# Patient Record
Sex: MALE | Race: BLACK OR AFRICAN AMERICAN | Employment: OTHER | URBAN - METROPOLITAN AREA
[De-identification: names, ages, dates, MRNs, and addresses within clinical notes are randomized per-mention and may not be internally consistent; named-entity substitution may affect disease eponyms.]

---

## 2021-01-01 ENCOUNTER — APPOINTMENT (OUTPATIENT)
Dept: NON INVASIVE DIAGNOSTICS | Age: 66
DRG: 194 | End: 2021-01-01
Attending: SPECIALIST
Payer: MEDICAID

## 2021-01-01 ENCOUNTER — APPOINTMENT (OUTPATIENT)
Dept: NUCLEAR MEDICINE | Age: 66
DRG: 194 | End: 2021-01-01
Attending: SPECIALIST
Payer: MEDICAID

## 2021-01-01 ENCOUNTER — HOSPITAL ENCOUNTER (OUTPATIENT)
Age: 66
Setting detail: OUTPATIENT SURGERY
Discharge: HOME | End: 2021-11-29
Attending: INTERNAL MEDICINE | Admitting: INTERNAL MEDICINE
Payer: MEDICAID

## 2021-01-01 ENCOUNTER — HOSPITAL ENCOUNTER (OUTPATIENT)
Dept: NUCLEAR MEDICINE | Age: 66
Discharge: HOME | End: 2021-10-13
Attending: NURSE PRACTITIONER
Payer: COMMERCIAL

## 2021-01-01 ENCOUNTER — APPOINTMENT (OUTPATIENT)
Dept: GENERAL RADIOLOGY | Age: 66
DRG: 194 | End: 2021-01-01
Attending: EMERGENCY MEDICINE
Payer: MEDICAID

## 2021-01-01 ENCOUNTER — ANESTHESIA EVENT (OUTPATIENT)
Dept: ENDOSCOPY | Age: 66
End: 2021-01-01
Payer: MEDICAID

## 2021-01-01 ENCOUNTER — APPOINTMENT (OUTPATIENT)
Dept: NON INVASIVE DIAGNOSTICS | Age: 66
DRG: 194 | End: 2021-01-01
Attending: NURSE PRACTITIONER
Payer: MEDICAID

## 2021-01-01 ENCOUNTER — TRANSCRIBE ORDER (OUTPATIENT)
Dept: SCHEDULING | Age: 66
End: 2021-01-01

## 2021-01-01 ENCOUNTER — ANESTHESIA (OUTPATIENT)
Dept: ENDOSCOPY | Age: 66
End: 2021-01-01
Payer: MEDICAID

## 2021-01-01 ENCOUNTER — HOSPITAL ENCOUNTER (INPATIENT)
Age: 66
LOS: 5 days | Discharge: NURSING, REHAB OR LONG-TERM CARE | DRG: 194 | End: 2021-11-21
Attending: EMERGENCY MEDICINE | Admitting: FAMILY MEDICINE
Payer: MEDICAID

## 2021-01-01 ENCOUNTER — HOSPITAL ENCOUNTER (EMERGENCY)
Age: 66
Discharge: HOME | End: 2021-12-02
Attending: EMERGENCY MEDICINE
Payer: MEDICAID

## 2021-01-01 VITALS
OXYGEN SATURATION: 98 % | HEIGHT: 72 IN | DIASTOLIC BLOOD PRESSURE: 64 MMHG | SYSTOLIC BLOOD PRESSURE: 111 MMHG | BODY MASS INDEX: 21.77 KG/M2 | RESPIRATION RATE: 16 BRPM | TEMPERATURE: 97.6 F | HEART RATE: 85 BPM

## 2021-01-01 VITALS
SYSTOLIC BLOOD PRESSURE: 105 MMHG | HEIGHT: 75 IN | WEIGHT: 160.5 LBS | HEART RATE: 101 BPM | DIASTOLIC BLOOD PRESSURE: 67 MMHG | BODY MASS INDEX: 19.96 KG/M2 | TEMPERATURE: 98.5 F | RESPIRATION RATE: 18 BRPM | OXYGEN SATURATION: 98 %

## 2021-01-01 VITALS
OXYGEN SATURATION: 99 % | HEIGHT: 75 IN | WEIGHT: 176.37 LBS | BODY MASS INDEX: 21.93 KG/M2 | HEART RATE: 91 BPM | DIASTOLIC BLOOD PRESSURE: 79 MMHG | TEMPERATURE: 98.4 F | RESPIRATION RATE: 25 BRPM | SYSTOLIC BLOOD PRESSURE: 111 MMHG

## 2021-01-01 DIAGNOSIS — J81.0 ACUTE PULMONARY EDEMA (HCC): ICD-10-CM

## 2021-01-01 DIAGNOSIS — E87.6 HYPOKALEMIA: ICD-10-CM

## 2021-01-01 DIAGNOSIS — Z99.2 ANEMIA DUE TO CHRONIC KIDNEY DISEASE, ON CHRONIC DIALYSIS (HCC): ICD-10-CM

## 2021-01-01 DIAGNOSIS — I25.10 CORONARY ARTERY DISEASE INVOLVING NATIVE CORONARY ARTERY OF NATIVE HEART WITHOUT ANGINA PECTORIS: Chronic | ICD-10-CM

## 2021-01-01 DIAGNOSIS — R06.6 INTRACTABLE HICCUPS: Primary | ICD-10-CM

## 2021-01-01 DIAGNOSIS — D63.1 ANEMIA DUE TO CHRONIC KIDNEY DISEASE, ON CHRONIC DIALYSIS (HCC): ICD-10-CM

## 2021-01-01 DIAGNOSIS — N18.6 ESRD (END STAGE RENAL DISEASE) (HCC): ICD-10-CM

## 2021-01-01 DIAGNOSIS — N18.6 ANEMIA DUE TO CHRONIC KIDNEY DISEASE, ON CHRONIC DIALYSIS (HCC): ICD-10-CM

## 2021-01-01 DIAGNOSIS — I49.9 IRREGULAR HEART RATE: Primary | ICD-10-CM

## 2021-01-01 DIAGNOSIS — R06.6 INTRACTABLE HICCUPS: ICD-10-CM

## 2021-01-01 DIAGNOSIS — I50.23 SYSTOLIC CHF, ACUTE ON CHRONIC (HCC): ICD-10-CM

## 2021-01-01 DIAGNOSIS — J96.01 ACUTE HYPOXEMIC RESPIRATORY FAILURE (HCC): Primary | ICD-10-CM

## 2021-01-01 DIAGNOSIS — R73.9 HYPERGLYCEMIA: ICD-10-CM

## 2021-01-01 DIAGNOSIS — R11.14: ICD-10-CM

## 2021-01-01 DIAGNOSIS — I21.4 NSTEMI (NON-ST ELEVATED MYOCARDIAL INFARCTION) (HCC): ICD-10-CM

## 2021-01-01 LAB
ALBUMIN SERPL-MCNC: 2 G/DL (ref 3.5–5)
ALBUMIN SERPL-MCNC: 2.4 G/DL (ref 3.5–5)
ALBUMIN SERPL-MCNC: 2.5 G/DL (ref 3.5–5)
ALBUMIN SERPL-MCNC: 2.7 G/DL (ref 3.5–5)
ALBUMIN/GLOB SERPL: 0.5 {RATIO} (ref 1.1–2.2)
ALBUMIN/GLOB SERPL: 0.5 {RATIO} (ref 1.1–2.2)
ALBUMIN/GLOB SERPL: 0.6 {RATIO} (ref 1.1–2.2)
ALP SERPL-CCNC: 53 U/L (ref 45–117)
ALP SERPL-CCNC: 64 U/L (ref 45–117)
ALP SERPL-CCNC: 66 U/L (ref 45–117)
ALP SERPL-CCNC: 68 U/L (ref 45–117)
ALP SERPL-CCNC: 68 U/L (ref 45–117)
ALP SERPL-CCNC: 69 U/L (ref 45–117)
ALT SERPL-CCNC: 14 U/L (ref 12–78)
ALT SERPL-CCNC: 15 U/L (ref 12–78)
ALT SERPL-CCNC: 16 U/L (ref 12–78)
ALT SERPL-CCNC: 18 U/L (ref 12–78)
ALT SERPL-CCNC: 19 U/L (ref 12–78)
ALT SERPL-CCNC: 20 U/L (ref 12–78)
ANION GAP BLD CALC-SCNC: 12 MMOL/L (ref 10–20)
ANION GAP SERPL CALC-SCNC: 11 MMOL/L (ref 5–15)
ANION GAP SERPL CALC-SCNC: 13 MMOL/L (ref 5–15)
ANION GAP SERPL CALC-SCNC: 14 MMOL/L (ref 5–15)
ANION GAP SERPL CALC-SCNC: 9 MMOL/L (ref 5–15)
ANION GAP SERPL CALC-SCNC: 9 MMOL/L (ref 5–15)
APTT PPP: 27.6 SEC (ref 22.1–31)
APTT PPP: 35.5 SEC (ref 22.1–31)
APTT PPP: 42.7 SEC (ref 22.1–31)
APTT PPP: 48.4 SEC (ref 22.1–31)
APTT PPP: 94 SEC (ref 22.1–31)
ARTERIAL PATENCY WRIST A: NEGATIVE
AST SERPL-CCNC: 12 U/L (ref 15–37)
AST SERPL-CCNC: 14 U/L (ref 15–37)
AST SERPL-CCNC: 14 U/L (ref 15–37)
AST SERPL-CCNC: 15 U/L (ref 15–37)
AST SERPL-CCNC: 16 U/L (ref 15–37)
AST SERPL-CCNC: 21 U/L (ref 15–37)
ATRIAL RATE: 92 BPM
BACTERIA SPEC CULT: NORMAL
BACTERIA SPEC CULT: NORMAL
BASE EXCESS BLD CALC-SCNC: 4.1 MMOL/L
BASOPHILS # BLD: 0 K/UL (ref 0–0.1)
BASOPHILS # BLD: 0 K/UL (ref 0–0.1)
BASOPHILS # BLD: 0.1 K/UL (ref 0–0.1)
BASOPHILS NFR BLD: 0 % (ref 0–1)
BASOPHILS NFR BLD: 0 % (ref 0–1)
BASOPHILS NFR BLD: 1 % (ref 0–1)
BDY SITE: ABNORMAL
BILIRUB SERPL-MCNC: 0.4 MG/DL (ref 0.2–1)
BILIRUB SERPL-MCNC: 0.5 MG/DL (ref 0.2–1)
BILIRUB SERPL-MCNC: 0.7 MG/DL (ref 0.2–1)
BILIRUB SERPL-MCNC: 0.8 MG/DL (ref 0.2–1)
BUN SERPL-MCNC: 11 MG/DL (ref 6–20)
BUN SERPL-MCNC: 35 MG/DL (ref 6–20)
BUN SERPL-MCNC: 42 MG/DL (ref 6–20)
BUN SERPL-MCNC: 54 MG/DL (ref 6–20)
BUN SERPL-MCNC: 56 MG/DL (ref 6–20)
BUN SERPL-MCNC: 59 MG/DL (ref 6–20)
BUN SERPL-MCNC: 60 MG/DL (ref 6–20)
BUN/CREAT SERPL: 4 (ref 12–20)
BUN/CREAT SERPL: 5 (ref 12–20)
BUN/CREAT SERPL: 6 (ref 12–20)
BUN/CREAT SERPL: 7 (ref 12–20)
BUN/CREAT SERPL: 7 (ref 12–20)
CA-I BLD-MCNC: 1.15 MMOL/L (ref 1.12–1.32)
CALCIUM SERPL-MCNC: 7 MG/DL (ref 8.5–10.1)
CALCIUM SERPL-MCNC: 9.4 MG/DL (ref 8.5–10.1)
CALCIUM SERPL-MCNC: 9.5 MG/DL (ref 8.5–10.1)
CALCIUM SERPL-MCNC: 9.6 MG/DL (ref 8.5–10.1)
CALCIUM SERPL-MCNC: 9.7 MG/DL (ref 8.5–10.1)
CALCULATED P AXIS, ECG09: 15 DEGREES
CALCULATED R AXIS, ECG10: -30 DEGREES
CALCULATED T AXIS, ECG11: 142 DEGREES
CHLORIDE BLD-SCNC: 103 MMOL/L (ref 98–107)
CHLORIDE SERPL-SCNC: 100 MMOL/L (ref 97–108)
CHLORIDE SERPL-SCNC: 101 MMOL/L (ref 97–108)
CHLORIDE SERPL-SCNC: 112 MMOL/L (ref 97–108)
CHLORIDE SERPL-SCNC: 96 MMOL/L (ref 97–108)
CHLORIDE SERPL-SCNC: 97 MMOL/L (ref 97–108)
CHLORIDE SERPL-SCNC: 98 MMOL/L (ref 97–108)
CHLORIDE SERPL-SCNC: 99 MMOL/L (ref 97–108)
CO2 BLD-SCNC: 26.9 MMOL/L (ref 21–32)
CO2 SERPL-SCNC: 23 MMOL/L (ref 21–32)
CO2 SERPL-SCNC: 23 MMOL/L (ref 21–32)
CO2 SERPL-SCNC: 24 MMOL/L (ref 21–32)
CO2 SERPL-SCNC: 28 MMOL/L (ref 21–32)
CO2 SERPL-SCNC: 29 MMOL/L (ref 21–32)
COMMENT, HOLDF: NORMAL
COMMENT, HOLDF: NORMAL
COVID-19 RAPID TEST, COVR: NOT DETECTED
COVID-19 RAPID TEST, COVR: NOT DETECTED
CREAT BLD-MCNC: 8.22 MG/DL (ref 0.6–1.3)
CREAT SERPL-MCNC: 3.13 MG/DL (ref 0.7–1.3)
CREAT SERPL-MCNC: 6.38 MG/DL (ref 0.7–1.3)
CREAT SERPL-MCNC: 6.69 MG/DL (ref 0.7–1.3)
CREAT SERPL-MCNC: 8.64 MG/DL (ref 0.7–1.3)
CREAT SERPL-MCNC: 8.75 MG/DL (ref 0.7–1.3)
CREAT SERPL-MCNC: 9.04 MG/DL (ref 0.7–1.3)
CREAT SERPL-MCNC: 9.07 MG/DL (ref 0.7–1.3)
DIAGNOSIS, 93000: NORMAL
DIFFERENTIAL METHOD BLD: ABNORMAL
ECHO AV PEAK GRADIENT: 5.22 MMHG
ECHO AV PEAK VELOCITY: 114.25 CM/S
ECHO EST RA PRESSURE: 3 MMHG
ECHO LA AREA 4C: 28.57 CM2
ECHO LA MAJOR AXIS: 4.1 CM
ECHO LA MINOR AXIS: 1.97 CM
ECHO LA VOL 4C: 94.76 ML (ref 18–58)
ECHO LA VOLUME INDEX A4C: 45.56 ML/M2 (ref 16–28)
ECHO LV E' LATERAL VELOCITY: 5.7 CM/S
ECHO LV E' SEPTAL VELOCITY: 4.52 CM/S
ECHO LV INTERNAL DIMENSION DIASTOLIC: 5.98 CM (ref 4.2–5.9)
ECHO LV INTERNAL DIMENSION SYSTOLIC: 5.1 CM
ECHO LV IVSD: 0.93 CM (ref 0.6–1)
ECHO LV MASS 2D: 279.7 G (ref 88–224)
ECHO LV MASS INDEX 2D: 134.5 G/M2 (ref 49–115)
ECHO LV POSTERIOR WALL DIASTOLIC: 1.28 CM (ref 0.6–1)
ECHO LVOT PEAK GRADIENT: 4.25 MMHG
ECHO LVOT PEAK VELOCITY: 103.1 CM/S
ECHO LVOT VTI: 13.65 CM
ECHO MV A VELOCITY: 49.19 CM/S
ECHO MV E VELOCITY: 95.16 CM/S
ECHO MV E/A RATIO: 1.93
ECHO MV E/E' LATERAL: 16.69
ECHO MV E/E' RATIO (AVERAGED): 18.87
ECHO MV E/E' SEPTAL: 21.05
ECHO MV REGURGITANT VTIA: 156.54 CM
ECHO RIGHT VENTRICULAR SYSTOLIC PRESSURE (RVSP): 63.63 MMHG
ECHO RV INTERNAL DIMENSION: 3.22 CM
ECHO RV TAPSE: 1.41 CM (ref 1.5–2)
ECHO TV REGURGITANT MAX VELOCITY: 389.33 CM/S
ECHO TV REGURGITANT PEAK GRADIENT: 60.63 MMHG
EOSINOPHIL # BLD: 0 K/UL (ref 0–0.4)
EOSINOPHIL # BLD: 0.2 K/UL (ref 0–0.4)
EOSINOPHIL # BLD: 0.2 K/UL (ref 0–0.4)
EOSINOPHIL NFR BLD: 0 % (ref 0–7)
EOSINOPHIL NFR BLD: 2 % (ref 0–7)
EOSINOPHIL NFR BLD: 5 % (ref 0–7)
ERYTHROCYTE [DISTWIDTH] IN BLOOD BY AUTOMATED COUNT: 15.9 % (ref 11.5–14.5)
ERYTHROCYTE [DISTWIDTH] IN BLOOD BY AUTOMATED COUNT: 16 % (ref 11.5–14.5)
ERYTHROCYTE [DISTWIDTH] IN BLOOD BY AUTOMATED COUNT: 16.3 % (ref 11.5–14.5)
ERYTHROCYTE [DISTWIDTH] IN BLOOD BY AUTOMATED COUNT: 16.4 % (ref 11.5–14.5)
EST. AVERAGE GLUCOSE BLD GHB EST-MCNC: 166 MG/DL
GLOBULIN SER CALC-MCNC: 3.6 G/DL (ref 2–4)
GLOBULIN SER CALC-MCNC: 4.2 G/DL (ref 2–4)
GLOBULIN SER CALC-MCNC: 4.4 G/DL (ref 2–4)
GLOBULIN SER CALC-MCNC: 4.5 G/DL (ref 2–4)
GLOBULIN SER CALC-MCNC: 4.6 G/DL (ref 2–4)
GLOBULIN SER CALC-MCNC: 4.8 G/DL (ref 2–4)
GLUCOSE BLD STRIP.AUTO-MCNC: 147 MG/DL (ref 65–117)
GLUCOSE BLD STRIP.AUTO-MCNC: 176 MG/DL (ref 65–117)
GLUCOSE BLD STRIP.AUTO-MCNC: 178 MG/DL (ref 65–117)
GLUCOSE BLD STRIP.AUTO-MCNC: 179 MG/DL (ref 65–117)
GLUCOSE BLD STRIP.AUTO-MCNC: 179 MG/DL (ref 65–117)
GLUCOSE BLD STRIP.AUTO-MCNC: 193 MG/DL (ref 65–117)
GLUCOSE BLD STRIP.AUTO-MCNC: 200 MG/DL (ref 65–117)
GLUCOSE BLD STRIP.AUTO-MCNC: 201 MG/DL (ref 65–117)
GLUCOSE BLD STRIP.AUTO-MCNC: 203 MG/DL (ref 65–117)
GLUCOSE BLD STRIP.AUTO-MCNC: 203 MG/DL (ref 65–117)
GLUCOSE BLD STRIP.AUTO-MCNC: 206 MG/DL (ref 65–117)
GLUCOSE BLD STRIP.AUTO-MCNC: 210 MG/DL (ref 65–117)
GLUCOSE BLD STRIP.AUTO-MCNC: 212 MG/DL (ref 65–117)
GLUCOSE BLD STRIP.AUTO-MCNC: 213 MG/DL (ref 65–117)
GLUCOSE BLD STRIP.AUTO-MCNC: 217 MG/DL (ref 65–117)
GLUCOSE BLD STRIP.AUTO-MCNC: 225 MG/DL (ref 65–117)
GLUCOSE BLD STRIP.AUTO-MCNC: 225 MG/DL (ref 65–117)
GLUCOSE BLD STRIP.AUTO-MCNC: 233 MG/DL (ref 65–117)
GLUCOSE BLD STRIP.AUTO-MCNC: 253 MG/DL (ref 65–117)
GLUCOSE BLD STRIP.AUTO-MCNC: 278 MG/DL (ref 65–117)
GLUCOSE BLD-MCNC: 175 MG/DL (ref 65–100)
GLUCOSE SERPL-MCNC: 115 MG/DL (ref 65–100)
GLUCOSE SERPL-MCNC: 168 MG/DL (ref 65–100)
GLUCOSE SERPL-MCNC: 187 MG/DL (ref 65–100)
GLUCOSE SERPL-MCNC: 208 MG/DL (ref 65–100)
GLUCOSE SERPL-MCNC: 249 MG/DL (ref 65–100)
GLUCOSE SERPL-MCNC: 252 MG/DL (ref 65–100)
GLUCOSE SERPL-MCNC: 273 MG/DL (ref 65–100)
HBA1C MFR BLD: 7.4 % (ref 4–5.6)
HBV SURFACE AB SER QL: REACTIVE
HBV SURFACE AB SER-ACNC: 62.19 MIU/ML
HBV SURFACE AG SER QL: <0.1 INDEX
HBV SURFACE AG SER QL: NEGATIVE
HCO3 BLD-SCNC: 26.8 MMOL/L (ref 22–26)
HCT VFR BLD AUTO: 26.1 % (ref 36.6–50.3)
HCT VFR BLD AUTO: 26.1 % (ref 36.6–50.3)
HCT VFR BLD AUTO: 27.9 % (ref 36.6–50.3)
HCT VFR BLD AUTO: 28 % (ref 36.6–50.3)
HCT VFR BLD AUTO: 28.4 % (ref 36.6–50.3)
HCT VFR BLD AUTO: 29.5 % (ref 36.6–50.3)
HGB BLD-MCNC: 8.3 G/DL (ref 12.1–17)
HGB BLD-MCNC: 8.4 G/DL (ref 12.1–17)
HGB BLD-MCNC: 9 G/DL (ref 12.1–17)
HGB BLD-MCNC: 9.1 G/DL (ref 12.1–17)
HGB BLD-MCNC: 9.2 G/DL (ref 12.1–17)
HGB BLD-MCNC: 9.3 G/DL (ref 12.1–17)
IMM GRANULOCYTES # BLD AUTO: 0 K/UL (ref 0–0.04)
IMM GRANULOCYTES # BLD AUTO: 0 K/UL (ref 0–0.04)
IMM GRANULOCYTES # BLD AUTO: 0.1 K/UL (ref 0–0.04)
IMM GRANULOCYTES NFR BLD AUTO: 0 % (ref 0–0.5)
IMM GRANULOCYTES NFR BLD AUTO: 0 % (ref 0–0.5)
IMM GRANULOCYTES NFR BLD AUTO: 1 % (ref 0–0.5)
INR PPP: 1.4 (ref 0.9–1.1)
LACTATE SERPL-SCNC: 1.6 MMOL/L (ref 0.4–2)
LACTATE SERPL-SCNC: 3.2 MMOL/L (ref 0.4–2)
LYMPHOCYTES # BLD: 0.6 K/UL (ref 0.8–3.5)
LYMPHOCYTES # BLD: 0.7 K/UL (ref 0.8–3.5)
LYMPHOCYTES # BLD: 0.8 K/UL (ref 0.8–3.5)
LYMPHOCYTES NFR BLD: 16 % (ref 12–49)
LYMPHOCYTES NFR BLD: 6 % (ref 12–49)
LYMPHOCYTES NFR BLD: 9 % (ref 12–49)
MAGNESIUM SERPL-MCNC: 2.1 MG/DL (ref 1.6–2.4)
MAGNESIUM SERPL-MCNC: 2.2 MG/DL (ref 1.6–2.4)
MAGNESIUM SERPL-MCNC: 2.3 MG/DL (ref 1.6–2.4)
MAGNESIUM SERPL-MCNC: 2.3 MG/DL (ref 1.6–2.4)
MAGNESIUM SERPL-MCNC: 2.4 MG/DL (ref 1.6–2.4)
MCH RBC QN AUTO: 29.3 PG (ref 26–34)
MCH RBC QN AUTO: 29.6 PG (ref 26–34)
MCH RBC QN AUTO: 29.6 PG (ref 26–34)
MCH RBC QN AUTO: 29.7 PG (ref 26–34)
MCH RBC QN AUTO: 30 PG (ref 26–34)
MCH RBC QN AUTO: 30.5 PG (ref 26–34)
MCHC RBC AUTO-ENTMCNC: 31.5 G/DL (ref 30–36.5)
MCHC RBC AUTO-ENTMCNC: 31.7 G/DL (ref 30–36.5)
MCHC RBC AUTO-ENTMCNC: 31.8 G/DL (ref 30–36.5)
MCHC RBC AUTO-ENTMCNC: 32.2 G/DL (ref 30–36.5)
MCHC RBC AUTO-ENTMCNC: 32.6 G/DL (ref 30–36.5)
MCHC RBC AUTO-ENTMCNC: 32.9 G/DL (ref 30–36.5)
MCV RBC AUTO: 91.2 FL (ref 80–99)
MCV RBC AUTO: 91.9 FL (ref 80–99)
MCV RBC AUTO: 92.2 FL (ref 80–99)
MCV RBC AUTO: 92.7 FL (ref 80–99)
MCV RBC AUTO: 93.4 FL (ref 80–99)
MCV RBC AUTO: 95.2 FL (ref 80–99)
MONOCYTES # BLD: 0.5 K/UL (ref 0–1)
MONOCYTES # BLD: 0.5 K/UL (ref 0–1)
MONOCYTES # BLD: 0.7 K/UL (ref 0–1)
MONOCYTES NFR BLD: 14 % (ref 5–13)
MONOCYTES NFR BLD: 5 % (ref 5–13)
MONOCYTES NFR BLD: 6 % (ref 5–13)
NEUTS SEG # BLD: 3.1 K/UL (ref 1.8–8)
NEUTS SEG # BLD: 6.5 K/UL (ref 1.8–8)
NEUTS SEG # BLD: 8.5 K/UL (ref 1.8–8)
NEUTS SEG NFR BLD: 64 % (ref 32–75)
NEUTS SEG NFR BLD: 82 % (ref 32–75)
NEUTS SEG NFR BLD: 89 % (ref 32–75)
NRBC # BLD: 0 K/UL (ref 0–0.01)
NRBC BLD-RTO: 0 PER 100 WBC
P-R INTERVAL, ECG05: 170 MS
PCO2 BLD: 31.8 MMHG (ref 35–45)
PH BLD: 7.53 [PH] (ref 7.35–7.45)
PHOSPHATE SERPL-MCNC: 3.6 MG/DL (ref 2.6–4.7)
PHOSPHATE SERPL-MCNC: 3.7 MG/DL (ref 2.6–4.7)
PHOSPHATE SERPL-MCNC: 4.3 MG/DL (ref 2.6–4.7)
PLATELET # BLD AUTO: 156 K/UL (ref 150–400)
PLATELET # BLD AUTO: 160 K/UL (ref 150–400)
PLATELET # BLD AUTO: 165 K/UL (ref 150–400)
PLATELET # BLD AUTO: 168 K/UL (ref 150–400)
PLATELET # BLD AUTO: 169 K/UL (ref 150–400)
PLATELET # BLD AUTO: 171 K/UL (ref 150–400)
PMV BLD AUTO: 10.7 FL (ref 8.9–12.9)
PMV BLD AUTO: 10.9 FL (ref 8.9–12.9)
PMV BLD AUTO: 10.9 FL (ref 8.9–12.9)
PMV BLD AUTO: 11 FL (ref 8.9–12.9)
PMV BLD AUTO: 11.1 FL (ref 8.9–12.9)
PMV BLD AUTO: 11.2 FL (ref 8.9–12.9)
PO2 BLD: 57 MMHG (ref 80–100)
POTASSIUM BLD-SCNC: 4.2 MMOL/L (ref 3.5–5.1)
POTASSIUM SERPL-SCNC: 3.1 MMOL/L (ref 3.5–5.1)
POTASSIUM SERPL-SCNC: 3.4 MMOL/L (ref 3.5–5.1)
POTASSIUM SERPL-SCNC: 3.5 MMOL/L (ref 3.5–5.1)
POTASSIUM SERPL-SCNC: 3.6 MMOL/L (ref 3.5–5.1)
POTASSIUM SERPL-SCNC: 3.7 MMOL/L (ref 3.5–5.1)
POTASSIUM SERPL-SCNC: 3.8 MMOL/L (ref 3.5–5.1)
POTASSIUM SERPL-SCNC: 4 MMOL/L (ref 3.5–5.1)
PROT SERPL-MCNC: 5.6 G/DL (ref 6.4–8.2)
PROT SERPL-MCNC: 6.6 G/DL (ref 6.4–8.2)
PROT SERPL-MCNC: 6.9 G/DL (ref 6.4–8.2)
PROT SERPL-MCNC: 7 G/DL (ref 6.4–8.2)
PROT SERPL-MCNC: 7.2 G/DL (ref 6.4–8.2)
PROT SERPL-MCNC: 7.2 G/DL (ref 6.4–8.2)
PROTHROMBIN TIME: 14 SEC (ref 9–11.1)
Q-T INTERVAL, ECG07: 398 MS
QRS DURATION, ECG06: 108 MS
QTC CALCULATION (BEZET), ECG08: 492 MS
RBC # BLD AUTO: 2.83 M/UL (ref 4.1–5.7)
RBC # BLD AUTO: 2.84 M/UL (ref 4.1–5.7)
RBC # BLD AUTO: 3.02 M/UL (ref 4.1–5.7)
RBC # BLD AUTO: 3.04 M/UL (ref 4.1–5.7)
RBC # BLD AUTO: 3.06 M/UL (ref 4.1–5.7)
RBC # BLD AUTO: 3.1 M/UL (ref 4.1–5.7)
RBC MORPH BLD: ABNORMAL
SAMPLES BEING HELD,HOLD: NORMAL
SAMPLES BEING HELD,HOLD: NORMAL
SAO2 % BLD: 92.4 % (ref 92–97)
SERVICE CMNT-IMP: ABNORMAL
SERVICE CMNT-IMP: NORMAL
SERVICE CMNT-IMP: NORMAL
SODIUM BLD-SCNC: 141 MMOL/L (ref 136–145)
SODIUM SERPL-SCNC: 135 MMOL/L (ref 136–145)
SODIUM SERPL-SCNC: 136 MMOL/L (ref 136–145)
SODIUM SERPL-SCNC: 137 MMOL/L (ref 136–145)
SODIUM SERPL-SCNC: 138 MMOL/L (ref 136–145)
SODIUM SERPL-SCNC: 144 MMOL/L (ref 136–145)
SOURCE, COVRS: NORMAL
SOURCE, COVRS: NORMAL
SPECIMEN TYPE: ABNORMAL
STRESS BASELINE DIAS BP: 75 MMHG
STRESS BASELINE HR: 114 BPM
STRESS BASELINE SYS BP: 145 MMHG
STRESS ESTIMATED WORKLOAD: 1 METS
STRESS EXERCISE DUR MIN: NORMAL
STRESS PEAK DIAS BP: 75 MMHG
STRESS PEAK SYS BP: 145 MMHG
STRESS PERCENT HR ACHIEVED: 75 %
STRESS POST PEAK HR: 114 BPM
STRESS RATE PRESSURE PRODUCT: NORMAL BPM*MMHG
STRESS STAGE 1 DURATION: 1 MIN:SEC
STRESS STAGE 1 HR: 98 BPM
STRESS STAGE 2 BP: NORMAL MMHG
STRESS STAGE 2 DURATION: 1 MIN:SEC
STRESS STAGE 2 HR: 102 BPM
STRESS STAGE 3 DURATION: 1 MIN:SEC
STRESS STAGE 3 HR: 106 BPM
STRESS STAGE RECOVERY 1 BP: NORMAL MMHG
STRESS STAGE RECOVERY 1 DURATION: 1 MIN:SEC
STRESS STAGE RECOVERY 1 HR: 100 BPM
STRESS STAGE RECOVERY 2 DURATION: 1 MIN:SEC
STRESS STAGE RECOVERY 2 HR: 103 BPM
STRESS TARGET HR: 153 BPM
THERAPEUTIC RANGE,PTTT: ABNORMAL SECS (ref 58–77)
THERAPEUTIC RANGE,PTTT: NORMAL SECS (ref 58–77)
TROPONIN-HIGH SENSITIVITY: 1474 NG/L (ref 0–76)
TROPONIN-HIGH SENSITIVITY: 1702 NG/L (ref 0–76)
TROPONIN-HIGH SENSITIVITY: 2136 NG/L (ref 0–76)
VENTRICULAR RATE, ECG03: 92 BPM
WBC # BLD AUTO: 4.9 K/UL (ref 4.1–11.1)
WBC # BLD AUTO: 5.9 K/UL (ref 4.1–11.1)
WBC # BLD AUTO: 6.9 K/UL (ref 4.1–11.1)
WBC # BLD AUTO: 7.6 K/UL (ref 4.1–11.1)
WBC # BLD AUTO: 8 K/UL (ref 4.1–11.1)
WBC # BLD AUTO: 9.6 K/UL (ref 4.1–11.1)

## 2021-01-01 PROCEDURE — 85027 COMPLETE CBC AUTOMATED: CPT

## 2021-01-01 PROCEDURE — 84100 ASSAY OF PHOSPHORUS: CPT

## 2021-01-01 PROCEDURE — 80053 COMPREHEN METABOLIC PANEL: CPT

## 2021-01-01 PROCEDURE — 99285 EMERGENCY DEPT VISIT HI MDM: CPT

## 2021-01-01 PROCEDURE — 74011636637 HC RX REV CODE- 636/637: Performed by: NURSE PRACTITIONER

## 2021-01-01 PROCEDURE — 74011250636 HC RX REV CODE- 250/636: Performed by: ANESTHESIOLOGY

## 2021-01-01 PROCEDURE — 83036 HEMOGLOBIN GLYCOSYLATED A1C: CPT

## 2021-01-01 PROCEDURE — 85610 PROTHROMBIN TIME: CPT

## 2021-01-01 PROCEDURE — 77030019988 HC FCPS ENDOSC DISP BSC -B: Performed by: INTERNAL MEDICINE

## 2021-01-01 PROCEDURE — 74011000250 HC RX REV CODE- 250: Performed by: INTERNAL MEDICINE

## 2021-01-01 PROCEDURE — 82962 GLUCOSE BLOOD TEST: CPT

## 2021-01-01 PROCEDURE — 93306 TTE W/DOPPLER COMPLETE: CPT | Performed by: SPECIALIST

## 2021-01-01 PROCEDURE — 74011250636 HC RX REV CODE- 250/636: Performed by: INTERNAL MEDICINE

## 2021-01-01 PROCEDURE — 65660000001 HC RM ICU INTERMED STEPDOWN

## 2021-01-01 PROCEDURE — 85025 COMPLETE CBC W/AUTO DIFF WBC: CPT

## 2021-01-01 PROCEDURE — 86706 HEP B SURFACE ANTIBODY: CPT

## 2021-01-01 PROCEDURE — 36415 COLL VENOUS BLD VENIPUNCTURE: CPT

## 2021-01-01 PROCEDURE — 74011000250 HC RX REV CODE- 250: Performed by: FAMILY MEDICINE

## 2021-01-01 PROCEDURE — 74011250636 HC RX REV CODE- 250/636: Performed by: EMERGENCY MEDICINE

## 2021-01-01 PROCEDURE — 88312 SPECIAL STAINS GROUP 1: CPT

## 2021-01-01 PROCEDURE — 74011000250 HC RX REV CODE- 250: Performed by: ANESTHESIOLOGY

## 2021-01-01 PROCEDURE — 84484 ASSAY OF TROPONIN QUANT: CPT

## 2021-01-01 PROCEDURE — 36600 WITHDRAWAL OF ARTERIAL BLOOD: CPT

## 2021-01-01 PROCEDURE — 74011250637 HC RX REV CODE- 250/637: Performed by: NURSE PRACTITIONER

## 2021-01-01 PROCEDURE — 83605 ASSAY OF LACTIC ACID: CPT

## 2021-01-01 PROCEDURE — 85730 THROMBOPLASTIN TIME PARTIAL: CPT

## 2021-01-01 PROCEDURE — 2709999900 HC NON-CHARGEABLE SUPPLY: Performed by: INTERNAL MEDICINE

## 2021-01-01 PROCEDURE — 74011250637 HC RX REV CODE- 250/637: Performed by: SPECIALIST

## 2021-01-01 PROCEDURE — A9500 TC99M SESTAMIBI: HCPCS

## 2021-01-01 PROCEDURE — 87635 SARS-COV-2 COVID-19 AMP PRB: CPT

## 2021-01-01 PROCEDURE — 99233 SBSQ HOSP IP/OBS HIGH 50: CPT | Performed by: SPECIALIST

## 2021-01-01 PROCEDURE — 74011000250 HC RX REV CODE- 250: Performed by: EMERGENCY MEDICINE

## 2021-01-01 PROCEDURE — 71045 X-RAY EXAM CHEST 1 VIEW: CPT

## 2021-01-01 PROCEDURE — 99223 1ST HOSP IP/OBS HIGH 75: CPT | Performed by: SPECIALIST

## 2021-01-01 PROCEDURE — 74011250636 HC RX REV CODE- 250/636: Performed by: FAMILY MEDICINE

## 2021-01-01 PROCEDURE — 83735 ASSAY OF MAGNESIUM: CPT

## 2021-01-01 PROCEDURE — 80047 BASIC METABLC PNL IONIZED CA: CPT

## 2021-01-01 PROCEDURE — 94760 N-INVAS EAR/PLS OXIMETRY 1: CPT

## 2021-01-01 PROCEDURE — 80048 BASIC METABOLIC PNL TOTAL CA: CPT

## 2021-01-01 PROCEDURE — 88112 CYTOPATH CELL ENHANCE TECH: CPT

## 2021-01-01 PROCEDURE — 76060000031 HC ANESTHESIA FIRST 0.5 HR: Performed by: INTERNAL MEDICINE

## 2021-01-01 PROCEDURE — 76040000019: Performed by: INTERNAL MEDICINE

## 2021-01-01 PROCEDURE — 74011250636 HC RX REV CODE- 250/636: Performed by: NURSE PRACTITIONER

## 2021-01-01 PROCEDURE — C8929 TTE W OR WO FOL WCON,DOPPLER: HCPCS

## 2021-01-01 PROCEDURE — 78452 HT MUSCLE IMAGE SPECT MULT: CPT

## 2021-01-01 PROCEDURE — 96365 THER/PROPH/DIAG IV INF INIT: CPT

## 2021-01-01 PROCEDURE — 74011250636 HC RX REV CODE- 250/636: Performed by: SPECIALIST

## 2021-01-01 PROCEDURE — 87040 BLOOD CULTURE FOR BACTERIA: CPT

## 2021-01-01 PROCEDURE — 90935 HEMODIALYSIS ONE EVALUATION: CPT

## 2021-01-01 PROCEDURE — 77010033678 HC OXYGEN DAILY

## 2021-01-01 PROCEDURE — 5A1D70Z PERFORMANCE OF URINARY FILTRATION, INTERMITTENT, LESS THAN 6 HOURS PER DAY: ICD-10-PCS | Performed by: FAMILY MEDICINE

## 2021-01-01 PROCEDURE — 74011636637 HC RX REV CODE- 636/637: Performed by: FAMILY MEDICINE

## 2021-01-01 PROCEDURE — 78264 GASTRIC EMPTYING IMG STUDY: CPT

## 2021-01-01 PROCEDURE — 87340 HEPATITIS B SURFACE AG IA: CPT

## 2021-01-01 PROCEDURE — 88305 TISSUE EXAM BY PATHOLOGIST: CPT

## 2021-01-01 PROCEDURE — 74011000250 HC RX REV CODE- 250: Performed by: NURSE PRACTITIONER

## 2021-01-01 PROCEDURE — 93005 ELECTROCARDIOGRAM TRACING: CPT

## 2021-01-01 PROCEDURE — 82803 BLOOD GASES ANY COMBINATION: CPT

## 2021-01-01 RX ORDER — CARVEDILOL 6.25 MG/1
6.25 TABLET ORAL 2 TIMES DAILY WITH MEALS
Qty: 60 TABLET | Refills: 3 | Status: SHIPPED | OUTPATIENT
Start: 2021-01-01

## 2021-01-01 RX ORDER — BRIMONIDINE TARTRATE, TIMOLOL MALEATE 2; 5 MG/ML; MG/ML
1 SOLUTION/ DROPS OPHTHALMIC EVERY 12 HOURS
COMMUNITY
Start: 2021-01-01

## 2021-01-01 RX ORDER — MIDAZOLAM HYDROCHLORIDE 1 MG/ML
.25-5 INJECTION, SOLUTION INTRAMUSCULAR; INTRAVENOUS
Status: DISCONTINUED | OUTPATIENT
Start: 2021-01-01 | End: 2021-01-01 | Stop reason: HOSPADM

## 2021-01-01 RX ORDER — INSULIN GLARGINE 100 [IU]/ML
INJECTION, SOLUTION SUBCUTANEOUS
COMMUNITY
Start: 2021-01-01 | End: 2021-01-01

## 2021-01-01 RX ORDER — ALBUTEROL SULFATE 90 UG/1
2 AEROSOL, METERED RESPIRATORY (INHALATION)
Qty: 18 G | Refills: 4 | Status: SHIPPED | OUTPATIENT
Start: 2021-01-01 | End: 2021-01-01

## 2021-01-01 RX ORDER — SODIUM CHLORIDE 0.9 % (FLUSH) 0.9 %
5-40 SYRINGE (ML) INJECTION AS NEEDED
Status: DISCONTINUED | OUTPATIENT
Start: 2021-01-01 | End: 2021-01-01 | Stop reason: HOSPADM

## 2021-01-01 RX ORDER — ACETAMINOPHEN 325 MG/1
650 TABLET ORAL
COMMUNITY

## 2021-01-01 RX ORDER — SODIUM CHLORIDE 0.9 % (FLUSH) 0.9 %
5-40 SYRINGE (ML) INJECTION EVERY 8 HOURS
Status: DISCONTINUED | OUTPATIENT
Start: 2021-01-01 | End: 2021-01-01 | Stop reason: HOSPADM

## 2021-01-01 RX ORDER — ALBUTEROL SULFATE 90 UG/1
2 AEROSOL, METERED RESPIRATORY (INHALATION)
Status: ON HOLD | COMMUNITY
Start: 2021-01-01 | End: 2021-01-01

## 2021-01-01 RX ORDER — ALBUTEROL SULFATE 90 UG/1
AEROSOL, METERED RESPIRATORY (INHALATION)
COMMUNITY
Start: 2021-01-01 | End: 2021-01-01

## 2021-01-01 RX ORDER — HYDRALAZINE HYDROCHLORIDE 10 MG/1
10 TABLET, FILM COATED ORAL 3 TIMES DAILY
Status: DISCONTINUED | OUTPATIENT
Start: 2021-01-01 | End: 2021-01-01

## 2021-01-01 RX ORDER — HYDRALAZINE HYDROCHLORIDE 25 MG/1
25 TABLET, FILM COATED ORAL 3 TIMES DAILY
Qty: 90 TABLET | Refills: 3 | Status: SHIPPED | OUTPATIENT
Start: 2021-01-01 | End: 2022-01-01 | Stop reason: DRUGHIGH

## 2021-01-01 RX ORDER — PROPOFOL 10 MG/ML
INJECTION, EMULSION INTRAVENOUS AS NEEDED
Status: DISCONTINUED | OUTPATIENT
Start: 2021-01-01 | End: 2021-01-01 | Stop reason: HOSPADM

## 2021-01-01 RX ORDER — AMLODIPINE BESYLATE 5 MG/1
10 TABLET ORAL DAILY
Status: DISCONTINUED | OUTPATIENT
Start: 2021-01-01 | End: 2021-01-01

## 2021-01-01 RX ORDER — DEXTROSE 15 G/37.5G
GEL ORAL
COMMUNITY
Start: 2021-01-01

## 2021-01-01 RX ORDER — GLUCAGON 1 MG
VIAL (EA) INJECTION
COMMUNITY
End: 2022-01-01

## 2021-01-01 RX ORDER — DOXEPIN HYDROCHLORIDE 3 MG/1
3 TABLET ORAL
COMMUNITY

## 2021-01-01 RX ORDER — HEPARIN SODIUM 1000 [USP'U]/ML
4000 INJECTION, SOLUTION INTRAVENOUS; SUBCUTANEOUS ONCE
Status: DISCONTINUED | OUTPATIENT
Start: 2021-01-01 | End: 2021-01-01

## 2021-01-01 RX ORDER — DEXTROSE 50 % IN WATER (D50W) INTRAVENOUS SYRINGE
25-50 AS NEEDED
Status: DISCONTINUED | OUTPATIENT
Start: 2021-01-01 | End: 2021-01-01 | Stop reason: HOSPADM

## 2021-01-01 RX ORDER — MAGNESIUM SULFATE 100 %
4 CRYSTALS MISCELLANEOUS AS NEEDED
Status: DISCONTINUED | OUTPATIENT
Start: 2021-01-01 | End: 2021-01-01 | Stop reason: HOSPADM

## 2021-01-01 RX ORDER — BRIMONIDINE TARTRATE 2 MG/ML
SOLUTION/ DROPS OPHTHALMIC
COMMUNITY
Start: 2021-01-01 | End: 2021-01-01

## 2021-01-01 RX ORDER — EPINEPHRINE 0.1 MG/ML
1 INJECTION INTRACARDIAC; INTRAVENOUS
Status: DISCONTINUED | OUTPATIENT
Start: 2021-01-01 | End: 2021-01-01 | Stop reason: HOSPADM

## 2021-01-01 RX ORDER — METOPROLOL TARTRATE 25 MG/1
25 TABLET, FILM COATED ORAL 2 TIMES DAILY
Status: DISCONTINUED | OUTPATIENT
Start: 2021-01-01 | End: 2021-01-01

## 2021-01-01 RX ORDER — SODIUM CHLORIDE 0.9 % (FLUSH) 0.9 %
5-10 SYRINGE (ML) INJECTION AS NEEDED
Status: DISCONTINUED | OUTPATIENT
Start: 2021-01-01 | End: 2021-01-01 | Stop reason: HOSPADM

## 2021-01-01 RX ORDER — ACETAMINOPHEN 325 MG/1
650 TABLET ORAL
Status: DISCONTINUED | OUTPATIENT
Start: 2021-01-01 | End: 2021-01-01 | Stop reason: HOSPADM

## 2021-01-01 RX ORDER — TAMSULOSIN HYDROCHLORIDE 0.4 MG/1
0.4 CAPSULE ORAL
COMMUNITY
Start: 2021-01-01

## 2021-01-01 RX ORDER — ASCORBIC ACID 500 MG
500 TABLET ORAL 2 TIMES DAILY
COMMUNITY

## 2021-01-01 RX ORDER — GABAPENTIN 100 MG/1
100 CAPSULE ORAL
COMMUNITY
End: 2021-01-01

## 2021-01-01 RX ORDER — HEPARIN SODIUM 10000 [USP'U]/100ML
12-25 INJECTION, SOLUTION INTRAVENOUS
Status: DISCONTINUED | OUTPATIENT
Start: 2021-01-01 | End: 2021-01-01 | Stop reason: ALTCHOICE

## 2021-01-01 RX ORDER — HEPARIN SODIUM 5000 [USP'U]/ML
5000 INJECTION, SOLUTION INTRAVENOUS; SUBCUTANEOUS EVERY 8 HOURS
Status: DISCONTINUED | OUTPATIENT
Start: 2021-01-01 | End: 2021-01-01

## 2021-01-01 RX ORDER — BISMUTH SUBSALICYLATE 262 MG
1 TABLET,CHEWABLE ORAL DAILY
COMMUNITY

## 2021-01-01 RX ORDER — FLUMAZENIL 0.1 MG/ML
0.2 INJECTION INTRAVENOUS
Status: DISCONTINUED | OUTPATIENT
Start: 2021-01-01 | End: 2021-01-01 | Stop reason: HOSPADM

## 2021-01-01 RX ORDER — ZINC SULFATE 50(220)MG
1 CAPSULE ORAL DAILY
COMMUNITY

## 2021-01-01 RX ORDER — ERGOCALCIFEROL 1.25 MG/1
50000 CAPSULE ORAL
COMMUNITY

## 2021-01-01 RX ORDER — SODIUM CHLORIDE 9 MG/ML
75 INJECTION, SOLUTION INTRAVENOUS CONTINUOUS
Status: DISCONTINUED | OUTPATIENT
Start: 2021-01-01 | End: 2021-01-01 | Stop reason: HOSPADM

## 2021-01-01 RX ORDER — CHLORPROMAZINE HYDROCHLORIDE 50 MG/1
50 TABLET, FILM COATED ORAL 3 TIMES DAILY
COMMUNITY
Start: 2021-01-01

## 2021-01-01 RX ORDER — AMOXICILLIN AND CLAVULANATE POTASSIUM 875; 125 MG/1; MG/1
1 TABLET, FILM COATED ORAL DAILY
Qty: 7 TABLET | Refills: 0 | Status: SHIPPED | OUTPATIENT
Start: 2021-01-01 | End: 2022-01-01

## 2021-01-01 RX ORDER — TETRAKIS(2-METHOXYISOBUTYLISOCYANIDE)COPPER(I) TETRAFLUOROBORATE 1 MG/ML
10 INJECTION, POWDER, LYOPHILIZED, FOR SOLUTION INTRAVENOUS
Status: COMPLETED | OUTPATIENT
Start: 2021-01-01 | End: 2021-01-01

## 2021-01-01 RX ORDER — NALOXONE HYDROCHLORIDE 0.4 MG/ML
0.4 INJECTION, SOLUTION INTRAMUSCULAR; INTRAVENOUS; SUBCUTANEOUS
Status: DISCONTINUED | OUTPATIENT
Start: 2021-01-01 | End: 2021-01-01 | Stop reason: HOSPADM

## 2021-01-01 RX ORDER — HEPARIN SODIUM 1000 [USP'U]/ML
2000 INJECTION, SOLUTION INTRAVENOUS; SUBCUTANEOUS ONCE
Status: COMPLETED | OUTPATIENT
Start: 2021-01-01 | End: 2021-01-01

## 2021-01-01 RX ORDER — FENTANYL CITRATE 50 UG/ML
25 INJECTION, SOLUTION INTRAMUSCULAR; INTRAVENOUS
Status: DISCONTINUED | OUTPATIENT
Start: 2021-01-01 | End: 2021-01-01 | Stop reason: HOSPADM

## 2021-01-01 RX ORDER — ATORVASTATIN CALCIUM 40 MG/1
40 TABLET, FILM COATED ORAL
COMMUNITY
Start: 2021-01-01

## 2021-01-01 RX ORDER — ONDANSETRON HYDROCHLORIDE 8 MG/1
TABLET, FILM COATED ORAL
COMMUNITY
Start: 2021-01-01 | End: 2021-01-01

## 2021-01-01 RX ORDER — GABAPENTIN 100 MG/1
100 CAPSULE ORAL
COMMUNITY
Start: 2021-01-01

## 2021-01-01 RX ORDER — SERTRALINE HYDROCHLORIDE 50 MG/1
50 TABLET, FILM COATED ORAL DAILY
COMMUNITY
Start: 2021-01-01

## 2021-01-01 RX ORDER — TECHNETIUM TC 99M SULFUR COLLOID 2 MG
0.5 KIT MISCELLANEOUS
Status: COMPLETED | OUTPATIENT
Start: 2021-01-01 | End: 2021-01-01

## 2021-01-01 RX ORDER — OMEPRAZOLE 40 MG/1
40 CAPSULE, DELAYED RELEASE ORAL DAILY
COMMUNITY
Start: 2021-01-01

## 2021-01-01 RX ORDER — ISOSORBIDE DINITRATE 10 MG/1
10 TABLET ORAL 3 TIMES DAILY
Status: DISCONTINUED | OUTPATIENT
Start: 2021-01-01 | End: 2021-01-01 | Stop reason: HOSPADM

## 2021-01-01 RX ORDER — INSULIN LISPRO 100 [IU]/ML
INJECTION, SOLUTION INTRAVENOUS; SUBCUTANEOUS
Status: DISCONTINUED | OUTPATIENT
Start: 2021-01-01 | End: 2021-01-01 | Stop reason: HOSPADM

## 2021-01-01 RX ORDER — SENNOSIDES 8.6 MG/1
1 TABLET ORAL
COMMUNITY

## 2021-01-01 RX ORDER — GUAIFENESIN 100 MG/5ML
81 LIQUID (ML) ORAL DAILY
Status: DISCONTINUED | OUTPATIENT
Start: 2021-01-01 | End: 2021-01-01 | Stop reason: HOSPADM

## 2021-01-01 RX ORDER — ISOSORBIDE DINITRATE 10 MG/1
10 TABLET ORAL 3 TIMES DAILY
Qty: 90 TABLET | Refills: 3 | Status: SHIPPED | OUTPATIENT
Start: 2021-01-01

## 2021-01-01 RX ORDER — LISINOPRIL 40 MG/1
TABLET ORAL
COMMUNITY
Start: 2021-01-01 | End: 2021-01-01

## 2021-01-01 RX ORDER — AZITHROMYCIN 250 MG/1
250 TABLET, FILM COATED ORAL
COMMUNITY
Start: 2021-01-01 | End: 2022-01-01

## 2021-01-01 RX ORDER — TETRAKIS(2-METHOXYISOBUTYLISOCYANIDE)COPPER(I) TETRAFLUOROBORATE 1 MG/ML
10.5 INJECTION, POWDER, LYOPHILIZED, FOR SOLUTION INTRAVENOUS
Status: COMPLETED | OUTPATIENT
Start: 2021-01-01 | End: 2021-01-01

## 2021-01-01 RX ORDER — LATANOPROST 50 UG/ML
1 SOLUTION/ DROPS OPHTHALMIC
COMMUNITY
Start: 2021-01-01

## 2021-01-01 RX ORDER — HEPARIN SODIUM 10000 [USP'U]/100ML
12-25 INJECTION, SOLUTION INTRAVENOUS
Status: DISPENSED | OUTPATIENT
Start: 2021-01-01 | End: 2021-01-01

## 2021-01-01 RX ORDER — INSULIN LISPRO 100 [IU]/ML
INJECTION, SOLUTION INTRAVENOUS; SUBCUTANEOUS EVERY 6 HOURS
Status: DISCONTINUED | OUTPATIENT
Start: 2021-01-01 | End: 2021-01-01

## 2021-01-01 RX ORDER — ATORVASTATIN CALCIUM 40 MG/1
40 TABLET, FILM COATED ORAL
Status: DISCONTINUED | OUTPATIENT
Start: 2021-01-01 | End: 2021-01-01 | Stop reason: HOSPADM

## 2021-01-01 RX ORDER — INSULIN LISPRO 100 [IU]/ML
INJECTION, SOLUTION INTRAVENOUS; SUBCUTANEOUS
COMMUNITY
Start: 2021-01-01

## 2021-01-01 RX ORDER — DEXTROMETHORPHAN/PSEUDOEPHED 2.5-7.5/.8
1.2 DROPS ORAL
Status: DISCONTINUED | OUTPATIENT
Start: 2021-01-01 | End: 2021-01-01 | Stop reason: HOSPADM

## 2021-01-01 RX ORDER — ISOSORBIDE DINITRATE 10 MG/1
5 TABLET ORAL 3 TIMES DAILY
Status: DISCONTINUED | OUTPATIENT
Start: 2021-01-01 | End: 2021-01-01

## 2021-01-01 RX ORDER — HEPARIN SODIUM 5000 [USP'U]/ML
5000 INJECTION, SOLUTION INTRAVENOUS; SUBCUTANEOUS EVERY 12 HOURS
Status: DISCONTINUED | OUTPATIENT
Start: 2021-01-01 | End: 2021-01-01

## 2021-01-01 RX ORDER — CARVEDILOL 25 MG/1
TABLET ORAL
COMMUNITY
Start: 2021-01-01 | End: 2021-01-01

## 2021-01-01 RX ORDER — CARVEDILOL 6.25 MG/1
6.25 TABLET ORAL 2 TIMES DAILY WITH MEALS
Status: DISCONTINUED | OUTPATIENT
Start: 2021-01-01 | End: 2021-01-01 | Stop reason: HOSPADM

## 2021-01-01 RX ORDER — IBUPROFEN 200 MG
CAPSULE ORAL SEE ADMIN INSTRUCTIONS
COMMUNITY
End: 2022-01-01

## 2021-01-01 RX ORDER — LEVOFLOXACIN 5 MG/ML
750 INJECTION, SOLUTION INTRAVENOUS ONCE
Status: COMPLETED | OUTPATIENT
Start: 2021-01-01 | End: 2021-01-01

## 2021-01-01 RX ORDER — HYDRALAZINE HYDROCHLORIDE 25 MG/1
25 TABLET, FILM COATED ORAL 3 TIMES DAILY
Status: DISCONTINUED | OUTPATIENT
Start: 2021-01-01 | End: 2021-01-01 | Stop reason: HOSPADM

## 2021-01-01 RX ORDER — FLUCONAZOLE 100 MG/1
100 TABLET ORAL
COMMUNITY
Start: 2021-01-01 | End: 2022-01-01

## 2021-01-01 RX ORDER — VANCOMYCIN 2 GRAM/500 ML IN 0.9 % SODIUM CHLORIDE INTRAVENOUS
2000 ONCE
Status: COMPLETED | OUTPATIENT
Start: 2021-01-01 | End: 2021-01-01

## 2021-01-01 RX ORDER — SODIUM CHLORIDE 0.9 % (FLUSH) 0.9 %
20 SYRINGE (ML) INJECTION
Status: COMPLETED | OUTPATIENT
Start: 2021-01-01 | End: 2021-01-01

## 2021-01-01 RX ORDER — LIDOCAINE HYDROCHLORIDE 20 MG/ML
INJECTION, SOLUTION EPIDURAL; INFILTRATION; INTRACAUDAL; PERINEURAL AS NEEDED
Status: DISCONTINUED | OUTPATIENT
Start: 2021-01-01 | End: 2021-01-01 | Stop reason: HOSPADM

## 2021-01-01 RX ORDER — SEVELAMER CARBONATE FOR ORAL SUSPENSION 800 MG/1
0.8 POWDER, FOR SUSPENSION ORAL
COMMUNITY
Start: 2021-01-01

## 2021-01-01 RX ORDER — GUAIFENESIN 100 MG/5ML
81 LIQUID (ML) ORAL DAILY
Status: DISCONTINUED | OUTPATIENT
Start: 2021-01-01 | End: 2021-01-01

## 2021-01-01 RX ORDER — CLOPIDOGREL BISULFATE 75 MG/1
75 TABLET ORAL DAILY
COMMUNITY
Start: 2021-01-01

## 2021-01-01 RX ORDER — ATROPINE SULFATE 0.1 MG/ML
0.5 INJECTION INTRAVENOUS
Status: DISCONTINUED | OUTPATIENT
Start: 2021-01-01 | End: 2021-01-01 | Stop reason: HOSPADM

## 2021-01-01 RX ORDER — PREDNISONE 50 MG/1
50 TABLET ORAL DAILY
COMMUNITY
Start: 2021-01-01 | End: 2021-01-01

## 2021-01-01 RX ADMIN — CARVEDILOL 6.25 MG: 6.25 TABLET, FILM COATED ORAL at 10:52

## 2021-01-01 RX ADMIN — PROPOFOL 70 MG: 10 INJECTION, EMULSION INTRAVENOUS at 10:25

## 2021-01-01 RX ADMIN — EPOETIN ALFA-EPBX 10000 UNITS: 10000 INJECTION, SOLUTION INTRAVENOUS; SUBCUTANEOUS at 23:09

## 2021-01-01 RX ADMIN — VANCOMYCIN HYDROCHLORIDE 2000 MG: 10 INJECTION, POWDER, LYOPHILIZED, FOR SOLUTION INTRAVENOUS at 11:24

## 2021-01-01 RX ADMIN — ASPIRIN 81 MG CHEWABLE TABLET 81 MG: 81 TABLET CHEWABLE at 12:58

## 2021-01-01 RX ADMIN — ISOSORBIDE DINITRATE 10 MG: 10 TABLET ORAL at 10:07

## 2021-01-01 RX ADMIN — CEFEPIME HYDROCHLORIDE 1 G: 1 INJECTION, POWDER, FOR SOLUTION INTRAMUSCULAR; INTRAVENOUS at 17:32

## 2021-01-01 RX ADMIN — INSULIN LISPRO 3 UNITS: 100 INJECTION, SOLUTION INTRAVENOUS; SUBCUTANEOUS at 17:21

## 2021-01-01 RX ADMIN — ISOSORBIDE DINITRATE 10 MG: 10 TABLET ORAL at 22:18

## 2021-01-01 RX ADMIN — SODIUM CHLORIDE 1 G: 9 INJECTION INTRAMUSCULAR; INTRAVENOUS; SUBCUTANEOUS at 17:22

## 2021-01-01 RX ADMIN — PROCHLORPERAZINE EDISYLATE 10 MG: 5 INJECTION INTRAMUSCULAR; INTRAVENOUS at 02:07

## 2021-01-01 RX ADMIN — BENZOCAINE 1 SPRAY: 200 SPRAY DENTAL; ORAL; PERIODONTAL at 10:11

## 2021-01-01 RX ADMIN — HYDRALAZINE HYDROCHLORIDE 25 MG: 25 TABLET, FILM COATED ORAL at 08:57

## 2021-01-01 RX ADMIN — INSULIN LISPRO 1 UNITS: 100 INJECTION, SOLUTION INTRAVENOUS; SUBCUTANEOUS at 21:52

## 2021-01-01 RX ADMIN — TETRAKIS(2-METHOXYISOBUTYLISOCYANIDE)COPPER(I) TETRAFLUOROBORATE 30.3 MILLICURIE: 1 INJECTION, POWDER, LYOPHILIZED, FOR SOLUTION INTRAVENOUS at 14:47

## 2021-01-01 RX ADMIN — Medication 10 ML: at 06:43

## 2021-01-01 RX ADMIN — HYDRALAZINE HYDROCHLORIDE 25 MG: 25 TABLET, FILM COATED ORAL at 21:52

## 2021-01-01 RX ADMIN — INSULIN LISPRO 2 UNITS: 100 INJECTION, SOLUTION INTRAVENOUS; SUBCUTANEOUS at 14:02

## 2021-01-01 RX ADMIN — HEPARIN SODIUM 2000 UNITS: 1000 INJECTION INTRAVENOUS; SUBCUTANEOUS at 07:26

## 2021-01-01 RX ADMIN — INSULIN LISPRO 1 UNITS: 100 INJECTION, SOLUTION INTRAVENOUS; SUBCUTANEOUS at 23:05

## 2021-01-01 RX ADMIN — SODIUM CHLORIDE 75 ML/HR: 9 INJECTION, SOLUTION INTRAVENOUS at 09:20

## 2021-01-01 RX ADMIN — ISOSORBIDE DINITRATE 10 MG: 10 TABLET ORAL at 08:57

## 2021-01-01 RX ADMIN — HYDRALAZINE HYDROCHLORIDE 25 MG: 25 TABLET, FILM COATED ORAL at 16:18

## 2021-01-01 RX ADMIN — EPOETIN ALFA-EPBX 10000 UNITS: 10000 INJECTION, SOLUTION INTRAVENOUS; SUBCUTANEOUS at 22:22

## 2021-01-01 RX ADMIN — Medication 10 ML: at 22:19

## 2021-01-01 RX ADMIN — Medication 10 ML: at 21:53

## 2021-01-01 RX ADMIN — Medication 10 ML: at 06:21

## 2021-01-01 RX ADMIN — INSULIN LISPRO 1 UNITS: 100 INJECTION, SOLUTION INTRAVENOUS; SUBCUTANEOUS at 22:18

## 2021-01-01 RX ADMIN — Medication 10 ML: at 22:59

## 2021-01-01 RX ADMIN — INSULIN LISPRO 2 UNITS: 100 INJECTION, SOLUTION INTRAVENOUS; SUBCUTANEOUS at 06:21

## 2021-01-01 RX ADMIN — HEPARIN SODIUM 13 UNITS/KG/HR: 10000 INJECTION, SOLUTION INTRAVENOUS at 07:30

## 2021-01-01 RX ADMIN — CEFEPIME HYDROCHLORIDE 1 G: 1 INJECTION, POWDER, FOR SOLUTION INTRAMUSCULAR; INTRAVENOUS at 17:56

## 2021-01-01 RX ADMIN — ISOSORBIDE DINITRATE 10 MG: 10 TABLET ORAL at 16:18

## 2021-01-01 RX ADMIN — LEVOFLOXACIN 750 MG: 5 INJECTION, SOLUTION INTRAVENOUS at 09:22

## 2021-01-01 RX ADMIN — CEFEPIME 2 G: 2 INJECTION, POWDER, FOR SOLUTION INTRAVENOUS at 09:20

## 2021-01-01 RX ADMIN — ASPIRIN 81 MG CHEWABLE TABLET 81 MG: 81 TABLET CHEWABLE at 08:57

## 2021-01-01 RX ADMIN — INSULIN LISPRO 2 UNITS: 100 INJECTION, SOLUTION INTRAVENOUS; SUBCUTANEOUS at 12:15

## 2021-01-01 RX ADMIN — ATORVASTATIN CALCIUM 40 MG: 40 TABLET, FILM COATED ORAL at 23:05

## 2021-01-01 RX ADMIN — TETRAKIS(2-METHOXYISOBUTYLISOCYANIDE)COPPER(I) TETRAFLUOROBORATE 10.5 MILLICURIE: 1 INJECTION, POWDER, LYOPHILIZED, FOR SOLUTION INTRAVENOUS at 11:50

## 2021-01-01 RX ADMIN — Medication 10 ML: at 17:21

## 2021-01-01 RX ADMIN — CARVEDILOL 6.25 MG: 6.25 TABLET, FILM COATED ORAL at 16:18

## 2021-01-01 RX ADMIN — HYDRALAZINE HYDROCHLORIDE 25 MG: 25 TABLET, FILM COATED ORAL at 17:23

## 2021-01-01 RX ADMIN — CARVEDILOL 6.25 MG: 6.25 TABLET, FILM COATED ORAL at 10:07

## 2021-01-01 RX ADMIN — ISOSORBIDE DINITRATE 10 MG: 10 TABLET ORAL at 17:22

## 2021-01-01 RX ADMIN — Medication 10 ML: at 07:40

## 2021-01-01 RX ADMIN — INSULIN LISPRO 1 UNITS: 100 INJECTION, SOLUTION INTRAVENOUS; SUBCUTANEOUS at 22:57

## 2021-01-01 RX ADMIN — REGADENOSON 0.4 MG: 0.08 INJECTION, SOLUTION INTRAVENOUS at 14:49

## 2021-01-01 RX ADMIN — CARVEDILOL 6.25 MG: 6.25 TABLET, FILM COATED ORAL at 08:57

## 2021-01-01 RX ADMIN — INSULIN LISPRO 2 UNITS: 100 INJECTION, SOLUTION INTRAVENOUS; SUBCUTANEOUS at 18:24

## 2021-01-01 RX ADMIN — HYDRALAZINE HYDROCHLORIDE 25 MG: 25 TABLET, FILM COATED ORAL at 22:18

## 2021-01-01 RX ADMIN — TECHNETIUM TC 99M SULFUR COLLOID 0.5 MILLICURIE: KIT at 09:40

## 2021-01-01 RX ADMIN — INSULIN LISPRO 2 UNITS: 100 INJECTION, SOLUTION INTRAVENOUS; SUBCUTANEOUS at 18:00

## 2021-01-01 RX ADMIN — ASPIRIN 81 MG CHEWABLE TABLET 81 MG: 81 TABLET CHEWABLE at 10:07

## 2021-01-01 RX ADMIN — Medication 10 ML: at 14:00

## 2021-01-01 RX ADMIN — CARVEDILOL 6.25 MG: 6.25 TABLET, FILM COATED ORAL at 17:56

## 2021-01-01 RX ADMIN — INSULIN LISPRO 2 UNITS: 100 INJECTION, SOLUTION INTRAVENOUS; SUBCUTANEOUS at 17:56

## 2021-01-01 RX ADMIN — ASPIRIN 81 MG CHEWABLE TABLET 81 MG: 81 TABLET CHEWABLE at 10:52

## 2021-01-01 RX ADMIN — ATORVASTATIN CALCIUM 40 MG: 40 TABLET, FILM COATED ORAL at 22:18

## 2021-01-01 RX ADMIN — HYDRALAZINE HYDROCHLORIDE 25 MG: 25 TABLET, FILM COATED ORAL at 10:07

## 2021-01-01 RX ADMIN — ATORVASTATIN CALCIUM 40 MG: 40 TABLET, FILM COATED ORAL at 21:52

## 2021-01-01 RX ADMIN — SODIUM CHLORIDE 1 G: 9 INJECTION INTRAMUSCULAR; INTRAVENOUS; SUBCUTANEOUS at 18:11

## 2021-01-01 RX ADMIN — CARVEDILOL 6.25 MG: 6.25 TABLET, FILM COATED ORAL at 17:23

## 2021-01-01 RX ADMIN — LIDOCAINE HYDROCHLORIDE 80 MG: 20 INJECTION, SOLUTION EPIDURAL; INFILTRATION; INTRACAUDAL; PERINEURAL at 10:12

## 2021-01-01 RX ADMIN — ISOSORBIDE DINITRATE 10 MG: 10 TABLET ORAL at 21:52

## 2021-01-01 RX ADMIN — Medication 10 ML: at 17:56

## 2021-01-01 RX ADMIN — INSULIN LISPRO 2 UNITS: 100 INJECTION, SOLUTION INTRAVENOUS; SUBCUTANEOUS at 12:50

## 2021-01-01 RX ADMIN — PERFLUTREN 1.5 ML: 6.52 INJECTION, SUSPENSION INTRAVENOUS at 10:17

## 2021-01-01 RX ADMIN — Medication 20 ML: at 14:50

## 2021-01-01 RX ADMIN — INSULIN LISPRO 2 UNITS: 100 INJECTION, SOLUTION INTRAVENOUS; SUBCUTANEOUS at 13:29

## 2021-01-01 RX ADMIN — CARVEDILOL 6.25 MG: 6.25 TABLET, FILM COATED ORAL at 10:00

## 2021-01-01 RX ADMIN — HEPARIN SODIUM 12 UNITS/KG/HR: 10000 INJECTION, SOLUTION INTRAVENOUS at 04:51

## 2021-01-01 RX ADMIN — Medication 10 ML: at 22:00

## 2021-01-01 RX ADMIN — ATORVASTATIN CALCIUM 40 MG: 40 TABLET, FILM COATED ORAL at 22:57

## 2021-01-01 RX ADMIN — ASPIRIN 81 MG CHEWABLE TABLET 81 MG: 81 TABLET CHEWABLE at 09:00

## 2021-11-16 PROBLEM — J96.90 RESPIRATORY FAILURE (HCC): Status: ACTIVE | Noted: 2021-01-01

## 2021-11-16 NOTE — ED PROVIDER NOTES
70-year-old male with a history of diabetes, hypertension, and stroke is coming from [facility] for low oxygen saturation after being diagnosed with pneumonia 3 days ago. He says he has been taking antibiotics, but was breathing worse this morning. He has had cough, but unclear whether he has had a fever or not. Patient is not especially talkative. He says he had Covid in February and he has had the Sotelo Peter vaccine since then. He was tested recently for Covid and was reportedly negative. He does not have COPD or other pulmonary issues and says he receives dialysis Tuesday, Thursday, and Saturday. Today is Tuesday and he says he did not miss any sessions. His oxygen saturation was noted to be in the 70s at the nursing home today and EMS placed him on a nonrebreather. When he got here he was placed on 4 L nasal cannula and his oxygen saturation has been holding steady in the low to mid 90s. He says he feels better. No chest pain. No leg swelling. He has bilateral BKA's. He does not remember the name of his nephrologist.           Past Medical History:   Diagnosis Date    Diabetes ([facility]     Hypertension     Stroke ([facility] 5/16/2003       Past Surgical History:   Procedure Laterality Date    HX ORTHOPAEDIC  7/20/2010    Bunion and toe repair left foot.  HX ORTHOPAEDIC      Plate in left hip. No family history on file.     Social History     Socioeconomic History    Marital status: LEGALLY      Spouse name: Not on file    Number of children: Not on file    Years of education: Not on file    Highest education level: Not on file   Occupational History    Not on file   Tobacco Use    Smoking status: Never Smoker    Smokeless tobacco: Not on file   Substance and Sexual Activity    Alcohol use: No    Drug use: Yes     Types: Prescription    Sexual activity: Not on file   Other Topics Concern    Not on file   Social History Narrative    Not on file     Social Determinants of Health     Financial Resource Strain:     Difficulty of Paying Living Expenses: Not on file   Food Insecurity:     Worried About Running Out of Food in the Last Year: Not on file    Amy of Food in the Last Year: Not on file   Transportation Needs:     Lack of Transportation (Medical): Not on file    Lack of Transportation (Non-Medical): Not on file   Physical Activity:     Days of Exercise per Week: Not on file    Minutes of Exercise per Session: Not on file   Stress:     Feeling of Stress : Not on file   Social Connections:     Frequency of Communication with Friends and Family: Not on file    Frequency of Social Gatherings with Friends and Family: Not on file    Attends Temple Services: Not on file    Active Member of [address] [place] [address] blogfoster or Organizations: Not on file    Attends Club or Organization Meetings: Not on file    Marital Status: Not on file   Intimate Partner Violence:     Fear of Current or Ex-Partner: Not on file    Emotionally Abused: Not on file    Physically Abused: Not on file    Sexually Abused: Not on file   Housing Stability:     Unable to Pay for Housing in the Last Year: Not on file    Number of [place] in the Last Year: Not on file    Unstable Housing in the Last Year: Not on file         ALLERGIES: Patient has no known allergies. Review of Systems   Constitutional: Positive for fatigue. Negative for fever. HENT: Negative for trouble swallowing. Eyes: Negative for visual disturbance. Respiratory: Positive for cough and shortness of breath. Cardiovascular: Negative for chest pain. Gastrointestinal: Positive for vomiting. Negative for abdominal pain. Genitourinary: Negative for difficulty urinating. Musculoskeletal: Negative for back pain. Skin: Negative for rash. Neurological: Negative for headaches. Hematological: Does not bruise/bleed easily. Psychiatric/Behavioral: Negative for sleep disturbance.        Vitals:    11/16/21 4756 11/16/21 5917 BP:  106/81   Pulse:  90   Resp:  20   Temp:  98.7 °F (37.1 °C)   SpO2:  93%   Weight: 79.6 kg (175 lb 7.8 oz)    Height: 6' 3\" (1.905 m)             Physical Exam  Constitutional:       Appearance: Normal appearance. HENT:      Head: Normocephalic. Nose: Nose normal.      Mouth/Throat:      Mouth: Mucous membranes are moist.   Eyes:      Extraocular Movements: Extraocular movements intact. Conjunctiva/sclera: Conjunctivae normal.   Cardiovascular:      Rate and Rhythm: Normal rate. Comments: Good thrill and good bruit right upper extremity  Pulmonary:      Effort: Pulmonary effort is normal. No respiratory distress. Abdominal:      General: Abdomen is flat. Musculoskeletal:         General: Normal range of motion. Comments: Bilateral below the knee amputations   Skin:     Findings: No rash. Neurological:      General: No focal deficit present. Mental Status: He is alert.       Comments: Brace on the left wrist, presumably some left-sided deficits from his prior stroke  There might be some cognitive deficits from his stroke versus cognitive deficits from his current illness/hypoxia   Psychiatric:         Behavior: Behavior normal.          MDM  Number of Diagnoses or Management Options  Acute hypoxemic respiratory failure (HCC)  Acute pulmonary edema (HCC)  Anemia due to chronic kidney disease, on chronic dialysis ([address]  Hyperglycemia  Diagnosis management comments: Perfect Serve Consult for Admission  10:03 AM    ED Room Number: ER09/09  Patient Name and age:  Acosta Cortes 78 y.o.  male  Working Diagnosis: Acute hypoxemic respiratory failure ([address]  (primary encounter diagnosis)  Acute pulmonary edema ([address]  Anemia due to chronic kidney disease, on chronic dialysis ([address]  Hyperglycemia    COVID-19 Suspicion:  no  Sepsis present:  no  Reassessment needed: no  Code Status:  Full Code  Readmission: no  Isolation Requirements:  no  Recommended Level of Care: telemetry  Department:[facility] Adult ED - [phone]  Other: Dialysis patient being treated for pneumonia as an outpatient was found to be hypoxic this morning. I treated for pneumonia, but the patient looks like he may be volume overloaded. I have also consulted nephrology to see if he needs dialysis now.            Procedures

## 2021-11-16 NOTE — PROGRESS NOTES
Transition of Care Plan:     RUR:  14%  GLOS:  TBD     LOS:    0  Disposition:  Return to SNF/[facility] [phone]  Follow up appointments: TBD  DME needed:  TBD  Transportation at Discharge: [address] vs [place] [facility] Medicare letter:  N/A  Caregiver Contact/NOK:   Gautam Trivedi [phone]  Plan of Care:    ED workup, Oxygen Sats 70's , 02 4lpm sats in low to mid 90s. bilateral above the knee amputation, COVID19 rapid negative, CXR - pulmonary edema, pneumonia  · Hospitalist Consulted for Admission  · Nephrologist Consulted  · Rapid COVID  · IV Antibiotics/Blood Cultures    Reason for Admission:   EMS from [facility] with complaints of shortness of breath                  RUR Score:          14%           Plan for utilizing home health:      Anticipate return to long term care/[facility]    PCP: First and Last Name:     Dr. Devin Giordano Director   Name of Practice:    Medical Director/[facility]   Are you a current patient: Yes/No:  Yes   Approximate date of last visit:   NP on site daily, MD weekly   Can you participate in a virtual visit with your PCP:  N/A                    Current Advanced Directive/Advance Care Plan: Full Code    Healthcare Decision Maker:     NOK:  Sister Gautam Trivedi [phone]                         Transition of Care Plan:                      Ken Rebolledo is a 78year old male to [facility] with complaints of shortness of breath. Spoke with [facility], he is in long term care at [facility] and has lived at [facility] for \"a few years\". NH confirmed NOK, sister Gautam Trivedi [phone]. He is hemodialysis patient,  2nd shift/1100/Tues/Thus/Sat  [address]. At baseline Mr. Rocio Zendejas uses electric wheelchair, he is bilateral amputee. Once prosthetics are on staff uses lift to get him into chair. He uses wheelchair van to and from dialysis. Verified facesheet/demographics.     Dr. Governor Ahumada Nephrologist  Constance Reddy [facility] [phone]      Care Management Interventions  PCP Verified by CM:  Yes (Dr. Femi Ashton, Bell Garcias )  Palliative Care Criteria Met (RRAT>21 & CHF Dx)?: No (Stretcher vs wheel chair)  Transition of Care Consult (CM Consult): Discharge Planning  MyChart Signup: No  Discharge Durable Medical Equipment: No  Health Maintenance Reviewed: Yes  Physical Therapy Consult: No  Occupational Therapy Consult: No  Speech Therapy Consult: No  Support Systems: [place] ([facility])  Confirm Follow Up Transport: Wheelchair Yessi Carmichael, RN, BSN, [facility]  ED Care Management  [phone]

## 2021-11-16 NOTE — H&P
History & Physical    Primary Care Provider: Sami Choi MD  Source of Information: Patient     History of Presenting Illness:   Humberto Blair is a 78 y.o. male who presents with shortness of breath    History was probably obtained from the patient    Patient lives at [facility], comes to the hospital today with hypoxia, patient was diagnosed with pneumonia 3 days back, has history of ESRD and is on dialysis Tuesday Thursday and Saturday. Patient reports that since last 3 days his breathing is getting worse, he is not able to breathe, has cough, woke up this morning and was quite short of breath, patient is a poor historian, reports that he was recently tested for Covid and it was negative, patient was found to have O2 level in the 70s when EMS arrived, he was placed on a nonrebreather, was brought to the hospital and placed on 4 L of oxygen, continues to be short of breath and reports that he finds it hard to breathe. Patient was requested to be admitted to the hospital service. Patient denies any other complaints or problems. The patient denies any Headache, blurry vision, sore throat, trouble swallowing, trouble with speech, chest pain, , fever, chills, N/V/D, abd pain, urinary symptoms, constipation, recent travels, sick contacts, focal or generalized neurological symptoms,  falls, injuries, rashes, contact with COVID-19 diagnosed patients, hematemesis, melena, hemoptysis, hematuria, rashes, denies starting any new medications and denies any other concerns or problems besides as mentioned above. Review of Systems:  A comprehensive review of systems was negative except for that written in the History of Present Illness.    All other systems reviewed, pertinent positives and negatives noted in HPI    Past Medical History:   Diagnosis Date    Diabetes ([facility]     Hypertension     Stroke ([facility] 5/16/2003      Past Surgical History:   Procedure Laterality Date    HX ORTHOPAEDIC  7/20/2010    Bunion and toe repair left foot.  HX ORTHOPAEDIC      Plate in left hip. Prior to Admission medications    Medication Sig Start Date End Date Taking? Authorizing Provider   oxycodone-acetaminophen (PERCOCET) 2.5-325 mg per tablet Take 2 Tabs by mouth every four (4) hours as needed. Libertad Watt MD   amlodipine (NORVASC) 10 mg tablet Take 10 mg by mouth daily. Libertad Watt MD   aspirin 81 mg tablet Take 81 mg by mouth. Libertad Watt MD   cephALEXin (KEFLEX) 500 mg capsule Take 500 mg by mouth four (4) times daily. Libertad Watt MD   enalapril (VASOTEC) 20 mg tablet Take 20 mg by mouth two (2) times a day. 8/7/10   Libertad Watt MD   ezetimibe-simvastatin (VYTORIN) 10-20 mg per tablet Take 1 Tab by mouth daily. 8/7/10   Libertad Watt MD   fluticasone (FLONASE) 50 mcg/Actuation nasal spray 2 Sprays by Nasal route daily. 8/7/10   Libertad Watt MD   glipiZIDE (GLUCOTROL) 10 mg tablet Take 10 mg by mouth two (2) times a day. Libertad Watt MD   hydrochlorothiazide (HYDRODIURIL) 25 mg tablet Take 25 mg by mouth daily. Libertad Watt MD   metformin (GLUCOPHAGE) 500 mg tablet Take 1,000 mg by mouth two (2) times daily (with meals). Libertad Watt MD   metoprolol (LOPRESSOR) 25 mg tablet Take 25 mg by mouth two (2) times a day. Libertad Watt MD   pioglitazone (ACTOS) 30 mg tablet Take 30 mg by mouth daily. Libertad Watt MD   PROCHLORPERAZINE MALEATE Take 10 mg by mouth three (3) times daily. 8/7/10   Libertad Watt MD     No Known Allergies   No family history on file. Family history was discussed with the patient, all pertinent and relevant details are mentioned as above, no other pertinent and relevant family history was noted on my discussion with the patient.   Patient specifically denies any history of Gaucher disease in the family  SOCIAL HISTORY:  Patient resides:  Independently X   Assisted Living    SNF    With family care       Smoking history:   None X Former    Chronic      Alcohol history:   None    Social X   Chronic      Ambulates:   Independently X   w/cane    w/walker    w/wc    CODE STATUS:  DNR    Full X   Other      Objective:     Physical Exam:     Visit Vitals  /88   Pulse 100   Temp 98 °F (36.7 °C)   Resp 29   Ht 6' 3\" (1.905 m)   Wt 79.6 kg (175 lb 7.8 oz)   SpO2 94%   BMI 21.93 kg/m²    O2 Flow Rate (L/min): 4 l/min O2 Device: Nasal cannula    General : alert x 2, ill-appearing male  HEENT: PEERL, moist mucus membrane, TM clear  Neck: supple,   Chest: Decreased basal breath sounds with crackles,  CVS: Tachycardic  Abd: soft/ Non tender, non distended, BS physiological,   Ext: Bilateral BKA  Neuro/Psych: Patient not cooperative with exam, moves all 4, strength 4/5 bilateral upper extremity  Skin: warm     EKG: Sinus arrhythmia with PVCs      Data Review:     Recent Days:  Recent Labs     11/16/21  0648   WBC 9.6   HGB 9.2*   HCT 28.0*        Recent Labs     11/16/21  0648      K 3.8   CL 96*   CO2 29   *   BUN 60*   CREA 9.04*   CA 9.7   ALB 2.7*   ALT 19     No results for input(s): PH, PCO2, PO2, HCO3, FIO2 in the last 72 hours.     24 Hour Results:  Recent Results (from the past 24 hour(s))   EKG, 12 LEAD, INITIAL    Collection Time: 11/16/21  6:44 AM   Result Value Ref Range    Ventricular Rate 92 BPM    Atrial Rate 92 BPM    P-R Interval 170 ms    QRS Duration 108 ms    Q-T Interval 398 ms    QTC Calculation (Bezet) 492 ms    Calculated P Axis 15 degrees    Calculated R Axis -30 degrees    Calculated T Axis 142 degrees    Diagnosis       Sinus rhythm with sinus arrhythmia with frequent premature ventricular   complexes  Left axis deviation  Left ventricular hypertrophy with repolarization abnormality ( [employer]   product )  Cannot rule out Septal infarct , age undetermined  Abnormal ECG  No previous ECGs available     SAMPLES BEING HELD    Collection Time: 11/16/21  6:48 AM   Result Value Ref Range    SAMPLES BEING HELD 1BLU,1RED     COMMENT        Add-on orders for these samples will be processed based on acceptable specimen integrity and analyte stability, which may vary by analyte. CBC WITH AUTOMATED DIFF    Collection Time: 11/16/21  6:48 AM   Result Value Ref Range    WBC 9.6 4.1 - 11.1 K/uL    RBC 3.02 (L) 4.10 - 5.70 M/uL    HGB 9.2 (L) 12.1 - 17.0 g/dL    HCT 28.0 (L) 36.6 - 50.3 %    MCV 92.7 80.0 - 99.0 FL    MCH 30.5 26.0 - 34.0 PG    MCHC 32.9 30.0 - 36.5 g/dL    RDW 15.9 (H) 11.5 - 14.5 %    PLATELET 943 864 - 443 K/uL    MPV 11.1 8.9 - 12.9 FL    NRBC 0.0 0  WBC    ABSOLUTE NRBC 0.00 0.00 - 0.01 K/uL    NEUTROPHILS 89 (H) 32 - 75 %    LYMPHOCYTES 6 (L) 12 - 49 %    MONOCYTES 5 5 - 13 %    EOSINOPHILS 0 0 - 7 %    BASOPHILS 0 0 - 1 %    IMMATURE GRANULOCYTES 0 0.0 - 0.5 %    ABS. NEUTROPHILS 8.5 (H) 1.8 - 8.0 K/UL    ABS. LYMPHOCYTES 0.6 (L) 0.8 - 3.5 K/UL    ABS. MONOCYTES 0.5 0.0 - 1.0 K/UL    ABS. EOSINOPHILS 0.0 0.0 - 0.4 K/UL    ABS. BASOPHILS 0.0 0.0 - 0.1 K/UL    ABS. IMM. GRANS. 0.0 0.00 - 0.04 K/UL    DF SMEAR SCANNED      RBC COMMENTS ANISOCYTOSIS  1+       METABOLIC PANEL, COMPREHENSIVE    Collection Time: 11/16/21  6:48 AM   Result Value Ref Range    Sodium 136 136 - 145 mmol/L    Potassium 3.8 3.5 - 5.1 mmol/L    Chloride 96 (L) 97 - 108 mmol/L    CO2 29 21 - 32 mmol/L    Anion gap 11 5 - 15 mmol/L    Glucose 249 (H) 65 - 100 mg/dL    BUN 60 (H) 6 - 20 MG/DL    Creatinine 9.04 (H) 0.70 - 1.30 MG/DL    BUN/Creatinine ratio 7 (L) 12 - 20      GFR est AA 7 (L) >60 ml/min/1.73m2    GFR est non-AA 6 (L) >60 ml/min/1.73m2    Calcium 9.7 8.5 - 10.1 MG/DL    Bilirubin, total 0.4 0.2 - 1.0 MG/DL    ALT (SGPT) 19 12 - 78 U/L    AST (SGOT) 15 15 - 37 U/L    Alk.  phosphatase 69 45 - 117 U/L    Protein, total 7.2 6.4 - 8.2 g/dL    Albumin 2.7 (L) 3.5 - 5.0 g/dL    Globulin 4.5 (H) 2.0 - 4.0 g/dL    A-G Ratio 0.6 (L) 1.1 - 2.2     LACTIC ACID    Collection Time: 11/16/21  6:48 AM   Result Value Ref Range Lactic acid 3.2 (HH) 0.4 - 2.0 MMOL/L   COVID-19 RAPID TEST    Collection Time: 11/16/21  8:36 AM   Result Value Ref Range    Specimen source Nasopharyngeal      COVID-19 rapid test Not detected NOTD           Imaging:   XR CHEST PORT    Result Date: 11/16/2021  1. The above findings are most consistent with pulmonary edema. Superimposed pneumonia would be difficult to exclude. Assessment/Plan     Acute hypoxic respiratory failure  Pulmonary edema  Pneumonia (healthcare associated)  Lactic acidosis  Diabetes mellitus type 2 with hyperglycemia  ESRD on HD  Anemia  Hypertension       Patient will be admitted on a telemetry bed  Acute hypoxic respiratory failure likely secondary to pulmonary edema with superimposed pneumonia, spoke with nephrology, patient needing HD, nephrology to arrange HD today, broad-spectrum IV antibiotics, blood cultures, duo nebs, place patient on BiPAP, ABG, strict I's and O's, close monitoring, telemetry and further intervention per hospital course  Lactic acidosis likely secondary to above, trend lactate, patient currently does not meet sepsis criteria but is at risk for sepsis closely monitor and further intervention per hospital course  Sliding-scale insulin, Accu-Cheks, diet control, close monitoring, hold Metformin and other oral hypoglycemics, continue to monitor  Nephrology consult requested nephrology to arrange HD  Monitor H&H,  Optimize blood pressure control    GI DVT prophylaxis: Patient will be on heparin     CRITICAL CARE ATTESTATION:  I had a face to face encounter with the patient, reviewed and interpreted patient data including clinical events, labs, images, vital signs, I/O's, and examined patient. I have discussed the case and the plan and management of the patient's care with the consulting services, the bedside nurses and necessary ancillary providers.        NOTE OF PERSONAL INVOLVEMENT IN CARE   This patient has a high probability of imminent, clinically significant deterioration, which requires the highest level of preparedness to intervene urgently. I participated in the decision-making and personally managed or directed the management of the following life and organ supporting interventions that required my frequent assessment to treat or prevent imminent deterioration. I personally spent 48  minutes of critical care time. This is time spent at this critically ill patient's bedside actively involved in patient care as well as the coordination of care and discussions with the patient's family. This does not include any procedural time which has been billed separately. Please note that this dictation was completed with [employer], the [employer] voice recognition software. Quite often unanticipated grammatical, syntax, homophones, and other interpretive errors are inadvertently transcribed by the computer software. Please disregard these errors. Please excuse any errors that have escaped final proofreading.          Signed By: Vijaya Singh MD     November 16, 2021

## 2021-11-16 NOTE — ED NOTES
Bedside and Verbal shift change report given to Mika Springer (oncoming nurse) by Dev Breaux (offgoing nurse). Report included the following information SBAR, ED Summary and MAR.

## 2021-11-16 NOTE — PROGRESS NOTES
Called by ER about the consult   D/w Dr Mandi Art -- he has already discussed the case w/ Dr Arlene Rausch MD  [facility]  Office :[phone]  Fax: [phone]

## 2021-11-16 NOTE — SENIOR SERVICES NOTE
SSED Review. Writer called and spoke with Unit Manager, Ansley Bose at [facility] and [address] [place] [address], [phone], where patient is currently living. HIPAA identifiers performed for patient. Ansley Bose able to verify that patient did not receive any medications at the facility today and confirmed updated medication list that was sent with patient. Medication Reconciliation performed and updated according to documents rcvd from patients facility. 1319-  I called and spoke with pharmacist, Gwen Ruano. Made aware that the chart is updated according to the facility list and scanned into the chart under Media.      Patient RN updated, patient is admitted and will be further managed by inpatient team.   Johanny Martinez 371, NP  Senior Services Emergency Department  1:21 PM

## 2021-11-16 NOTE — PROCEDURES
Hemodialysis / [phone]    Vitals Pre Post Assessment Pre Post   BP BP: 139/85 (11/16/21 1815) 129/87 LOC A/O A/O   HR Pulse (Heart Rate): 93 (11/16/21 1815) 100 Lungs Diminished Diminished   Resp Resp Rate: 28 (11/16/21 1815) 16 Cardiac tachy tachy   Temp Temp: 98 °F (36.7 °C) (11/16/21 1815) 98.2 Skin AVF CDI AVF CDI   Weight  ns na Edema None noted None noted   Tele status bedside bedside Pain  0 0     Orders   Duration: Start: 6622 End: 2115 Total: 3.0   Dialyzer: Dialyzer/Set Up Inspection: Max Ka (11/16/21 1815)   K Bath: Dialysate K (mEq/L): 3 (11/16/21 1815)   Ca Bath: Dialysate CA (mEq/L): 2.5 (11/16/21 1815)   Na: Dialysate NA (mEq/L): 140 (11/16/21 1815)   Bicarb: Dialysate HCO3 (mEq/L): 35 (11/16/21 1815)   Target Fluid Removal: Goal/Amount of Fluid to Remove (mL): 2500 mL (11/16/21 1815)     Access   Type & Location: PAUL AVF: skin CDI. No s/s of infection. + B/T. AVF cleaned per p&p. No issues with cannulation or hemostasis. 15gx2 needles secured/aspirated/NaCl flushed per p&p   Comments:                                        Labs   HBsAg (Antigen) / date: 11/16/2021  negative                                             HBsAb (Antibody) / date: 11/16/2021 immune   Source: CC   Obtained/Reviewed  Critical Results Called HGB   Date Value Ref Range Status   11/16/2021 9.2 (L) 12.1 - 17.0 g/dL Final     Potassium   Date Value Ref Range Status   11/16/2021 3.8 3.5 - 5.1 mmol/L Final     Calcium   Date Value Ref Range Status   11/16/2021 9.7 8.5 - 10.1 MG/DL Final     BUN   Date Value Ref Range Status   11/16/2021 60 (H) 6 - 20 MG/DL Final     Creatinine   Date Value Ref Range Status   11/16/2021 9.04 (H) 0.70 - 1.30 MG/DL Final        Meds Given   Name Dose Route                    Adequacy / Fluid    Total Liters Process: 67.9   Net Fluid Removed: 2000mL      Comments   Time Out Done:    5333   Admitting Diagnosis: Respiratory Failure   Consent obtained/signed: Informed Consent Verified:  Yes (11/16/21 1815)   Machine / RO # Machine Number: [id number] (11/16/21 9123)   Primary Nurse Rpt Pre: MEAGAN Montana RN   Primary Nurse Rpt Post: LUCIA Beal RN   Pt Education: Procedural   Care Plan: HD POC   Pts outpatient clinic: [address]     Tx Summary   Comments:                         0562: Treatment initiated  2115: Tx ended. VSS. All possible blood returned to patient. Hemostasis achieved without issue. Bed locked and in the lowest position, call bell and belongings in reach. SBAR given to Primary, RN. Patient is stable at time of my departure.

## 2021-11-16 NOTE — CONSULTS
Assessment:  ESRD: TTS -> 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 Discussed with [facility] team  UF as tolerated  IV Abx  WARREN  Renally adjust new meds  Am labs    Discussed with patient  Thanks for the consultation. Renal service will follow patient with you. Please contact me with any questions or concerns. Initial Consult note         Patient name: Tana Early  MR no: [id number]  Date of admission: 11/16/2021  Date of consultation: 11/16/2021  Requested by: Dr. Kenton Garcia  Reason for consult: ESRD    Patient seen and examined. History obtained from patient and chart review. Relevant labs, data and notes reviewed. HPI: Tana Early is a 78 y.o. male with PMH significant for ESRD on chronic HD TTS at [facility] [address] [facility]), HTN, DM2, CVA, PVD s/p b/l BKA presented to ER today with worsening SOB. Limited historian but able to tell me he was recently diagnosed with PNA. No improvement with oral abx. CXR in ED c/w PNA with pulm edema. Nephrology consulted to evaluate and manage ESRD. Reports last HD was 11/13/21. No issues with last treatment. No f/c. No n/v.     PMH:  Past Medical History:   Diagnosis Date    Diabetes ([facility]     Hypertension     Stroke ([facility] 5/16/2003     PSH:  Past Surgical History:   Procedure Laterality Date    HX ORTHOPAEDIC  7/20/2010    Bunion and toe repair left foot.  HX ORTHOPAEDIC      Plate in left hip. Social history:   Social History     Tobacco Use    Smoking status: Never Smoker    Smokeless tobacco: Not on file   Substance Use Topics    Alcohol use: No    Drug use: Yes     Types: Prescription       Family history:  No history of CKD or ESRD in the family.      Allergies   Allergen Reactions    Adhesive Tape-Silicones Rash       Current Facility-Administered Medications   Medication Dose Route Frequency Last Admin    sodium chloride (NS) flush 5-10 mL  5-10 mL IntraVENous PRN      sodium chloride (NS) flush 5-40 mL  5-40 mL IntraVENous Q8H      sodium chloride (NS) flush 5-40 mL  5-40 mL IntraVENous PRN      acetaminophen (TYLENOL) tablet 650 mg  650 mg Oral Q4H PRN      glucose chewable tablet 16 g  4 Tablet Oral PRN      dextrose (D50W) injection syrg 12.5-25 g  25-50 mL IntraVENous PRN      glucagon (GLUCAGEN) injection 1 mg  1 mg IntraMUSCular PRN      insulin lispro (HUMALOG) injection   SubCUTAneous Q6H 2 Units at 11/16/21 1402     Current Outpatient Medications   Medication Sig Dispense    sevelamer carbonate (Renvela) 0.8 gram pwpk oral powder 0.8 g three (3) times daily (with meals).  atorvastatin (LIPITOR) 40 mg tablet 40 mg nightly.  azithromycin (ZITHROMAX) 250 mg tablet 250 mg.     Combigan 0.2-0.5 % drop ophthalmic solution Administer 1 Drop to left eye every twelve (12) hours. Glaucoma     chlorproMAZINE (THORAZINE) 50 mg tablet 50 mg three (3) times daily.  clopidogreL (PLAVIX) 75 mg tab 75 mg daily.  gabapentin (NEURONTIN) 100 mg capsule 100 mg nightly.  insulin lispro (HUMALOG) 100 unit/mL injection by SubCUTAneous route Before breakfast, lunch, dinner and at bedtime. Inject per sliding scale, 0-200= 0; 201-250= 2 units; 251-300= 4 units; 301-350= 6 units; 351-400= 8 units; 401-999= 10 units. Greater than or equal to 401, give 10 units and CALL MD/NP, subcutaneously before meals and at bedtime for DM.  latanoprost (XALATAN) 0.005 % ophthalmic solution Administer 1 Drop to left eye nightly. Unspecified Glaucoma     omeprazole (PRILOSEC) 40 mg capsule 40 mg daily.  predniSONE (DELTASONE) 50 mg tablet 50 mg daily. For pneumonia.  sertraline (ZOLOFT) 50 mg tablet 50 mg daily.      tamsulosin (FLOMAX) 0.4 mg capsule 0.4 mg.     albuterol (PROVENTIL HFA, VENTOLIN HFA, PROAIR HFA) 90 mcg/actuation inhaler Take 2 Puffs by inhalation every four (4) hours as needed for Shortness of Breath.  glucose blood VI test strips (blood glucose test) strip by Does Not Apply route See Admin Instructions.  Doxepin 3 mg tab Take 3 mg by mouth nightly.  gabapentin (NEURONTIN) 100 mg capsule Take 100 mg by mouth Every Tues, Thur & Sat. Give 1 Capsule by mouth every Tuesday, Thursday and Saturday for unspecified pain.  multivitamin (ONE A DAY) tablet Take 1 Tablet by mouth daily.  senna (Senna) 8.6 mg tablet Take 1 Tablet by mouth nightly.  ascorbic acid, vitamin C, (Vitamin C) 500 mg tablet Take 500 mg by mouth two (2) times a day.  ergocalciferol (Vitamin D2) 1,250 mcg (50,000 unit) capsule Take 50,000 Units by mouth every seven (7) days. EVERY Monday.  zinc sulfate (ZINCATE) 50 mg zinc (220 mg) capsule Take 1 Capsule by mouth daily.  amlodipine (NORVASC) 10 mg tablet Take 10 mg by mouth daily.  aspirin 81 mg tablet Take 81 mg by mouth daily.  Glutose-15 40 % oral gel      fluconazole (DIFLUCAN) 100 mg tablet 100 mg Every Tues, Thur & Sat. For Candidiasis, Unspecified     glucagon (Glucagon Emergency Kit, human,) 1 mg solr by Injection route. Inject 1 gram, intramuscularly as needed for Hypoglycemia Episode Give 1 gram for Blood sugar <60. NOTIFY MD.    Clemens sodium chloride (OCEAN) 0.65 % nasal squeeze bottle 1 Dickens by Both Nostrils route three (3) times daily as needed for Congestion.  acetaminophen (TylenoL) 325 mg tablet Take 650 mg by mouth every four (4) hours as needed for Pain. ROS (besides HPI):    General: No fever. No weight changes  ENT: No hearing loss or visual changes  Cardiovascular: No Chest pain  Pulmonary: +SOB  GI: No abdominal pain. No Nausea/Vomiting/Diarrhea. No blood in stool  : No blood in urine. No foamy or cloudy urine  Musculoskeletal: No joint swelling or redness.  No morning stiffness  Endocrine: no cold or heat intolerance  Psych: denies anxiety or depression  Neuro: No light headedness or dizziness    Objective   Visit Vitals  /78   Pulse 92   Temp 98 °F (36.7 °C)   Resp 29   Ht 6' 3\" (1.905 m)   Wt 79.6 kg (175 lb 7.8 oz)   SpO2 93%   BMI 21.93 kg/m²       Physical Exam:    Gen: NAD    HEENT: AT/NC, EOMI, moist mucous membrane, no scleral icterus    Neck: no JVD, no cervical lymphadenopathy, no carotid bruit    Lungs/Chest wall: Breath sounds normal. Symmetrical chest wall expansion. No accessory muscle use. Clear to auscultation    Cardiovascular: Normal S1/S2, normal rate, regular rhythm. Abdomen: soft, NT, ND, BS+, no HSM    Ext: B/L BKA. +R forearm AVF +b/t. No edema    Skin: warm and dry. No rashes    : no chen    CNS: alert awake. Labs/Data:    Lab Results   Component Value Date/Time    Sodium 136 11/16/2021 06:48 AM    Potassium 3.8 11/16/2021 06:48 AM    Chloride 96 (L) 11/16/2021 06:48 AM    CO2 29 11/16/2021 06:48 AM    Anion gap 11 11/16/2021 06:48 AM    Glucose 249 (H) 11/16/2021 06:48 AM    BUN 60 (H) 11/16/2021 06:48 AM    Creatinine 9.04 (H) 11/16/2021 06:48 AM    BUN/Creatinine ratio 7 (L) 11/16/2021 06:48 AM    GFR est AA 7 (L) 11/16/2021 06:48 AM    GFR est non-AA 6 (L) 11/16/2021 06:48 AM    Calcium 9.7 11/16/2021 06:48 AM       Lab Results   Component Value Date/Time    WBC 9.6 11/16/2021 06:48 AM    HGB 9.2 (L) 11/16/2021 06:48 AM    HCT 28.0 (L) 11/16/2021 06:48 AM    PLATELET 388 29/64/5306 06:48 AM    MCV 92.7 11/16/2021 06:48 AM       Urine analysis: No results found for this or any previous visit.       No components found for: SPEP, UPEP  No results found for: PUQ, PROTU2, PROTU1, BJP1, CPE1, IMEL1, MET2  No results found for: MCACR, MCA1, MCA2, MCA3, MCAU, MCAU2, MCALPOCT      Intake/Output Summary (Last 24 hours) at 11/16/2021 1522  Last data filed at 11/16/2021 1052  Gross per 24 hour   Intake 150 ml   Output    Net 150 ml       Wt Readings from Last 3 Encounters: 11/16/21 79.6 kg (175 lb 7.8 oz)   08/07/10 98 kg (216 lb)       Signed by:  Pradeep Avery MD  Nephrology and Hypertension  [facility]

## 2021-11-16 NOTE — ED TRIAGE NOTES
Pt arrives via EMS from [facility] w/ CC SOB. Recently dx w/ pneumonia, Nursing home staff found him w/ O2 SATS in the 70s on RA. Placed him on 2L NC w/ no improvement, EMS placed him on NRB mask and O2 SATS now in upper 90s upon arrival. Had a negative covid test recently. Pt appears drowsy upon arrival.     Hx CVA, HTN, ESRD, Tues Thurs Sat dialysis.

## 2021-11-17 NOTE — ED NOTES
Bedside and Verbal shift change report given to Yakelin (oncoming nurse) by Maria Teresa Sung RN (offgoing nurse). Report included the following information SBAR, ED Summary and MAR.

## 2021-11-17 NOTE — ROUTINE PROCESS
TRANSFER - OUT REPORT:    Verbal report given to DEO Franklin(name) on Anthony Victor  being transferred to , room 417(unit) for routine progression of care       Report consisted of patients Situation, Background, Assessment and   Recommendations(SBAR). Information from the following report(s) SBAR, ED Summary, [facility] and Recent Results was reviewed with the receiving nurse. Lines:   Peripheral IV 11/16/21 Left Antecubital (Active)   Site Assessment Clean, dry, & intact 11/17/21 0101   Phlebitis Assessment 0 11/17/21 0101   Infiltration Assessment 0 11/17/21 0101   Dressing Status Clean, dry, & intact 11/17/21 0101   Hub Color/Line Status Pink 11/17/21 0101        Opportunity for questions and clarification was provided.       Patient transported with:   Registered Nurse

## 2021-11-17 NOTE — PROGRESS NOTES
Pharmacy renal dosing protocol: Day #1 of cefepime  Indication:  PNA  Current regimen:  2g q8h    Recent Labs     21  0353 21  0648   WBC 8.0 9.6   CREA 6.38* 9.04*   BUN 35* 60*     Est CrCl: ESRD on HD  TTS  Temp (24hrs), Av.5 °F (36.9 °C), Min:98 °F (36.7 °C), Max:99 °F (37.2 °C)    Plan: Change to 1g IV q24h to be administered  after hemodialysis on HD days

## 2021-11-17 NOTE — PROGRESS NOTES
Overnight Hospitalist Progress Note    Name: Lev Atkins  YOB: 1955  MRN: [id number]  Admission Date: 11/16/2021    Date of service: 11/17/21, 1:44 AM          ____________________________________________________________________________    Notified by ED nurse at 3166 for critical troponin result 2,136. This is up from 1,474 on admission at [id number] today. Patient is a 65-year-old male admitted earlier today for hypoxia, thought to be related to pneumonia diagnosed at another facility. He is Covid negative. PMH significant for diabetes and ESRD. Had dialysis today in the ED. Currently has no complaint, denies chest pain, shortness of breath, palpitations    · Alert, oriented x3.  No acute distress  · Chest clear, no rales, rhonchi or wheezing  · Heart rate irregular,  · Abdomen soft, nontender not distended  · Bilateral BKA's, trace edema    Patient Vitals for the past 12 hrs:   Temp Pulse Resp BP SpO2   11/16/21 2115 98.2 °F (36.8 °C) 100 16 129/87    11/16/21 2100  94 23 130/87    11/16/21 2045  90 12 (!) 145/86    11/16/21 2030  (!) 103 23 (!) 145/66 100 %   11/16/21 2015  96 25 (!) 140/86 100 %   11/16/21 2000  91 22 130/85 (!) 77 %   11/16/21 1945  94 29 (!) 154/83 92 %   11/16/21 1930  96 26 (!) 141/90 95 %   11/16/21 1915  93 25 (!) 130/93 100 %   11/16/21 1900  94 28 (!) 154/86 95 %   11/16/21 1845  97 23 (!) 144/88 99 %   11/16/21 1830  (!) 103 23 (!) 134/90 96 %   11/16/21 1815 98 °F (36.7 °C) 93 28 139/85 100 %   11/16/21 1552  95 26 131/84 93 %   11/16/21 1445  92 29 123/78 93 %     Recent Results (from the past 12 hour(s))   GLUCOSE, POC    Collection Time: 11/16/21  1:57 PM   Result Value Ref Range    Glucose (POC) 225 (H) 65 - 117 mg/dL    Performed by Solomon Marrero    LACTIC ACID    Collection Time: 11/16/21  4:30 PM   Result Value Ref Range    Lactic acid 1.6 0.4 - 2.0 MMOL/L   TROPONIN-HIGH SENSITIVITY    Collection Time: 11/16/21  4:34 PM   Result Value Ref Range    Troponin-High Sensitivity 1,474 (HH) 0 - 76 ng/L   GLUCOSE, POC    Collection Time: 11/16/21  6:07 PM   Result Value Ref Range    Glucose (POC) 217 (H) 65 - 117 mg/dL    Performed by Roxane Sanon A1C WITH EAG    Collection Time: 11/16/21  6:24 PM   Result Value Ref Range    Hemoglobin A1c 7.4 (H) 4.0 - 5.6 %    Est. average glucose 166 mg/dL   TROPONIN-HIGH SENSITIVITY    Collection Time: 11/16/21 11:22 PM   Result Value Ref Range    Troponin-High Sensitivity 2,136 (HH) 0 - 76 ng/L   GLUCOSE, POC    Collection Time: 11/17/21 12:57 AM   Result Value Ref Range    Glucose (POC) 179 (H) 65 - 117 mg/dL    Performed by Dina Horton      CXR done on admission: Pulmonary vascular congestion prominently central interstitial and airspace opacities with symmetric distribution consistent with pulmonary edema, consider superimposed pneumonia. EKG done on admission: Sinus rhythm area with PVCs, rate 92.  No previous EKGs available for comparison    Elevated troponin, r/o NSTEMI  · Initial troponin 1,474 >>>2,136 on first repeat  · Patient without complaint  · Repeat EKG: still pending at the time of this note  · Initiate heparin, ACS protocol  · Echocardiogram  · Cardiology consult  · Cont w planned admission to [place] input.    ____________________________________________________________________________    Massimo Gomez MSN, RN, NP-C  [phone] or via [address] [place] [address]

## 2021-11-17 NOTE — PROGRESS NOTES
Patient name: Kayla Almodovar  MRN: [id number]    Nephrology Progress note:    Assessment:  ESRD: TTS -> 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 Patient states SOB is better. ROS:   No nausea, no vomiting  No chest pain, no shortness of breath    Exam:  Visit Vitals  /87 (BP Patient Position: At rest)   Pulse (!) 113   Temp 99 °F (37.2 °C)   Resp 28   Ht 6' 3\" (1.905 m)   Wt 79.6 kg (175 lb 7.8 oz)   SpO2 97%   BMI 21.93 kg/m²     Wt Readings from Last 3 Encounters:   11/17/21 79.6 kg (175 lb 7.8 oz)   08/07/10 98 kg (216 lb)       Intake/Output Summary (Last 24 hours) at 11/17/2021 1622  Last data filed at 11/16/2021 2115  Gross per 24 hour   Intake    Output 2000 ml   Net -2000 ml       Gen: NAD  HEENT:  AT/NC  Lungs/Chest wall: Clear. No accessory muscle use. Cardiovascular: Regular rate, normal rhythm. Abdomen/: Soft, NT, ND, BS+. Ext: B/L BKA. No peripheral edema  CNS: alert and awake.  Answers appropriately      Current Facility-Administered Medications   Medication Dose Route Frequency Last Admin    heparin 25,000 units in D5W 250 ml infusion  12-25 Units/kg/hr IntraVENous TITRATE 9 Units/kg/hr at 11/17/21 1126    aspirin chewable tablet 81 mg  81 mg Oral DAILY 81 mg at 11/17/21 1258    atorvastatin (LIPITOR) tablet 40 mg  40 mg Oral QHS      perflutren lipid microspheres (DEFINITY) in NS bolus IV  1.5 mL IntraVENous RAD ONCE      cefepime (MAXIPIME) 1 g in sterile water (preservative free) 10 mL IV syringe  1 g IntraVENous Q24H      sodium chloride (NS) flush 5-10 mL  5-10 mL IntraVENous PRN      sodium chloride (NS) flush 5-40 mL  5-40 mL IntraVENous Q8H 10 mL at 11/17/21 0621    sodium chloride (NS) flush 5-40 mL  5-40 mL IntraVENous PRN      acetaminophen (TYLENOL) tablet 650 mg  650 mg Oral Q4H PRN      glucose chewable tablet 16 g  4 Tablet Oral PRN      dextrose (D50W) injection syrg 12.5-25 g  25-50 mL IntraVENous PRN      glucagon (GLUCAGEN) injection 1 mg  1 mg IntraMUSCular PRN      insulin lispro (HUMALOG) injection   SubCUTAneous Q6H 2 Units at 11/17/21 1250       Labs/Data:    Lab Results   Component Value Date/Time    WBC 8.0 11/17/2021 03:53 AM    HGB 9.1 (L) 11/17/2021 03:53 AM    HCT 27.9 (L) 11/17/2021 03:53 AM    PLATELET 138 33/70/5366 03:53 AM    MCV 91.2 11/17/2021 03:53 AM       Lab Results   Component Value Date/Time    Sodium 138 11/17/2021 03:53 AM    Potassium 3.7 11/17/2021 03:53 AM    Chloride 101 11/17/2021 03:53 AM    CO2 28 11/17/2021 03:53 AM    Anion gap 9 11/17/2021 03:53 AM    Glucose 187 (H) 11/17/2021 03:53 AM    BUN 35 (H) 11/17/2021 03:53 AM    Creatinine 6.38 (H) 11/17/2021 03:53 AM    BUN/Creatinine ratio 5 (L) 11/17/2021 03:53 AM    GFR est AA 11 (L) 11/17/2021 03:53 AM    GFR est non-AA 9 (L) 11/17/2021 03:53 AM    Calcium 9.4 11/17/2021 03:53 AM       Patient seen and examined. Chart reviewed. Labs, data and other pertinent notes reviewed in last 24 hrs.     Discussed with patient    Signed by:  Larissa Mejía MD  [id number]

## 2021-11-17 NOTE — CONSULTS
[facility]      Cardiology Care Note                                    Initial visit      Patient Name: Cori Kim - JIN:[id number] - ZJA:[id number]  Primary Cardiologist: none ? [facility]  Consulting Cardiologist: Ayo Erickson MD  Reason for Consult: NSTEMI     Assessment/Plan/Discussion:Cardiology Attending:     Patient seen on the day of progress note and examined  reviewed  with Advance Practice Provider (CHARLA, NP,PA)     S:admit with SOB and hypoxia, had PVD s/p ezekiel amputation  On HD , has DM admit for further hemodialysis   O: mild wheeze, s/p ezekiel amputation, RRR mrg      L: Trop elevaged at 2135 indicates NSTEMI  Lab Results   Component Value Date/Time    Potassium 3.7 11/17/2021 03:53 AM     Lab Results   Component Value Date/Time    HGB 9.1 (L) 11/17/2021 03:53 AM       A/P:  Medical mangement for NSTEMI  Risk assess with Amada Rdz , get records, echo   Heparin for 24 hours, ASA and high intensity statin  Ayo Erickson MD         Subjective:   78 y.o. male who presents with chief c/o hypoxia and SOB, no chest pain. Reports he had been SOB for few days. He currently denies SOB. No chest pain. Reports +cough. He denies missing any HD session. States yestserday's session ran only 3 hours rather than his usual 4 hours. He denies any PMH of cardiac disease including MI. No history of cardiac catheterization. Assessment and Plan     1. Troponin elevation:    -possible NSTEMI    -HS Troponin trending down from peak of 2136 (1474--. [place] 2136--1702)   -12 LEAD ekg NSR, PVC, LVH   -no chest pain   -multiple risk factors   -May be a difficult case for cardiac cath for access given history of PAD with resultant BKA and ESRD with fistula RUE.-Dr. Jacobo Chan to review and determine ischemic evaluation   -Obtain Echocardiogram for now   -Continue heparin gtt- monitor hgb   -Add ASA 81 mg daily, resume high intensity statin.   -holding off beta blocker given wheezing on exam- will reassess this afternoon    2. Acute hypoxic respiratory failure   -suspect due to pulmonary edema   -O2 sats and symptoms improved post HD yesterday    3. History of HTN   -BP currently controlled   -on amlodipine PTA    4. Anemia:    -Hgb 9.2, suspect chronic given ESRD   -monitor    5. DM type II   -Hgb A1C 7.4    6. PVD hx of bilateral BKA  7. Hx of CVA   -on ASA, statin. Also on plavix PTA (?for CVA or PVD)  8. ESRD on HD   -s/p UF yesterday 2 liters. 9. PVCs:   Check K and magnesium  Further plan per Dr. Gabriela Borges       ____________________________________________________________      HPI:  Mr. Sandhya Kim is a 78 y.o. male with PMH of HTN, DM, PAD s/p bilateral BKA Rt BKA 2017, LBKA approx 2005), ESRD on HD. He presented yesterday from [facility] due to c/o SOB, hypoxia. He reportedly was found to have O2 sats in 70's on Room air. EMS was called. O2 sats improved to low 90's on NRM. He also reports that his BG was very high. (>500). He reports that he has been experiencing SOB but uncertain when SOB started. He believes he has been SOB for few days. He also reports abdominal swelling but no BLE edema. He denies any history of chest pain. Reports cough, no fevers, chills. Denies any new fatigue. He does not ambulate, uses electric wheelchair for mobility. Initial evaluation included CXR with finidngs c/w pulmonary edema, possible superimposed pneumonia. His troponins are elevated, trending down now from peak of 2136. Is followed by [facility] physicians for nephrology care  SH: former smoker but quit 30 years ago. Occasional ETOH. Remote history of cocaine use 30 years ago. FH: sister had CABG at age <67, [de-identified] a young age. \"    Patient Active Problem List   Diagnosis Code    Respiratory failure ([address] J96.90     No specialty comments available.       Review of Systems:    [] Patient unable to provide secondary to condition    CONSTITUTIONAL: fatigue but nothing new     Mouth: negative     EYES: chronic blindness right eye    CARDIOVASCULAR: SOB     RESPIRATORY: wheezing, SOB, cough,      GASTROINTESTINAL: negative, +abdominal swelling. GENITOURINARY: negative     MUSCULOSKELETAL: BLE BKA      SKIN: negative    NEUROLOGICAL: negative     PSYCHOLOGICAL: negative      HEME/LYMPH: negative    ENDOCRINE: negative        Past Medical History:   Diagnosis Date    Diabetes ([facility]     Hypertension     Stroke ([facility] 5/16/2003     Past Surgical History:   Procedure Laterality Date    HX ORTHOPAEDIC  7/20/2010    Bunion and toe repair left foot.  HX ORTHOPAEDIC      Plate in left hip. Current Facility-Administered Medications   Medication Dose Route Frequency    heparin 25,000 units in D5W 250 ml infusion  12-25 Units/kg/hr IntraVENous TITRATE    aspirin chewable tablet 81 mg  81 mg Oral DAILY    atorvastatin (LIPITOR) tablet 40 mg  40 mg Oral QHS    sodium chloride (NS) flush 5-10 mL  5-10 mL IntraVENous PRN    sodium chloride (NS) flush 5-40 mL  5-40 mL IntraVENous Q8H    sodium chloride (NS) flush 5-40 mL  5-40 mL IntraVENous PRN    acetaminophen (TYLENOL) tablet 650 mg  650 mg Oral Q4H PRN    glucose chewable tablet 16 g  4 Tablet Oral PRN    dextrose (D50W) injection syrg 12.5-25 g  25-50 mL IntraVENous PRN    glucagon (GLUCAGEN) injection 1 mg  1 mg IntraMUSCular PRN    insulin lispro (HUMALOG) injection   SubCUTAneous Q6H     Current Outpatient Medications   Medication Sig    sevelamer carbonate (Renvela) 0.8 gram pwpk oral powder 0.8 g three (3) times daily (with meals).  atorvastatin (LIPITOR) 40 mg tablet 40 mg nightly.  azithromycin (ZITHROMAX) 250 mg tablet 250 mg.    Combigan 0.2-0.5 % drop ophthalmic solution Administer 1 Drop to left eye every twelve (12) hours. Glaucoma    chlorproMAZINE (THORAZINE) 50 mg tablet 50 mg three (3) times daily.  clopidogreL (PLAVIX) 75 mg tab 75 mg daily.  gabapentin (NEURONTIN) 100 mg capsule 100 mg nightly.  insulin lispro (HUMALOG) 100 unit/mL injection by SubCUTAneous route Before breakfast, lunch, dinner and at bedtime. Inject per sliding scale, 0-200= 0; 201-250= 2 units; 251-300= 4 units; 301-350= 6 units; 351-400= 8 units; 401-999= 10 units. Greater than or equal to 401, give 10 units and CALL MD/NP, subcutaneously before meals and at bedtime for DM.  latanoprost (XALATAN) 0.005 % ophthalmic solution Administer 1 Drop to left eye nightly. Unspecified Glaucoma    omeprazole (PRILOSEC) 40 mg capsule 40 mg daily.  predniSONE (DELTASONE) 50 mg tablet 50 mg daily. For pneumonia.  sertraline (ZOLOFT) 50 mg tablet 50 mg daily.  tamsulosin (FLOMAX) 0.4 mg capsule 0.4 mg.    albuterol (PROVENTIL HFA, VENTOLIN HFA, PROAIR HFA) 90 mcg/actuation inhaler Take 2 Puffs by inhalation every four (4) hours as needed for Shortness of Breath.  glucose blood VI test strips (blood glucose test) strip by Does Not Apply route See Admin Instructions.  Doxepin 3 mg tab Take 3 mg by mouth nightly.  gabapentin (NEURONTIN) 100 mg capsule Take 100 mg by mouth Every Tues, Thur & Sat. Give 1 Capsule by mouth every Tuesday, Thursday and Saturday for unspecified pain.  multivitamin (ONE A DAY) tablet Take 1 Tablet by mouth daily.  senna (Senna) 8.6 mg tablet Take 1 Tablet by mouth nightly.  ascorbic acid, vitamin C, (Vitamin C) 500 mg tablet Take 500 mg by mouth two (2) times a day.  ergocalciferol (Vitamin D2) 1,250 mcg (50,000 unit) capsule Take 50,000 Units by mouth every seven (7) days. EVERY Monday.  zinc sulfate (ZINCATE) 50 mg zinc (220 mg) capsule Take 1 Capsule by mouth daily.  amlodipine (NORVASC) 10 mg tablet Take 10 mg by mouth daily.  aspirin 81 mg tablet Take 81 mg by mouth daily.  Glutose-15 40 % oral gel     fluconazole (DIFLUCAN) 100 mg tablet 100 mg Every Tues, Thur & Sat.  For Candidiasis, Unspecified    glucagon (Glucagon Emergency Kit, human,) 1 mg solr by Injection route. Inject 1 gram, intramuscularly as needed for Hypoglycemia Episode Give 1 gram for Blood sugar <60. NOTIFY MD. Marv Pena sodium chloride (OCEAN) 0.65 % nasal squeeze bottle 1 [place] by Both Nostrils route three (3) times daily as needed for Congestion.  acetaminophen (TylenoL) 325 mg tablet Take 650 mg by mouth every four (4) hours as needed for Pain. Allergies   Allergen Reactions    Adhesive Tape-Silicones Rash        FmHx: family history is not on file. Social Hx :  reports that he has never smoked. He does not have any smokeless tobacco history on file. He reports current drug use. Drug: Prescription. He reports that he does not drink alcohol. Objective:    Physical Exam    Vitals:   Vitals:    11/17/21 0345 11/17/21 0430 11/17/21 0545 11/17/21 0615   BP: 127/87 139/84 (!) 146/86 137/88   Pulse: 98 (!) 102 (!) 108 (!) 104   Resp: 27 23 (!) 33 28   Temp:       SpO2: 93% (!) 89% 93% 93%   Weight:       Height:           General:    Alert, cooperative, no distress, appears stated age. Rt eye blindness. Neck:   Supple,    Back:     Symmetric,    Lungs:     Faint expiratory wheeze bilaterally. No use of accessory muscles. .   Heart[de-identified]    Regular rate and rhythm, S1, S2 normal, no murmur, click, rub or gallop. Abdomen:     Soft, non-tender. Bowel sounds normal.    Extremities:   Bilateral BKA noted.  No BLE edema   Vascular:   Pulses - 2+ left radial. RUE with fistual with + thrill   Skin:   Skin color normal. No rashes or lesions on visible areas   Neurologic:   Alert, THOMAS       Telemetry: NSR, PVCs    ECG:   EKG Results     Procedure 720 Value Units Date/Time    EKG, 12 LEAD, INITIAL [[id number]]     Order Status: Sent     EKG, 12 LEAD, INITIAL [[id number]]     Order Status: Sent     EKG, 12 LEAD, INITIAL [[id number]]     Order Status: Canceled     EKG, 12 LEAD, INITIAL [[id number]] Collected: 11/16/21 0644    Order Status: Completed Updated: 11/16/21 0649     Ventricular Rate 92 BPM      Atrial Rate 92 BPM      P-R Interval 170 ms      QRS Duration 108 ms      Q-T Interval 398 ms      QTC Calculation (Bezet) 492 ms      Calculated P Axis 15 degrees      Calculated R Axis -30 degrees      Calculated T Axis 142 degrees      Diagnosis --     Sinus rhythm with sinus arrhythmia with frequent premature ventricular   complexes  Left axis deviation  Left ventricular hypertrophy with repolarization abnormality ( [employer]   product )  Cannot rule out Septal infarct , age undetermined  Abnormal ECG  No previous ECGs available            Data Review:     Radiology:   XR Results (most recent):  Results from Hospital Encounter encounter on 11/16/21    XR CHEST PORT    Narrative  EXAM: XR CHEST PORT    INDICATION: Eval for Infiltrate    COMPARISON: None. FINDINGS: A portable AP radiograph of the chest was obtained at 0757 hours. The  patient is on a cardiac monitor. The heart size at the upper limits of normal.  There is blunting of costophrenic angles and hazy opacities each lung base  consistent with pleural effusions which are small. There is pulmonary vascular  congestion and there is predominantly central interstitial and airspace  opacities with a fairly symmetric distribution. These findings are most  consistent with pulmonary edema. Superimposed pneumonia would be difficult to  exclude. Impression  1. The above findings are most consistent with pulmonary edema. Superimposed  pneumonia would be difficult to exclude. No results for input(s): CPK, TROIQ in the last 72 hours.     No lab exists for component: CKQMB, CPKMB, BMPP  Recent Labs     11/16/21  0648      K 3.8   CL 96*   CO2 29   BUN 60*   CREA 9.04*   *   CA 9.7     Recent Labs     11/17/21  0353 11/16/21  0648   WBC 8.0 9.6   HGB 9.1* 9.2*   HCT 27.9* 28.0*    168     Recent Labs     11/17/21  0354 11/16/21  0648   PTP 14.0*  --    INR 1.4*  --    AP  --  69     No results for input(s): CHOL, LDLC in the last 72 hours. No lab exists for component: TGL, HDLC,  HBA1C  No results for input(s): CRP, TSH, TSHEXT in the last 72 hours.     No lab exists for component: ESR    Corie Moore, [facility]  Narayan Dow MD      [facility] [address]  Farideh Oneil  [phone]      CC:Yamil Killian MD

## 2021-11-17 NOTE — PROGRESS NOTES
[facility]  Hospitalist Group                                                                                          Hospitalist Progress Note  Barrie Mcdaniel MD  Answering service: [phone] from in house phone        Date of Service:  2021  NAME:  Tana Early  :  1955  MRN:  [id number]      Admission Summary:   \"Delbert Temple is a 78 y.o. male who presents with shortness of breath     History was probably obtained from the patient     Patient lives at [facility], comes to the hospital today with hypoxia, patient was diagnosed with pneumonia 3 days back, has history of ESRD and is on dialysis  and Saturday. Patient reports that since last 3 days his breathing is getting worse, he is not able to breathe, has cough, woke up this morning and was quite short of breath, patient is a poor historian, reports that he was recently tested for Covid and it was negative, patient was found to have O2 level in the 70s when EMS arrived, he was placed on a nonrebreather, was brought to the hospital and placed on 4 L of oxygen, continues to be short of breath and reports that he finds it hard to breathe. Patient was requested to be admitted to the hospital service. Patient denies any other complaints or problems.     The patient denies any Headache, blurry vision, sore throat, trouble swallowing, trouble with speech, chest pain, , fever, chills, N/V/D, abd pain, urinary symptoms, constipation, recent travels, sick contacts, focal or generalized neurological symptoms,  falls, injuries, rashes, contact with COVID-19 diagnosed patients, hematemesis, melena, hemoptysis, hematuria, rashes, denies starting any new medications and denies any other concerns or problems besides as mentioned above. \"    Interval history / Subjective:   10/17/2021. No acute events overnight.  Saw patient during rounds, awake alert and oriented, in mild respiratory distress, pleasant and cooperative with physical examination, reporting mild improvement of symptoms compared to yesterday, denies any fever, chills, nausea, vomiting. P.o. tolerant. Assessment & Plan:     Acute hypoxic respiratory failure:   Likely due to acute bacterial pneumonia complicated by history of end-stage renal disease. Day #2 IV antibiotics. Day #2 IV cefepime. Received 1 dose of  IV levofloxacin in the ED. Continue broad-spectrum IV antibiotics. Continue supplemental oxygen, flutter valve, incentive spirometry, as needed DuoNeb's. Follow-up cultures. Pulmonary edema:  Likely due to volume overload caused by history of end-stage renal disease. Reports compliance with hemodialysis, on hemodialysis TTS. Nephrology on board, scheduled for hemodialysis. Continue hemodialysis diet. Pneumonia (healthcare associated): As per #1. Lactic acidosis  Vitals are stable, no signs or symptoms of sepsis. Continue treating underlying pneumonia, monitor vitals. Diabetes mellitus type 2:  Basal, prandial and sliding scale insulin. Accu-Cheks, hypoglycemic protocol. Adjust meds as needed, outpatient PCP follow-up. ESRD on HD  On hemodialysis TTS. Continue hemodialysis diet. Nephrology on board. Dialysis as per nephrology service. Anemia  No sign of acute bleeding, likely anemia of CKD. Hemoglobin not at goal. Patient may need Procrit during hemodialysis. Hypervolemic, normotensive:  Due to end-stage renal disease. Continue scheduled hemodialysis. Strict in and out. Hemodialysis diet.      Code status: Full  DVT prophylaxis: Lovenox    Care Plan discussed with: Patient/Family  Anticipated Disposition: Home w/Family  Anticipated Discharge: 24 hours to 48 hours     Hospital Problems  Never Reviewed          Codes Class Noted POA    Respiratory failure [facility]) ICD-10-CM: J96.90  ICD-9-CM: 518.81  11/16/2021 Unknown                Review of Systems:   A comprehensive review of systems was negative except for that written in the HPI. Vital Signs:    Last 24hrs VS reviewed since prior progress note. Most recent are:  Visit Vitals  /87 (BP Patient Position: At rest)   Pulse (!) 102   Temp 99 °F (37.2 °C)   Resp 28   Ht 6' 3\" (1.905 m)   Wt 79.6 kg (175 lb 7.8 oz)   SpO2 97%   BMI 21.93 kg/m²         Intake/Output Summary (Last 24 hours) at 11/17/2021 1607  Last data filed at 11/16/2021 2115  Gross per 24 hour   Intake    Output 2000 ml   Net -2000 ml        Physical Examination:     I had a face to face encounter with this patient and independently examined them on 11/17/2021 as outlined below:          Constitutional:  No acute distress, cooperative, pleasant    ENT:  Oral mucosa moist, oropharynx benign. Resp:  CTA bilaterally. No wheezing/rhonchi/rales. No accessory muscle use   CV:  Regular rhythm, normal rate, no murmurs, gallops, rubs    GI:  Soft, non distended, non tender. normoactive bowel sounds, no hepatosplenomegaly     Musculoskeletal:  No edema, warm, 2+ pulses throughout    Neurologic:  Moves all extremities. AAOx3, CN II-XII reviewed. History of CVA, residual right-sided weakness. Bilateral BKA. Data Review:    Review and/or order of clinical lab test      Labs:     Recent Labs     11/17/21 0353 11/16/21 0648   WBC 8.0 9.6   HGB 9.1* 9.2*   HCT 27.9* 28.0*    168     Recent Labs     11/17/21 0353 11/16/21 0648    136   K 3.7 3.8    96*   CO2 28 29   BUN 35* 60*   CREA 6.38* 9.04*   * 249*   CA 9.4 9.7   MG 2.1  --      Recent Labs     11/16/21  0648   ALT 19   AP 69   TBILI 0.4   TP 7.2   ALB 2.7*   GLOB 4.5*     Recent Labs     11/17/21  0856 11/17/21 0354 11/17/21 0353   INR  --  1.4*  --    PTP  --  14.0*  --    APTT 94.0*  --  27.6      No results for input(s): FE, TIBC, PSAT, FERR in the last 72 hours. No results found for: FOL, RBCF   No results for input(s): PH, PCO2, PO2 in the last 72 hours.   No results for input(s): CPK, CKNDX, TROIQ in the last 72 hours.     No lab exists for component: CPKMB  No results found for: CHOL, CHOLX, CHLST, CHOLV, HDL, HDLP, LDL, LDLC, DLDLP, TGLX, TRIGL, TRIGP, CHHD, CHHDX  Lab Results   Component Value Date/Time    Glucose (POC) 210 (H) 11/17/2021 12:41 PM    Glucose (POC) 201 (H) 11/17/2021 06:15 AM    Glucose (POC) 179 (H) 11/17/2021 12:57 AM    Glucose (POC) 217 (H) 11/16/2021 06:07 PM    Glucose (POC) 225 (H) 11/16/2021 01:57 PM     No results found for: COLOR, APPRN, SPGRU, REFSG, ERIC, PROTU, GLUCU, KETU, BILU, UROU, BEATRIS, LEUKU, GLUKE, EPSU, BACTU, WBCU, RBCU, CASTS, UCRY      Medications Reviewed:     Current Facility-Administered Medications   Medication Dose Route Frequency    heparin 25,000 units in D5W 250 ml infusion  12-25 Units/kg/hr IntraVENous TITRATE    aspirin chewable tablet 81 mg  81 mg Oral DAILY    atorvastatin (LIPITOR) tablet 40 mg  40 mg Oral QHS    perflutren lipid microspheres (DEFINITY) in NS bolus IV  1.5 mL IntraVENous RAD ONCE    cefepime (MAXIPIME) 2 g in sterile water (preservative free) 10 mL IV syringe  2 g IntraVENous Q8H    sodium chloride (NS) flush 5-10 mL  5-10 mL IntraVENous PRN    sodium chloride (NS) flush 5-40 mL  5-40 mL IntraVENous Q8H    sodium chloride (NS) flush 5-40 mL  5-40 mL IntraVENous PRN    acetaminophen (TYLENOL) tablet 650 mg  650 mg Oral Q4H PRN    glucose chewable tablet 16 g  4 Tablet Oral PRN    dextrose (D50W) injection syrg 12.5-25 g  25-50 mL IntraVENous PRN    glucagon (GLUCAGEN) injection 1 mg  1 mg IntraMUSCular PRN    insulin lispro (HUMALOG) injection   SubCUTAneous Q6H     ______________________________________________________________________  EXPECTED LENGTH OF STAY: 4d 2h  ACTUAL LENGTH OF STAY:          1                 Lynnette Machuca MD

## 2021-11-18 PROBLEM — I21.4 NSTEMI (NON-ST ELEVATED MYOCARDIAL INFARCTION) (HCC): Status: ACTIVE | Noted: 2021-01-01

## 2021-11-18 PROBLEM — I25.10 CORONARY ARTERY DISEASE INVOLVING NATIVE CORONARY ARTERY OF NATIVE HEART WITHOUT ANGINA PECTORIS: Chronic | Status: ACTIVE | Noted: 2021-01-01

## 2021-11-18 PROBLEM — N18.6 END STAGE RENAL DISEASE (HCC): Status: ACTIVE | Noted: 2019-05-27

## 2021-11-18 PROBLEM — I50.23 SYSTOLIC CHF, ACUTE ON CHRONIC (HCC): Status: ACTIVE | Noted: 2021-01-01

## 2021-11-18 PROBLEM — N18.9 ANEMIA IN CHRONIC KIDNEY DISEASE: Status: ACTIVE | Noted: 2019-05-27

## 2021-11-18 PROBLEM — D63.1 ANEMIA IN CHRONIC KIDNEY DISEASE: Status: ACTIVE | Noted: 2019-05-27

## 2021-11-18 PROBLEM — E08.21 DIABETES MELLITUS DUE TO UNDERLYING CONDITION WITH DIABETIC NEPHROPATHY (HCC): Status: ACTIVE | Noted: 2019-05-27

## 2021-11-18 NOTE — PROGRESS NOTES
11/16/21    ECHO ADULT COMPLETE 11/18/2021 11/18/2021    Interpretation Summary  · LV: Estimated LVEF is 20 - 25%. Normal wall thickness. Mildly dilated left ventricle. Moderate-to-severely and segmentally reduced systolic function. Severe hypokinesis of the basal anterolateral, mid anterolateral and apical lateral wall(s). Dyskinesis of the basal inferoseptal and mid anteroseptal wall(s). · AV: Severe aortic valve focal thickening present. Moderate aortic valve sclerosis with no evidence of reduced excursion. Aortic valve leaflet calcification present. · MV: Mitral valve thickening. Mitral valve leaflet calcification without reduced excursion. Mild to moderate mitral valve regurgitation is present. · TV: Mild tricuspid valve regurgitation is present. · LA: Mildly dilated left atrium.     Signed by: Linda Lopez MD on 11/18/2021 12:08 AM

## 2021-11-18 NOTE — PROGRESS NOTES
[facility] [facility]  Hospitalist Group                                                                                          Hospitalist Progress Note  Rebekah Prescott MD  Answering service: [phone] from in house phone        Date of Service:  2021  NAME:  Clif Salguero  :  1955  MRN:  [id number]      Admission Summary:   \"Delbert Damon is a 78 y.o. male who presents with shortness of breath     History was probably obtained from the patient     Patient lives at [facility], comes to the hospital today with hypoxia, patient was diagnosed with pneumonia 3 days back, has history of ESRD and is on dialysis  and Saturday. Patient reports that since last 3 days his breathing is getting worse, he is not able to breathe, has cough, woke up this morning and was quite short of breath, patient is a poor historian, reports that he was recently tested for Covid and it was negative, patient was found to have O2 level in the 70s when EMS arrived, he was placed on a nonrebreather, was brought to the hospital and placed on 4 L of oxygen, continues to be short of breath and reports that he finds it hard to breathe. Patient was requested to be admitted to the hospital service. Patient denies any other complaints or problems.     The patient denies any Headache, blurry vision, sore throat, trouble swallowing, trouble with speech, chest pain, , fever, chills, N/V/D, abd pain, urinary symptoms, constipation, recent travels, sick contacts, focal or generalized neurological symptoms,  falls, injuries, rashes, contact with COVID-19 diagnosed patients, hematemesis, melena, hemoptysis, hematuria, rashes, denies starting any new medications and denies any other concerns or problems besides as mentioned above. \"    Interval history / Subjective:   10/17/2021. No acute events overnight.  Saw patient during rounds, awake alert and oriented, in mild respiratory distress, pleasant and cooperative with physical examination, reporting mild improvement of symptoms compared to yesterday, denies any fever, chills, nausea, vomiting. P.o. tolerant. 11/18/2021. No acute events overnight. Patient reporting mild improvement of respiratory symptoms, patient is more interactive today, does not appear to be in any apparent distress, denies any fever, chills, nausea, vomiting. Patient noted to have elevated troponin and severely reduced ejection fraction however he denies any anginal symptoms, he denies any orthopnea, proximal nocturnal dyspnea or any lower extremity edema. Assessment & Plan:     NSTEMI:  Significantly elevated troponin in the setting of end-stage renal.  Presented with shortness of breath however denies any chest pain. Denies any history of CAD. EKG no ischemic changes. Troponins on admission 1474, 2136, 89184 respectively  2D echo 11/17/2021 + for LVEF 20 to 25%. Severe hypokinesis. Currently on heparin drip, nuclear stress test has been ordered. Continue antiplatelets, high intensity statins, beta-blockers, ACE. Cardiology on board. Recommendations noted. Acute systolic heart failure:   No history of CAD, presenting with shortness of breath with elevated troponins. CXR positive for pulmonary edema, elevated troponins, 2D echo severely reduced ejection fraction. Continue beta-blockers, ACE. Strict in and out, cardiac diet, daily weight, scheduled hemodialysis. diet. Cardiology on board, recommendations noted. Acute hypoxic respiratory failure:   Improved, no leukocytosis, afebrile, SPO2 WNL on RA. Multifactorial likely due to acute bacterial pneumonia in the setting of acute CHF, NSTEMI complicated by end-stage renal disease. Cultures negative so far. Day #3 IV antibiotics. Day #3 IV cefepime. Received 1 dose of  IV levofloxacin in the ED. Continue broad-spectrum IV antibiotics.     Continue supplemental oxygen, flutter valve, incentive spirometry, as needed DuoNeb's. Follow-up cultures. Pulmonary edema:  Likely due to volume overload caused by history of end-stage renal disease. Reports compliance with hemodialysis, on hemodialysis TTS. Nephrology on board, scheduled for hemodialysis. Continue hemodialysis diet. Pneumonia (healthcare associated): As per #1. Lactic acidosis  Vitals are stable, no signs or symptoms of sepsis. Continue treating underlying pneumonia, monitor vitals. Diabetes mellitus type 2:  Basal, prandial and sliding scale insulin. Accu-Cheks, hypoglycemic protocol. Adjust meds as needed, outpatient PCP follow-up. ESRD on HD  On hemodialysis TTS. Continue hemodialysis diet. Nephrology on board. Dialysis as per nephrology service. Anemia  No sign of acute bleeding, likely anemia of CKD. Hemoglobin not at goal. Patient may need Procrit during hemodialysis. Hypervolemic, normotensive:  Due to end-stage renal disease. Continue scheduled hemodialysis. Strict in and out. Hemodialysis diet.      Code status: Full  DVT prophylaxis: Lovenox    Care Plan discussed with: Patient/Family  Anticipated Disposition: Home w/Family  Anticipated Discharge: 24 hours to 48 hours     Hospital Problems  Never Reviewed          Codes Class Noted POA    Coronary artery disease involving native coronary artery of native heart without angina pectoris (Chronic) ICD-10-CM: I25.10  ICD-9-CM: 414.01  11/18/2021 Yes    Overview Signed 11/18/2021 12:29 PM by Rebekah Corona MD     Cath from [facility]  6/20/17 for abnormal stress test  LM ok  LAD  prox with benny from RCA and distal LAD occluded  D1 prox 70  D2 prox 90  Circ pMLI  OM1 50%   RCA large, mid 40             * (Principal) NSTEMI (non-ST elevated myocardial infarction) ([facility] [place] [address] ICD-10-CM: I21.4  ICD-9-CM: 410.70  11/18/2021 Yes    Overview Signed 11/18/2021 12:30 PM by Rebekah Corona MD     Peak troponin 2136 11/17/21  Hx of LAD  in 2017  EF 11/17/21 25%             Systolic CHF, acute on chronic [facility]) ICD-10-CM: I50.23  ICD-9-CM: 428.23, 428.0  11/18/2021 Yes    Overview Signed 11/18/2021 12:31 PM by Ruthann Collins MD     11/16/21    ECHO ADULT COMPLETE 11/18/2021 11/18/2021    Interpretation Summary  · LV: Estimated LVEF is 20 - 25%. Normal wall thickness. Mildly dilated left ventricle. Moderate-to-severely and segmentally reduced systolic function. Severe hypokinesis of the basal anterolateral, mid anterolateral and apical lateral wall(s). Dyskinesis of the basal inferoseptal and mid anteroseptal wall(s). · AV: Severe aortic valve focal thickening present. Moderate aortic valve sclerosis with no evidence of reduced excursion. Aortic valve leaflet calcification present. · MV: Mitral valve thickening. Mitral valve leaflet calcification without reduced excursion. Mild to moderate mitral valve regurgitation is present. · TV: Mild tricuspid valve regurgitation is present. · LA: Mildly dilated left atrium. Signed by: Ruthann Collins MD on 11/18/2021 12:08 AM                Respiratory failure ([address] ICD-10-CM: J96.90  ICD-9-CM: 518.81  11/16/2021 Yes        Anemia in chronic kidney disease ICD-10-CM: N18.9, D63.1  ICD-9-CM: 285.21  5/27/2019 Yes        End stage renal disease [facility]) ICD-10-CM: N18.6  ICD-9-CM: 585.6  5/27/2019 Yes                Review of Systems:   A comprehensive review of systems was negative except for that written in the HPI. Vital Signs:    Last 24hrs VS reviewed since prior progress note.  Most recent are:  Visit Vitals  BP (!) 141/130 (BP 1 Location: Left upper arm, BP Patient Position: At rest)   Pulse (!) 109   Temp 98.6 °F (37 °C)   Resp 20   Ht 6' 3\" (1.905 m)   Wt 79.6 kg (175 lb 7.8 oz)   SpO2 98%   BMI 21.93 kg/m²         Intake/Output Summary (Last 24 hours) at 11/18/2021 1413  Last data filed at 11/18/2021 0815  Gross per 24 hour   Intake    Output 2300 ml   Net -2300 ml        Physical Examination:     I had a face to face encounter with this patient and independently examined them on 11/18/2021 as outlined below:          Constitutional:  No acute distress, cooperative, pleasant    ENT:  Oral mucosa moist, oropharynx benign. Resp:  CTA bilaterally. No wheezing/rhonchi/rales. No accessory muscle use   CV:  Regular rhythm, normal rate, no murmurs, distant heart sounds, gallops, rubs    GI:  Soft, non distended, non tender. normoactive bowel sounds, no hepatosplenomegaly     Musculoskeletal:  No edema, warm, 2+ pulses throughout    Neurologic:  Moves all extremities. AAOx3, CN II-XII reviewed. History of CVA, residual right-sided weakness. Bilateral BKA. Data Review:    Review and/or order of clinical lab test      Labs:     Recent Labs     11/18/21 0236 11/17/21 0353   WBC 7.6 8.0   HGB 9.0* 9.1*   HCT 28.4* 27.9*    156     Recent Labs     11/18/21 0440 11/18/21 0236 11/17/21 0353    136 138   K 3.6 4.0 3.7   CL 98 100 101   CO2 24 23 28   BUN 56* 59* 35*   CREA 8.75* 8.64* 6.38*   * 252* 187*   CA 9.6 9.5 9.4   MG 2.3 2.3 2.1   PHOS  --  3.7  --      Recent Labs     11/18/21 0440 11/18/21  0236 11/16/21  0648   ALT 16 18 19   AP 68 68 69   TBILI 0.8 0.8 0.4   TP 7.0 7.2 7.2   ALB 2.4* 2.4* 2.7*   GLOB 4.6* 4.8* 4.5*     Recent Labs     11/18/21 0440 11/17/21  1735 11/17/21  0856 11/17/21  0354 11/17/21  0353   INR  --   --   --  1.4*  --    PTP  --   --   --  14.0*  --    APTT 42.7* 35.5* 94.0*  --    < >    < > = values in this interval not displayed. No results for input(s): FE, TIBC, PSAT, FERR in the last 72 hours. No results found for: FOL, RBCF   No results for input(s): PH, PCO2, PO2 in the last 72 hours. No results for input(s): CPK, CKNDX, TROIQ in the last 72 hours.     No lab exists for component: CPKMB  No results found for: CHOL, CHOLX, CHLST, CHOLV, HDL, HDLP, LDL, LDLC, DLDLP, TGLX, TRIGL, TRIGP, CHHD, CHHDX  Lab Results   Component Value Date/Time    Glucose (POC) 193 (H) 11/18/2021 11:41 AM    Glucose (POC) 278 (H) 11/17/2021 11:21 PM    Glucose (POC) 233 (H) 11/17/2021 06:01 PM    Glucose (POC) 210 (H) 11/17/2021 12:41 PM    Glucose (POC) 201 (H) 11/17/2021 06:15 AM     No results found for: COLOR, APPRN, SPGRU, REFSG, ERIC, PROTU, GLUCU, KETU, BILU, UROU, BAETRIS, LEUKU, GLUKE, EPSU, BACTU, WBCU, RBCU, CASTS, UCRY      Medications Reviewed:     Current Facility-Administered Medications   Medication Dose Route Frequency    carvediloL (COREG) tablet 6.25 mg  6.25 mg Oral BID WITH MEALS    regadenoson (LEXISCAN) injection 0.4 mg  0.4 mg IntraVENous RAD ONCE    saline peripheral flush soln 20 mL  20 mL InterCATHeter RAD ONCE    heparin 25,000 units in D5W 250 ml infusion  12-25 Units/kg/hr IntraVENous TITRATE    aspirin chewable tablet 81 mg  81 mg Oral DAILY    atorvastatin (LIPITOR) tablet 40 mg  40 mg Oral QHS    cefepime (MAXIPIME) 1 g in sterile water (preservative free) 10 mL IV syringe  1 g IntraVENous Q24H    epoetin glenny-epbx (RETACRIT) injection 10,000 Units  10,000 Units SubCUTAneous DIALYSIS TUE, THU & SAT    insulin lispro (HUMALOG) injection   SubCUTAneous AC&HS    sodium chloride (NS) flush 5-10 mL  5-10 mL IntraVENous PRN    sodium chloride (NS) flush 5-40 mL  5-40 mL IntraVENous Q8H    sodium chloride (NS) flush 5-40 mL  5-40 mL IntraVENous PRN    acetaminophen (TYLENOL) tablet 650 mg  650 mg Oral Q4H PRN    glucose chewable tablet 16 g  4 Tablet Oral PRN    dextrose (D50W) injection syrg 12.5-25 g  25-50 mL IntraVENous PRN    glucagon (GLUCAGEN) injection 1 mg  1 mg IntraMUSCular PRN     ______________________________________________________________________  EXPECTED LENGTH OF STAY: 4d 2h  ACTUAL LENGTH OF STAY:          2                 Hilliard Goodpasture, MD

## 2021-11-18 NOTE — ADVANCED PRACTICE NURSE
Hemodialysis / [phone]    Vitals Pre Post Assessment Pre Post   BP BP: 124/75 (11/18/21 0645) 142/63 LOC A&O x `1 - 2 to self and place A&O x 1 - 2 to self and place   HR Pulse (Heart Rate): 94 (resting, eyes closed) (11/18/21 0645) 96 Lungs congestion clear   Resp Resp Rate: 17 (11/18/21 0645) 17 Cardiac A fib A fib   Temp Temp: 98.3 °F (36.8 °C) (11/18/21 0510) 98.2 Skin warm warm   Weight Pre-Dialysis Weight: 78.9 kg (173 lb 15.1 oz) (11/18/21 0510) 76.2 Edema generalized No edema   Tele status remote remote Pain Pain Intensity 1: 0 (11/18/21 0400) No pain     Orders   Duration: Start: 3498 End: 0815 Total: 3 hr   Dialyzer: Dialyzer/Set Up Inspection: Travar ([id number]) (11/18/21 0510)   K Bath: Dialysate K (mEq/L): 3 (11/18/21 0510)   Ca Bath: Dialysate CA (mEq/L): 2.5 (11/18/21 0510)   Na: Dialysate NA (mEq/L): 140 (11/18/21 0510)   Bicarb: Dialysate HCO3 (mEq/L): 35 (11/18/21 0510)   Target Fluid Removal: Goal/Amount of Fluid to Remove (mL): 2500 mL (11/18/21 0510)     Access   Type & Location: RLA AVF   Comments:                   Patent, B&T positive, cannulated with 15 g needles x 2                     Labs   HBsAg (Antigen) / date:     11/16/21 Negative                                          HBsAb (Antibody) / date: 11/16/21 Immune   Source: [facility]   Obtained/Reviewed  Critical Results Called HGB   Date Value Ref Range Status   11/18/2021 9.0 (L) 12.1 - 17.0 g/dL Final     Potassium   Date Value Ref Range Status   11/18/2021 3.6 3.5 - 5.1 mmol/L Final     Calcium   Date Value Ref Range Status   11/18/2021 9.6 8.5 - 10.1 MG/DL Final     BUN   Date Value Ref Range Status   11/18/2021 56 (H) 6 - 20 MG/DL Final     Creatinine   Date Value Ref Range Status   11/18/2021 8.75 (H) 0.70 - 1.30 MG/DL Final        Meds Given   Name Dose Route                    Adequacy / Fluid    Total Liters Process: 72 L   Net Fluid Removed: 2500 ml      Comments   Time Out Done:   (Time) Yes, 4761 Admitting Diagnosis: Renal failure   Consent obtained/signed: Informed Consent Verified: Yes (11/16/21 1815)   Machine / RO # Machine Number: [id number] (11/18/21 0510)   Primary Nurse Rpt Pre: Heron Claire RN   Primary Nurse Rpt Post:    Pt Education: Yes, r/t dialysis process   Care Plan: Continue HD as oprdered   Pts outpatient clinic:      Tx Summary   Comments:                 Tolerated tx well, at end, all blood in circuit returned with 300 ml NS, RLA AVF patent, B&T positive, bleeding stopped at both sites, pressure dressing in place

## 2021-11-18 NOTE — CARDIO/PULMONARY
Cardiac Rehab: Jaiden Hilliard meets criteria for cardiac rehab with an echo 20-25% and a NSTEMI with medical management. Patient is not a candidate for cardiac rehab because he lives in a facility, has ESRD with dialysis, and is a bilateral amputee.  Ronit Zaman RN

## 2021-11-18 NOTE — PROGRESS NOTES
[facility]      Cardiology Care Note                                    Initial visit      Patient Name: Benjamin Evans - GCD:[birth year] - LGZ:[id number]  Primary Cardiologist: none ? [facility]  Consulting Cardiologist: Andi Wakefield MD  Reason for Consult: NSTEMI       Assessment/Plan/Discussion:Cardiology Attending:     Patient seen on the day of progress note and examined  reviewed  with Advance Practice Provider (CHARLA, NP,PA)     S: Reports moderate shortness of breath this morning with no angina chest pain. Cath from [facility]  6/20/17 for abnormal stress test  LM ok  LAD  prox with benny from RCA and distal LAD occluded  D1 prox 70  D2 prox 90  Circ pMLI  OM1 50%   RCA large, mid 40   O: Bilateral AKA regular rate and rhythm without murmur rub or gallop fair air movement with minimal wheezing  L:  Lab Results   Component Value Date/Time    HGB 9.0 (L) 11/18/2021 02:36 AM     Lab Results   Component Value Date/Time    Creatinine 8.75 (H) 11/18/2021 04:40 AM       A/P: [facility] nuclear today records obtained from [facility] so that he had a  of the LAD with collaterals from the right. While he is ruled in with an NSTEMI with significant troponin elevation he is not having angina. I would only proceed to cardiac cath as an offer via radial approach if he either had refractory symptoms or very high risk of findings on the nuclear scan. Otherwise due to multiple comorbidities, will be managed medically with congestive heart failure and CAD. Medical therapy as below. Andi Wakefield MD          Subjective:   78 y.o. male who presents with chief c/o hypoxia and SOB, no chest pain. Reports he had been SOB for few days. He currently denies SOB. No chest pain. He has ruled in for NSTEMI. Records obtained from [facility]:  Chronic HFrEF (ef 35-40%).    Discharged from [facility] on 11/2/2021 after hospitalization for nausea and vomiting and reatment of small bowel obstruction. He had left heart catheterization and 6/20/2017 by Dr. Ivette Faria with the following findings:  Left main: Large caliber long vessel no significant disease  LAD large vessel complete occlusion within proximal vessel distal vessel fills via collaterals from RCA appears to have second occlusion within distal vessel  D1: Moderate caliber vessel 70% proximal vessel stenosis  D2 moderate caliber vessel 90% proximal vessel stenosis  Left circumflex: Luminal irregularities  OM1 50% tubular mid vessel stenosis  RCA: Large caliber vessel, 40% tubular mid vessel stenosis distal vessel provides right to left collaterals to LAD  PDA and PLB: Luminal irregularities  There was no indication of any PCI performed       Assessment and Plan     1. Troponin elevation:    -possible NSTEMI    -HS Troponin trending down from peak of 2136 (1474--2136--1702)   -12 LEAD ekg NSR, PVC, LVH   -no chest pain   -multiple risk factors and history of CAD   -Will proceed with nuclear stress test for risk assessment today- will review with Dr. Bucky Mata in light of [facility] records.   -Continue heparin gtt- DC this afternoon    -ASA 81 mg daily,high intensity statin.   -Starting beta blocker low dose coreg (no wheezing today)    2. CAD   -[facility] 6/20/2017:  of proximal LAD as well as additional disease as above. No indication that any intervention performed   -ASA, statin, BB    3. Acute on chronic systolic CHF   -History of HFrEF per [facility] records with EF documents as 35-40%   -NYHA IV on admission   -EF now 20-25%, multiple WMA, mild-mod mR  11/16/21    ECHO ADULT COMPLETE 11/18/2021 11/18/2021    Interpretation Summary  · LV: Estimated LVEF is 20 - 25%. Normal wall thickness. Mildly dilated left ventricle. Moderate-to-severely and segmentally reduced systolic function. Severe hypokinesis of the basal anterolateral, mid anterolateral and apical lateral wall(s).  Dyskinesis of the basal inferoseptal and mid anteroseptal wall(s). · AV: Severe aortic valve focal thickening present. Moderate aortic valve sclerosis with no evidence of reduced excursion. Aortic valve leaflet calcification present. · MV: Mitral valve thickening. Mitral valve leaflet calcification without reduced excursion. Mild to moderate mitral valve regurgitation is present. · TV: Mild tricuspid valve regurgitation is present. · LA: Mildly dilated left atrium. Signed by: Gris Lopez MD on 11/18/2021 12:08 AM       -Fluid removal per renal   -Start coreg 6.25 mg BID for now    4. History of HTN   -BP currently controlled   -on amlodipine PTA   -Starting coreg    5. Anemia:    -Hgb stable,  9.2, suspect chronic given ESRD   -monitor    5. DM type II   -Hgb A1C 7.4    6. PVD hx of bilateral BKA  7. Hx of CVA   -on ASA, statin. Also on plavix PTA (?for CVA or PVD)  8. ESRD on HD   -s/p UF yesterday 2 liters. 9. PVCs:   -K=3.5, Mg=2.3    Further plan per Dr. Clay Garzon       ____________________________________________________________      HPI:  Mr. Narayan Pate is a 78 y.o. male with PMH of HTN, DM, PAD s/p bilateral BKA Rt BKA 2017, LBKA approx 2005), ESRD on HD. He presented yesterday from [facility] due to c/o SOB, hypoxia. He reportedly was found to have O2 sats in 70's on Room air. EMS was called. O2 sats improved to low 90's on NRM. He also reports that his BG was very high. (>500). He reports that he has been experiencing SOB but uncertain when SOB started. He believes he has been SOB for few days. He also reports abdominal swelling but no BLE edema. He denies any history of chest pain. Reports cough, no fevers, chills. Denies any new fatigue. He does not ambulate, uses electric wheelchair for mobility. Initial evaluation included CXR with finidngs c/w pulmonary edema, possible superimposed pneumonia. His troponins are elevated, trending down now from peak of 2136.    Is followed by [facility] physicians for nephrology care  SH: former smoker but quit 30 years ago. Occasional ETOH. Remote history of cocaine use 30 years ago. FH: sister had CABG at age <67, [de-identified] a young age. \"    Patient Active Problem List   Diagnosis Code    Respiratory failure ([facility] J96.90     No specialty comments available. Review of Systems:    [] Patient unable to provide secondary to condition    CONSTITUTIONAL: fatigue but nothing new     Mouth: negative     EYES: chronic blindness right eye    CARDIOVASCULAR: SOB     Respiratory: SOB improved      GASTROINTESTINAL: negative, . GENITOURINARY: negative     MUSCULOSKELETAL: BLE BKA      SKIN: negative    NEUROLOGICAL: negative     PSYCHOLOGICAL: negative      HEME/LYMPH: negative    ENDOCRINE: negative        Past Medical History:   Diagnosis Date    Diabetes ([facility] [address]     Hypertension     [facility] 5/16/2003     Past Surgical History:   Procedure Laterality Date    HX ORTHOPAEDIC  7/20/2010    Bunion and toe repair left foot.  HX ORTHOPAEDIC      Plate in left hip.      Current Facility-Administered Medications   Medication Dose Route Frequency    carvediloL (COREG) tablet 6.25 mg  6.25 mg Oral BID WITH MEALS    regadenoson (LEXISCAN) injection 0.4 mg  0.4 mg IntraVENous RAD ONCE    saline peripheral flush soln 20 mL  20 mL InterCATHeter RAD ONCE    heparin 25,000 units in D5W 250 ml infusion  12-25 Units/kg/hr IntraVENous TITRATE    aspirin chewable tablet 81 mg  81 mg Oral DAILY    atorvastatin (LIPITOR) tablet 40 mg  40 mg Oral QHS    cefepime (MAXIPIME) 1 g in sterile water (preservative free) 10 mL IV syringe  1 g IntraVENous Q24H    epoetin glenny-epbx (RETACRIT) injection 10,000 Units  10,000 Units SubCUTAneous DIALYSIS TUE, THU & SAT    insulin lispro (HUMALOG) injection   SubCUTAneous AC&HS    sodium chloride (NS) flush 5-10 mL  5-10 mL IntraVENous PRN    sodium chloride (NS) flush 5-40 mL  5-40 mL IntraVENous Q8H    sodium chloride (NS) flush 5-40 mL  5-40 mL IntraVENous PRN    acetaminophen (TYLENOL) tablet 650 mg  650 mg Oral Q4H PRN    glucose chewable tablet 16 g  4 Tablet Oral PRN    dextrose (D50W) injection syrg 12.5-25 g  25-50 mL IntraVENous PRN    glucagon (GLUCAGEN) injection 1 mg  1 mg IntraMUSCular PRN       Allergies   Allergen Reactions    Adhesive Tape-Silicones Rash        FmHx: family history is not on file. Social Hx :  reports that he has never smoked. He does not have any smokeless tobacco history on file. He reports current drug use. Drug: Prescription. He reports that he does not drink alcohol. Objective:    Physical Exam    Vitals:   Vitals:    11/18/21 0800 11/18/21 0815 11/18/21 1014 11/18/21 1110   BP: 114/86 (!) 142/63  (!) 141/130   Pulse: (!) 102 96 (!) 108 (!) 136   Resp: 18 17 20   Temp:  98.2 °F (36.8 °C)  98.6 °F (37 °C)   TempSrc:  Axillary     SpO2:  95%  98%   Weight:       Height:           General:    Alert, cooperative, no distress, appears stated age. Rt eye blindness. Neck:   Supple,    Back:     Symmetric,    Lungs:     Clear to auscultation bilaterally, no wheeze. No use of accessory muscles. .   Heart[de-identified]    Regular rate and rhythm, S1, S2 normal, no murmur, click, rub or gallop. Abdomen:     Soft, non-tender. Bowel sounds normal.    Extremities:   Bilateral BKA noted.  No BLE edema   Vascular:   Pulses - 2+ left radial. RUE with fistual with + thrill   Skin:   Skin color normal. No rashes or lesions on visible areas   Neurologic:   Alert, THOMAS       Telemetry: NSR, PVCs    ECG:   EKG Results     Procedure 720 Value Units Date/Time    EKG, 12 LEAD, INITIAL [[id number]] Collected: 11/16/21 0644    Order Status: Completed Updated: 11/18/21 1056     Ventricular Rate 92 BPM      Atrial Rate 92 BPM      P-R Interval 170 ms      QRS Duration 108 ms      Q-T Interval 398 ms      QTC Calculation (Bezet) 492 ms      Calculated P Axis 15 degrees      Calculated R Axis -30 degrees      Calculated T Axis 142 degrees      Diagnosis --     Sinus rhythm with sinus arrhythmia with frequent premature ventricular   complexes  Left axis deviation  Left ventricular hypertrophy with repolarization abnormality ( [employer]   product )  Cannot rule out Septal infarct , age undetermined  Abnormal ECG  No previous ECGs available  Confirmed by Tru Cobb MD. ([id number]) on 11/18/2021 10:56:27 AM      EKG, 12 LEAD, INITIAL [[id number]]     Order Status: Sent     EKG, 12 LEAD, INITIAL [[id number]]     Order Status: Sent     EKG, 12 LEAD, INITIAL [[id number]]     Order Status: Canceled           Data Review:     Radiology:   XR Results (most recent):  Results from Hospital Encounter encounter on 11/16/21    XR CHEST PORT    Narrative  EXAM: XR CHEST PORT    INDICATION: Eval for Infiltrate    COMPARISON: None. FINDINGS: A portable AP radiograph of the chest was obtained at 0757 hours. The  patient is on a cardiac monitor. The heart size at the upper limits of normal.  There is blunting of costophrenic angles and hazy opacities each lung base  consistent with pleural effusions which are small. There is pulmonary vascular  congestion and there is predominantly central interstitial and airspace  opacities with a fairly symmetric distribution. These findings are most  consistent with pulmonary edema. Superimposed pneumonia would be difficult to  exclude. Impression  1. The above findings are most consistent with pulmonary edema. Superimposed  pneumonia would be difficult to exclude. No results for input(s): CPK, TROIQ in the last 72 hours.     No lab exists for component: CKQMB, CPKMB, BMPP  Recent Labs     11/18/21 0440 11/18/21 0236    136   K 3.6 4.0   CL 98 100   CO2 24 23   BUN 56* 59*   CREA 8.75* 8.64*   * 252*   PHOS  --  3.7   CA 9.6 9.5     Recent Labs     11/18/21 0236 11/17/21  0353   WBC 7.6 8.0   HGB 9.0* 9.1*   HCT 28.4* 27.9*    156     Recent Labs     11/18/21 0440 11/18/21 0236 11/17/21 0354 11/16/21  0648   PTP  --   --  14.0*  -- INR  --   --  1.4*  --    AP 68 68  --    < >    < > = values in this interval not displayed. No results for input(s): CHOL, LDLC in the last 72 hours. No lab exists for component: TGL, HDLC,  HBA1C  No results for input(s): CRP, TSH, TSHEXT, TSHEXT in the last 72 hours.     No lab exists for component: ESR    Sorin Tre, ANP-BC  Yarelis Jauregui MD      [facility], [address] [address]  [facility]  [phone]      CC:Faye Killian MD

## 2021-11-18 NOTE — PROGRESS NOTES
Patient name: Yani Shay  MRN: [id number]    Nephrology Progress note:    Assessment:  ESRD: TTS -> [address] (followed by [facility])  SOB: 2 to Pulm edema/PNA  Systolic CHF: EF 38%  HTN: stable  DM2  PVD: hx of b/l BKA  Hx of CVA  Anemia 2 to ESRD: Hgb below goal    Plan/Recommendations:  HD today  UF as tolerated  IV Abx  WARREN  Renally adjust new meds  Am labs        Subjective:  Seen on HD at 7:50am. VSS. SOB improving per patient. No complaints. ROS:   No nausea, no vomiting  No chest pain    Exam:  Visit Vitals  /83   Pulse (!) 107   Temp 97.8 °F (36.6 °C)   Resp 16   Ht 6' 3\" (1.905 m)   Wt 79.6 kg (175 lb 7.8 oz)   SpO2 96%   BMI 21.93 kg/m²     Wt Readings from Last 3 Encounters:   11/17/21 79.6 kg (175 lb 7.8 oz)   08/07/10 98 kg (216 lb)     No intake or output data in the 24 hours ending 11/18/21 0853    Gen: NAD  HEENT:  AT/NC  Lungs/Chest wall: Clear. No accessory muscle use. Cardiovascular: Regular rate, normal rhythm. Abdomen/: Soft, NT, ND, BS+. Ext: B/L BKA. No peripheral edema  CNS: alert and awake.  Answers appropriately      Current Facility-Administered Medications   Medication Dose Route Frequency Last Admin    heparin 25,000 units in D5W 250 ml infusion  12-25 Units/kg/hr IntraVENous TITRATE 13 Units/kg/hr at 11/18/21 0730    aspirin chewable tablet 81 mg  81 mg Oral DAILY 81 mg at 11/17/21 1258    atorvastatin (LIPITOR) tablet 40 mg  40 mg Oral QHS 40 mg at 11/17/21 1066    cefepime (MAXIPIME) 1 g in sterile water (preservative free) 10 mL IV syringe  1 g IntraVENous Q24H 1 g at 11/17/21 1732    epoetin glenny-epbx (RETACRIT) injection 10,000 Units  10,000 Units SubCUTAneous DIALYSIS TUE, THU & SAT      insulin lispro (HUMALOG) injection   SubCUTAneous AC&HS 1 Units at 11/17/21 2257    sodium chloride (NS) flush 5-10 mL  5-10 mL IntraVENous PRN      sodium chloride (NS) flush 5-40 mL  5-40 mL IntraVENous Q8H 10 mL at 11/17/21 2259    sodium chloride (NS) flush 5-40 mL  5-40 mL IntraVENous PRN      acetaminophen (TYLENOL) tablet 650 mg  650 mg Oral Q4H PRN      glucose chewable tablet 16 g  4 Tablet Oral PRN      dextrose (D50W) injection syrg 12.5-25 g  25-50 mL IntraVENous PRN      glucagon (GLUCAGEN) injection 1 mg  1 mg IntraMUSCular PRN         Labs/Data:    Lab Results   Component Value Date/Time    WBC 7.6 11/18/2021 02:36 AM    HGB 9.0 (L) 11/18/2021 02:36 AM    HCT 28.4 (L) 11/18/2021 02:36 AM    PLATELET 217 41/70/5204 02:36 AM    MCV 93.4 11/18/2021 02:36 AM       Lab Results   Component Value Date/Time    Sodium 136 11/18/2021 04:40 AM    Potassium 3.6 11/18/2021 04:40 AM    Chloride 98 11/18/2021 04:40 AM    CO2 24 11/18/2021 04:40 AM    Anion gap 14 11/18/2021 04:40 AM    Glucose 273 (H) 11/18/2021 04:40 AM    BUN 56 (H) 11/18/2021 04:40 AM    Creatinine 8.75 (H) 11/18/2021 04:40 AM    BUN/Creatinine ratio 6 (L) 11/18/2021 04:40 AM    GFR est AA 7 (L) 11/18/2021 04:40 AM    GFR est non-AA 6 (L) 11/18/2021 04:40 AM    Calcium 9.6 11/18/2021 04:40 AM       Patient seen and examined. Chart reviewed. Labs, data and other pertinent notes reviewed in last 24 hrs.     Discussed with patient and HD RN    Signed by:  Alverto Castillo MD  [id number]

## 2021-11-18 NOTE — PROGRESS NOTES
Cath from [facility]  6/20/17 for abnormal stress test  LM ok  LAD  prox with benny from RCA and distal LAD occluded  D1 prox 70  D2 prox 90  Circ pMLI  OM1 50%   RCA large, mid 40

## 2021-11-18 NOTE — PROGRESS NOTES
Bedside shift change report given to Charles Weems (oncoming nurse) by Yoana Maradiaga RN (offgoing nurse). Report included the following information SBAR, Kardex, ED Summary, Intake/Output, MAR and Cardiac Rhythm NSR.

## 2021-11-19 NOTE — NURSE NAVIGATOR
Chart reviewed by Heart Failure Nurse Navigator. Heart Failure database completed. EF:  15% nuclear scan    ACEi/ARB/ARNi: Please document contraindication or specific reason no ACE/ARB/ARNi prescribed. BB: Coreg    Aldosterone Antagonist: Please consider a guideline directed Aldosterone Receptor Antagonist (Spironolactone/Eplerenone) for patients with an EF of 35% or less and a NYHA functional class of II-IV unless contraindicated. Contraindications include allergy due to aldosterone receptor antagonist, hyperkalemia,(potassium above 5.0 mEq/L ) and renal dysfunction. (creatinine >2.5 mg/dL in men or >2.0 mg/dL in women (or estimated glomerular filtration rate < 30 ml/min/1.732m2)      Obstructive Sleep Apnea Screening: N/4   STOP-BANG score:   Referred to Sleep Medicine:     CRT      NYHA Functional Class IV      Heart Failure Teach Back in Patient Education. Heart Failure Avoiding Triggers on Discharge Instructions. Cardiologist: [facility]      Post discharge follow up phone call to be made within 48-72 hours of discharge.

## 2021-11-19 NOTE — PROGRESS NOTES
Verbal bedside report given to Margarito Morrison RN oncoming nurse by Luis Carlos Perez. Audra French RN off-going nurse. Report included current pt status and condition, recent results, hx of present illness, heart rate and rhythm, and respiratory status. 1330  Weaned to room air, pt tolerating well sats remain 97%. 1100  Weaned O2 to 2L NC tolerating well, sats remain 98%.

## 2021-11-19 NOTE — PROGRESS NOTES
Bedside shift change report given to Deborah (oncoming nurse) by Chapo Hernandez (offgoing nurse). Report included the following information SBAR, Kardex, ED Summary, MAR, Recent Results and Cardiac Rhythm NSR. Problem: Diabetes Self-Management  Goal: *Monitoring blood glucose, interpreting and using results  Description: Identify recommended blood glucose targets  and personal targets. 11/19/2021 0406 by Alexa Zimmerman  Outcome: Progressing Towards Goal  11/19/2021 0144 by Alexa Zimmerman  Outcome: Progressing Towards Goal     Problem: Falls - Risk of  Goal: *Absence of Falls  Description: Document Tom Olvera Fall Risk and appropriate interventions in the flowsheet. Outcome: Progressing Towards Goal  Note: Fall Risk Interventions:  Mobility Interventions: Communicate number of staff needed for ambulation/transfer, Patient to call before getting OOB, PT Consult for mobility concerns, PT Consult for assist device competence         Medication Interventions: Teach patient to arise slowly, Patient to call before getting OOB, Evaluate medications/consider consulting pharmacy    Elimination Interventions: Call light in reach, Patient to call for help with toileting needs, Toileting schedule/hourly rounds              Problem: Pressure Injury - Risk of  Goal: *Prevention of pressure injury  Description: Document Laureano Scale and appropriate interventions in the flowsheet.   11/19/2021 0406 by Alexa Zimmerman  Outcome: Progressing Towards Goal  Note: Pressure Injury Interventions:  Sensory Interventions: Assess need for specialty bed, Check visual cues for pain, Minimize linen layers    Moisture Interventions: Apply protective barrier, creams and emollients, Absorbent underpads    Activity Interventions: Pressure redistribution bed/mattress(bed type), Increase time out of bed    Mobility Interventions: Pressure redistribution bed/mattress (bed type)    Nutrition Interventions: Document food/fluid/supplement intake    Friction and Shear Interventions: Apply protective barrier, creams and emollients, Minimize layers             11/19/2021 0144 by Shaggy Melara  Outcome: Progressing Towards Goal  Note: Pressure Injury Interventions:  Sensory Interventions: Assess need for specialty bed, Check visual cues for pain, Minimize linen layers    Moisture Interventions: Apply protective barrier, creams and emollients, Absorbent underpads    Activity Interventions: Pressure redistribution bed/mattress(bed type), Increase time out of bed    Mobility Interventions: Pressure redistribution bed/mattress (bed type)    Nutrition Interventions: Document food/fluid/supplement intake    Friction and Shear Interventions: Apply protective barrier, creams and emollients, Minimize layers

## 2021-11-19 NOTE — PROGRESS NOTES
Problem: Diabetes Self-Management  Goal: *Monitoring blood glucose, interpreting and using results  Description: Identify recommended blood glucose targets  and personal targets. Outcome: Progressing Towards Goal     Problem: Pressure Injury - Risk of  Goal: *Prevention of pressure injury  Description: Document Laureano Scale and appropriate interventions in the flowsheet.   Outcome: Progressing Towards Goal  Note: Pressure Injury Interventions:  Sensory Interventions: Assess need for specialty bed, Check visual cues for pain, Minimize linen layers    Moisture Interventions: Apply protective barrier, creams and emollients, Absorbent underpads    Activity Interventions: Pressure redistribution bed/mattress(bed type), Increase time out of bed    Mobility Interventions: Pressure redistribution bed/mattress (bed type)    Nutrition Interventions: Document food/fluid/supplement intake    Friction and Shear Interventions: Apply protective barrier, creams and emollients, Minimize layers

## 2021-11-19 NOTE — CONSULTS
Consult received and chart reviewed. Pt is already being seen and treated for his heart failure by Dr. Clay Garzon.  Our team does not have any additional recommendations to contribute, so will defer the consult.

## 2021-11-19 NOTE — PROGRESS NOTES
Patient name: Kayla Almodovar  MRN: [id number]    Nephrology Progress note:    Assessment:  ESRD: TTS -> [address] (followed by [facility])  SOB: 2 to Pulm edema/PNA  Systolic CHF: EF 98-29%  HTN: stable  DM2  PVD: hx of b/l BKA  Hx of CVA  Anemia 2 to ESRD: Hgb below goal    Plan/Recommendations:  HD tomorrow-. 4K bath  UF as tolerated  IV Abx  WARREN  Cardiology following. Adjusting meds  Renally adjust new meds  Am labs    Will see again on Monday. Call us if needed over the weekend        Subjective:  Resting this morning when I saw him. No complaints. ROS:   No nausea, no vomiting  No chest pain    Exam:  Visit Vitals  BP (!) 134/90 (BP 1 Location: Left upper arm, BP Patient Position: At rest;Lying;Lying left side)   Pulse 83   Temp 98.7 °F (37.1 °C)   Resp 23   Ht 6' 3\" (1.905 m)   Wt 79.7 kg (175 lb 12.8 oz)   SpO2 95%   BMI 21.97 kg/m²     Wt Readings from Last 3 Encounters:   11/18/21 79.7 kg (175 lb 12.8 oz)   08/07/10 98 kg (216 lb)       Intake/Output Summary (Last 24 hours) at 11/19/2021 1721  Last data filed at 11/19/2021 1200  Gross per 24 hour   Intake 0 ml   Output 0 ml   Net 0 ml       Gen: NAD  HEENT:  AT/NC  Lungs/Chest wall: Clear. No accessory muscle use. Cardiovascular: Regular rate, normal rhythm. Abdomen/: Soft, NT, ND, BS+. Ext: B/L BKA.  No peripheral edema        Current Facility-Administered Medications   Medication Dose Route Frequency Last Admin    hydrALAZINE (APRESOLINE) tablet 25 mg  25 mg Oral TID 25 mg at 11/19/21 1618    isosorbide dinitrate (ISORDIL) tablet 10 mg  10 mg Oral TID 10 mg at 11/19/21 1618    cefepime (MAXIPIME) 1 g in 0.9% sodium chloride 10 mL IV syringe  1 g IntraVENous Q24H      carvediloL (COREG) tablet 6.25 mg  6.25 mg Oral BID WITH MEALS 6.25 mg at 11/19/21 1618    aspirin chewable tablet 81 mg  81 mg Oral DAILY 81 mg at 11/19/21 1052    atorvastatin (LIPITOR) tablet 40 mg  40 mg Oral QHS 40 mg at 11/18/21 2305    epoetin glenny-epbx (RETACRIT) injection 10,000 Units  10,000 Units SubCUTAneous DIALYSIS TUE, THU & SAT 10,000 Units at 11/18/21 2309    insulin lispro (HUMALOG) injection   SubCUTAneous AC&HS 2 Units at 11/19/21 1329    sodium chloride (NS) flush 5-10 mL  5-10 mL IntraVENous PRN      sodium chloride (NS) flush 5-40 mL  5-40 mL IntraVENous Q8H 10 mL at 11/18/21 1756    sodium chloride (NS) flush 5-40 mL  5-40 mL IntraVENous PRN      acetaminophen (TYLENOL) tablet 650 mg  650 mg Oral Q4H PRN      glucose chewable tablet 16 g  4 Tablet Oral PRN      dextrose (D50W) injection syrg 12.5-25 g  25-50 mL IntraVENous PRN      glucagon (GLUCAGEN) injection 1 mg  1 mg IntraMUSCular PRN         Labs/Data:    Lab Results   Component Value Date/Time    WBC 6.9 11/19/2021 04:53 AM    HGB 8.4 (L) 11/19/2021 04:53 AM    HCT 26.1 (L) 11/19/2021 04:53 AM    PLATELET 997 60/07/0665 04:53 AM    MCV 91.9 11/19/2021 04:53 AM       Lab Results   Component Value Date/Time    Sodium 135 (L) 11/19/2021 04:53 AM    Potassium 3.5 11/19/2021 04:53 AM    Chloride 97 11/19/2021 04:53 AM    CO2 24 11/19/2021 04:53 AM    Anion gap 14 11/19/2021 04:53 AM    Glucose 208 (H) 11/19/2021 04:53 AM    BUN 42 (H) 11/19/2021 04:53 AM    Creatinine 6.69 (H) 11/19/2021 04:53 AM    BUN/Creatinine ratio 6 (L) 11/19/2021 04:53 AM    GFR est AA 10 (L) 11/19/2021 04:53 AM    GFR est non-AA 8 (L) 11/19/2021 04:53 AM    Calcium 9.5 11/19/2021 04:53 AM       Patient seen and examined. Chart reviewed.  Labs, data and other pertinent notes reviewed in last 24 hrs.     Discussed with patient    Signed by:  Larissa Mejía MD  [id number]

## 2021-11-19 NOTE — ADT AUTH CERT NOTES
Jayesh Calzada,     I received the following note from [facility], RN  Raheem OCONNOR  Patient was on Heparin drip from : starrted 11/17/21 0400 to 11/18/21  1400. I called the nurse and the unit. She reported patient was on 4L today when she came on and she has weaned patient to 2L at 1100.  Patient had been on 4L .    Thanks,   Maylina-SCI,

## 2021-11-19 NOTE — PROGRESS NOTES
[facility]      Cardiology Care Note                                    Initial visit      Patient Name: Martell Marie - [id number] - UKR:[id number]  Primary Cardiologist: none ? [facility]  Consulting Cardiologist: Rafael Ramirez MD  Reason for Consult: NSTEMI         Assessment/Plan/Discussion:Cardiology Attending:     Patient seen on the day of progress note and examined  reviewed  with Advance Practice Provider (CHARLA, NP,PA)     EF 20-25%  Cath prev with LAD    Stress test shows a large area of infarction in the known LAD  area. There is no significant area of ischemia seen. He had an NSTEMI with disease and shortness of breath. This would negate that medical management is appropriate going forward with continuing aspirin and Coreg. He has not been compliant with follow-up but is encouraged to follow back up at [address] [place] [address] and should probably see the heart failure team since his ejection fraction is reduced. We will add hydralazine and Isordil since he is not able to take an ACE or Entresto due to renal function. If after 3 months of optimal medical therapy he remains low ejection fraction he should have a defibrillator considered but I am concerned with his lack of follow-up that that may not be appropriate. I have told him to be more compliant with follow-up with his usual cardiologist.  He understands. We will see him back on a as needed basis. Fu [facility] cards, NN to refer to CHF clinic Dr Rc Goldsmith there  Rafael Ramirez MD        Subjective:   78 y.o. male who presents with chief c/o hypoxia and SOB, no chest pain. Reports he had been SOB for few days. He currently denies SOB. No chest pain. He has ruled in for NSTEMI. Underwent nuclear stress test yesterday which shows large anterior infarct as expected based on cath 2017. and no reversible ischemia. Assessment and Plan     1.  Troponin elevation/NSTEMI    No chest pain    -HS Troponin trending down from peak of 2136 (1474--2136--1702)   -12 LEAD ekg NSR, PVC, LVH   -Noted larged infarction on Nuclear stress yesterday as expected from [facility] in 2017 but no reversible ischemia. No further cardiac testing needed. Medical management of CAD   -ASA 81 mg daily,high intensity statin.   -Continue low dose coreg. 2. CAD   -[facility] 6/20/2017:  of proximal LAD as well as additional disease as above. No indication that any intervention performed   -ASA, statin, BB    3. Acute on chronic systolic CHF   -History of HFrEF per [facility] records with EF documents as 35-40%   -NYHA IV on admission   -EF now 20-25%, multiple WMA, mild-mod mR  11/16/21    ECHO ADULT COMPLETE 11/18/2021 11/18/2021    Interpretation Summary  · LV: Estimated LVEF is 20 - 25%. Normal wall thickness. Mildly dilated left ventricle. Moderate-to-severely and segmentally reduced systolic function. Severe hypokinesis of the basal anterolateral, mid anterolateral and apical lateral wall(s). Dyskinesis of the basal inferoseptal and mid anteroseptal wall(s). · AV: Severe aortic valve focal thickening present. Moderate aortic valve sclerosis with no evidence of reduced excursion. Aortic valve leaflet calcification present. · MV: Mitral valve thickening. Mitral valve leaflet calcification without reduced excursion. Mild to moderate mitral valve regurgitation is present. · TV: Mild tricuspid valve regurgitation is present. · LA: Mildly dilated left atrium. Signed by: Ashok Man MD on 11/18/2021 12:08 AM       -Fluid removal per renal   -No lifevest needed at this time. Follow up outpt at [address] 6.25 mg BID    -Add hydralazine and isordil    -Needs follow up with Advanced heart failure at [facility]- will ask HF nurse navigator toschedule. 4. History of HTN   -BP currently controlled   -On amlodipine PTA but adding hydralazine and isordil now for #3   -coreg    5.  Anemia:    -Hgb slight trend down 8.4, suspect chronic given ESRD   -monitor    5. DM type II   -Hgb A1C 7.4    6. PVD hx of bilateral BKA  7. Hx of CVA   -on ASA, statin. Also on plavix PTA (?for CVA or PVD)- defer to primary team,  8. ESRD on HD  9. PVCs:   -improved    No reversible ischemia on Nuclear stress test. Anterior infarct noted on stress test yesterday but as expected given [facility] findings in 2017. No further cardiac testing. Will add hydralazine and isosorbide. Recommend he follow up with AHF at [facility] after discharge.        ____________________________________________________________    Cardiac testing hx:  11/16/21    ECHO ADULT COMPLETE 11/18/2021 11/18/2021    Interpretation Summary  · LV: Estimated LVEF is 20 - 25%. Normal wall thickness. Mildly dilated left ventricle. Moderate-to-severely and segmentally reduced systolic function. Severe hypokinesis of the basal anterolateral, mid anterolateral and apical lateral wall(s). Dyskinesis of the basal inferoseptal and mid anteroseptal wall(s). · AV: Severe aortic valve focal thickening present. Moderate aortic valve sclerosis with no evidence of reduced excursion. Aortic valve leaflet calcification present. · MV: Mitral valve thickening. Mitral valve leaflet calcification without reduced excursion. Mild to moderate mitral valve regurgitation is present. · TV: Mild tricuspid valve regurgitation is present. · LA: Mildly dilated left atrium. Signed by: Adriana Pate MD on 11/18/2021 12:08 AM          Records obtained from [facility]:  Chronic HFrEF (ef 35-40%). Discharged from [facility] on 11/2/2021 after hospitalization for nausea and vomiting and reatment of small bowel obstruction.     He had left heart catheterization and 6/20/2017 by Dr. Fausto Loza with the following findings:      Patient Active Problem List   Diagnosis Code    Respiratory failure ([facility] [address] J96.90    Anemia in chronic kidney disease N18.9, D63.1    Diabetes mellitus due to underlying condition with diabetic nephropathy ([address] E08.21    End stage renal disease ([facility] [address] N18.6    Coronary artery disease involving native coronary artery of native heart without angina pectoris I25.10    NSTEMI (non-ST elevated myocardial infarction) ([facility] [address] U20.7    Systolic CHF, acute on chronic (HCC) I50.23     Cath from [facility]  6/20/17 for abnormal stress test  LM ok  LAD  prox with benny from RCA and distal LAD occluded  D1 prox 70  D2 prox 90  Circ pMLI  OM1 50%   RCA large, mid 40      Review of Systems:    [] Patient unable to provide secondary to condition    CONSTITUTIONAL: fatigue but nothing new     Mouth: negative     EYES: chronic blindness right eye    CARDIOVASCULAR: SOB     Respiratory: SOB improved      GASTROINTESTINAL: negative, . GENITOURINARY: negative     MUSCULOSKELETAL: BLE BKA      SKIN: negative    NEUROLOGICAL: negative     PSYCHOLOGICAL: negative      HEME/LYMPH: negative    ENDOCRINE: negative        Past Medical History:   Diagnosis Date    Coronary artery disease involving native coronary artery of native heart without angina pectoris 11/18/2021    Cath from [facility] 6/20/17 for abnormal stress test LM ok LAD  prox with benny from RCA and distal LAD occluded D1 prox 70 D2 prox 90 Circ pMLI OM1 50%  RCA large, mid 40    Diabetes ([facility] [address]     Hypertension     [facility]     Past Surgical History:   Procedure Laterality Date    HX ORTHOPAEDIC  7/20/2010    Bunion and toe repair left foot.  HX ORTHOPAEDIC      Plate in left hip.      Current Facility-Administered Medications   Medication Dose Route Frequency    carvediloL (COREG) tablet 6.25 mg  6.25 mg Oral BID WITH MEALS    aspirin chewable tablet 81 mg  81 mg Oral DAILY    atorvastatin (LIPITOR) tablet 40 mg  40 mg Oral QHS    cefepime (MAXIPIME) 1 g in sterile water (preservative free) 10 mL IV syringe  1 g IntraVENous Q24H    epoetin glenny-epbx (RETACRIT) injection 10,000 Units  10,000 Units SubCUTAneous DIALYSIS TUE, THU & SAT    insulin lispro (HUMALOG) injection   SubCUTAneous AC&HS    sodium chloride (NS) flush 5-10 mL  5-10 mL IntraVENous PRN    sodium chloride (NS) flush 5-40 mL  5-40 mL IntraVENous Q8H    sodium chloride (NS) flush 5-40 mL  5-40 mL IntraVENous PRN    acetaminophen (TYLENOL) tablet 650 mg  650 mg Oral Q4H PRN    glucose chewable tablet 16 g  4 Tablet Oral PRN    dextrose (D50W) injection syrg 12.5-25 g  25-50 mL IntraVENous PRN    glucagon (GLUCAGEN) injection 1 mg  1 mg IntraMUSCular PRN       Allergies   Allergen Reactions    Adhesive Tape-Silicones Rash        FmHx: family history is not on file. Social Hx :  reports that he has never smoked. He does not have any smokeless tobacco history on file. He reports current drug use. Drug: Prescription. He reports that he does not drink alcohol. Objective:    Physical Exam    Vitals:   Vitals:    11/19/21 1008 11/19/21 1058 11/19/21 1101 11/19/21 1202   BP:   (!) 142/81    Pulse: 99  94 88   Resp:   22    Temp:   98.3 °F (36.8 °C)    TempSrc:       SpO2:  100% 100%    Weight:       Height:           General:    Alert, cooperative, no distress, appears stated age. Rt eye blindness. Neck:   Supple,    Back:     Symmetric,    Lungs:     Clear to auscultation bilaterally, no wheeze. No use of accessory muscles. .   Heart[de-identified]    Regular rate and rhythm, S1, S2 normal, no murmur, click, rub or gallop. Abdomen:     Soft, non-tender. Bowel sounds normal.    Extremities:   Bilateral BKA noted.  No BLE edema   Vascular:   Pulses - 2+ left radial. RUE with fistual with + thrill   Skin:   Skin color normal. No rashes or lesions on visible areas   Neurologic:   Alert, THOMAS       Telemetry: NSR, PVCs    ECG:   EKG Results     Procedure 720 Value Units Date/Time    EKG, 12 LEAD, INITIAL [[id number]] Collected: 11/16/21 0644    Order Status: Completed Updated: 11/18/21 1056     Ventricular Rate 92 BPM      Atrial Rate 92 BPM      P-R Interval 170 ms      QRS Duration 108 ms      Q-T Interval 398 ms QTC Calculation (Bezet) 492 ms      Calculated P Axis 15 degrees      Calculated R Axis -30 degrees      Calculated T Axis 142 degrees      Diagnosis --     Sinus rhythm with sinus arrhythmia with frequent premature ventricular   complexes  Left axis deviation  Left ventricular hypertrophy with repolarization abnormality ( [place]   product )  Cannot rule out Septal infarct , age undetermined  Abnormal ECG  No previous ECGs available  Confirmed by Sandrita Munguia MD. ([id number]) on 11/18/2021 10:56:27 AM      EKG, 12 LEAD, INITIAL [[id number]]     Order Status: Sent     EKG, 12 LEAD, INITIAL [[id number]]     Order Status: Sent     EKG, 12 LEAD, INITIAL [[id number]]     Order Status: Canceled           Data Review:     Radiology:   XR Results (most recent):  Results from Hospital Encounter encounter on 11/16/21    XR CHEST PORT    Narrative  EXAM: XR CHEST PORT    INDICATION: Eval for Infiltrate    COMPARISON: None. FINDINGS: A portable AP radiograph of the chest was obtained at 0757 hours. The  patient is on a cardiac monitor. The heart size at the upper limits of normal.  There is blunting of costophrenic angles and hazy opacities each lung base  consistent with pleural effusions which are small. There is pulmonary vascular  congestion and there is predominantly central interstitial and airspace  opacities with a fairly symmetric distribution. These findings are most  consistent with pulmonary edema. Superimposed pneumonia would be difficult to  exclude. Impression  1. The above findings are most consistent with pulmonary edema. Superimposed  pneumonia would be difficult to exclude. No results for input(s): CPK, TROIQ in the last 72 hours.     No lab exists for component: CKQMB, CPKMB, BMPP  Recent Labs     11/19/21  0453 11/18/21  0440 11/18/21  0236 11/18/21  0236   * 136   < > 136   K 3.5 3.6   < > 4.0   CL 97 98   < > 100   CO2 24 24   < > 23   BUN 42* 56*   < > 59*   CREA 6.69* 8.75*   < > 8.64*   * 273*   < > 252*   PHOS 3.6  --   --  3.7   CA 9.5 9.6   < > 9.5    < > = values in this interval not displayed. Recent Labs     11/19/21 0453 11/18/21 0236   WBC 6.9 7.6   HGB 8.4* 9.0*   HCT 26.1* 28.4*    165     Recent Labs     11/19/21 0453 11/18/21  0440 11/18/21 0236 11/17/21  0354   PTP  --   --   --  14.0*   INR  --   --   --  1.4*   AP 64 68   < >  --     < > = values in this interval not displayed. No results for input(s): CHOL, LDLC in the last 72 hours. No lab exists for component: TGL, HDLC,  HBA1C  No results for input(s): CRP, TSH, TSHEXT, TSHEXT in the last 72 hours.     No lab exists for component: ESR    Willma Severo, [facility]  Joycelyn Suarez MD      [facility] [address] [facility] [address]  [facility], [facility]  [phone]      CC:Gregory Killian MD

## 2021-11-20 NOTE — PROGRESS NOTES
Bedside shift change report given to Tru Espinoza (oncoming nurse) by Anam Markham (offgoing nurse). Report included the following information SBAR, Kardex, ED Summary, MAR, Recent Results and Cardiac Rhythm NSR. Problem: Diabetes Self-Management  Goal: *Monitoring blood glucose, interpreting and using results  Description: Identify recommended blood glucose targets  and personal targets. Outcome: Progressing Towards Goal     Problem: Falls - Risk of  Goal: *Absence of Falls  Description: Document Zee Louise Fall Risk and appropriate interventions in the flowsheet. Outcome: Progressing Towards Goal  Note: Fall Risk Interventions:  Mobility Interventions: Communicate number of staff needed for ambulation/transfer         Medication Interventions: Patient to call before getting OOB    Elimination Interventions: Patient to call for help with toileting needs              Problem: Pressure Injury - Risk of  Goal: *Prevention of pressure injury  Description: Document Laureano Scale and appropriate interventions in the flowsheet.   Outcome: Progressing Towards Goal  Note: Pressure Injury Interventions:  Sensory Interventions: Assess need for specialty bed    Moisture Interventions: Apply protective barrier, creams and emollients, Absorbent underpads    Activity Interventions: Pressure redistribution bed/mattress(bed type)    Mobility Interventions: Pressure redistribution bed/mattress (bed type)    Nutrition Interventions:  (NPO)    Friction and Shear Interventions: Minimize layers

## 2021-11-20 NOTE — DIALYSIS
Hemodialysis / [phone]    Vitals Pre Post Assessment Pre Post   BP BP: 127/77 (11/20/21 1000) 119/75 LOC axox4 No change   HR Pulse (Heart Rate): 83 (11/20/21 1000) 83 Lungs clear no change   Resp Resp Rate: 22 (11/20/21 0740) 18 Cardiac NSR No change   Temp Temp: 97.4 °F (36.3 °C) (11/20/21 1000) 98.1 Skin wdi wdi   Weight Pre-Dialysis Weight: 78.9 kg (173 lb 15.1 oz) (11/18/21 0510)  Edema Lower extremity Minimal change   Tele status remote remote Pain Pain Intensity 1: 0 (11/20/21 0452) 0        Orders   Duration: Start: 9140 End: 1315 Total: 3   Dialyzer: Dialyzer/Set Up Inspection: Rajesh Aguero ([id number]) (11/20/21 1000)   K Bath: Dialysate K (mEq/L): 4 (11/20/21 1000)   Ca Bath: Dialysate CA (mEq/L): 2.5 (11/20/21 1000)   Na: Dialysate NA (mEq/L): 140 (11/20/21 1000)   Bicarb: Dialysate HCO3 (mEq/L): 35 (11/20/21 1000)   Target Fluid Removal: Goal/Amount of Fluid to Remove (mL): 2500 mL (11/20/21 1000)     Access   Type & Location:  RLA AVF: skin CDI. No s/s of infection. + B/T. No issues with cannulation or hemostasis. Running well at .         Comments:                                        Labs   HBsAg (Antigen) / date:            11/16/21 neg                                   HBsAb (Antibody) / date: 11/16/21 immune   Source:    Obtained/Reviewed  Critical Results Called HGB   Date Value Ref Range Status   11/20/2021 8.3 (L) 12.1 - 17.0 g/dL Final     Potassium   Date Value Ref Range Status   11/20/2021 3.4 (L) 3.5 - 5.1 mmol/L Final     Calcium   Date Value Ref Range Status   11/20/2021 9.5 8.5 - 10.1 MG/DL Final     BUN   Date Value Ref Range Status   11/20/2021 54 (H) 6 - 20 MG/DL Final     Creatinine   Date Value Ref Range Status   11/20/2021 9.07 (H) 0.70 - 1.30 MG/DL Final     Comment:     INVESTIGATED PER DELTA CHECK PROTOCOL        Meds Given   Name Dose Route                    Adequacy / Fluid    Total Liters Process: 67.3   Net Fluid Removed: 2500 ml      Comments   Time Out Done: (Time) Yes 08066   Admitting Diagnosis: Infection   Consent obtained/signed: Informed Consent Verified: Yes (11/20/21 1000)   Machine / RO # Machine Number: [id number] (11/20/21 1000)   Primary Nurse Rpt Pre: Sima Norton RN   Primary Nurse Rpt Post: Sima Norton RN   Pt Education: Tx time/options   Care Plan: Follow MD POC   Pts outpatient clinic:      Tx Summary  Pt A&Ox4. Consent signed & on file. SBAR received from Primary RN.  5135: Pt cannulated with 88W needles per policy & without issue. 1015  VSS. Dialysis Tx initiated. 1100  Pt resting quietly. 1315 Tx ended. VSS. All possible blood returned to patient. Hemostasis achieved without issue. Bed locked and in the lowest position, call bell and belongings in reach. SBAR given to Primary, RN. Patient is stable at time of my departure. All Dialysis related medications have been reviewed.       Comments:

## 2021-11-20 NOTE — PROGRESS NOTES
[address] [facility] Adult  Hospitalist Group                                                                                          Hospitalist Progress Note  Jan Wilson MD  Answering service: [phone] from in house phone        Date of Service:  2021  NAME:  Acosta Cortes  :  1955  MRN:  [id number]      Admission Summary:   \"Delbert Bazan is a 78 y.o. male who presents with shortness of breath. Patient lives at [facility], comes to the hospital today with hypoxia, patient was diagnosed with pneumonia 3 days back, has history of ESRD and is on dialysis  and Saturday. Patient reports that since last 3 days his breathing is getting worse, he is not able to breathe, has cough, woke up this morning and was quite short of breath, patient is a poor historian, reports that he was recently tested for Covid and it was negative, patient was found to have O2 level in the 70s when EMS arrived, he was placed on a nonrebreather, was brought to the hospital and placed on 4 L of oxygen, continues to be short of breath and reports that he finds it hard to breathe. Patient was requested to be admitted to the hospital service. Patient denies any other complaints or problems. Interval history / Subjective:   No acute events overnight. Patient reporting mild improvement of respiratory symptoms,  Cardiology adjusting meds  Advance diet     Assessment & Plan:     NSTEMI:  EKG no ischemic changes. Troponins trending down now 1474, 2136, 1700 respectively  2D echo 2021 + for LVEF 20 to 25%. Severe hypokinesis. Medical management- cardiology adjusting meds    Acute systolic heart failure:   No history of CAD, presenting with shortness of breath with elevated troponins. CXR positive for pulmonary edema, elevated troponins, 2D echo severely reduced ejection fraction. Continue beta-blockers, ACE. Strict in and out, cardiac diet, daily weight, scheduled hemodialysis. diet.  Cardiology on board, recommendations noted. Acute hypoxic respiratory failure: due to pneumonia and pulm edema   Improved, no leukocytosis, afebrile, SPO2 WNL on RA. Multifactorial likely due to acute bacterial pneumonia in the setting of acute CHF, NSTEMI complicated by end-stage renal disease. Continue broad-spectrum IV antibiotics. Continue supplemental oxygen, flutter valve, incentive spirometry, as needed DuoNeb's. Follow-up cultures. Diabetes mellitus type 2:  Basal, prandial and sliding scale insulin. Accu-Cheks, hypoglycemic protocol. Adjust meds as needed, outpatient PCP follow-up. ESRD on HD  On hemodialysis TTS. Continue hemodialysis diet. Nephrology on board. Dialysis as per nephrology service. Anemia  No sign of acute bleeding, likely anemia of CKD. Hemoglobin not at goal. Patient may need Procrit during hemodialysis.     Code status: Full  DVT prophylaxis: Lovenox    Care Plan discussed with: Patient/Family  Anticipated Disposition: Home w/Family  Anticipated Discharge: 24 hours to 48 hours     Hospital Problems  Never Reviewed          Codes Class Noted POA    Coronary artery disease involving native coronary artery of native heart without angina pectoris (Chronic) ICD-10-CM: I25.10  ICD-9-CM: 414.01  11/18/2021 Yes    Overview Signed 11/18/2021 12:29 PM by Dalila Galindo MD     Cath from [facility]  6/20/17 for abnormal stress test  LM ok  LAD  prox with benny from RCA and distal LAD occluded  D1 prox 70  D2 prox 90  Circ pMLI  OM1 50%   RCA large, mid 40             * (Principal) NSTEMI (non-ST elevated myocardial infarction) ([facility] [address] ICD-10-CM: I21.4  ICD-9-CM: 410.70  11/18/2021 Yes    Overview Signed 11/18/2021 12:30 PM by Dalila Galindo MD     Peak troponin 2136 11/17/21  Hx of LAD  in 2017  EF 11/17/21 25%             Systolic CHF, acute on chronic [facility]) ICD-10-CM: I50.23  ICD-9-CM: 428.23, 428.0  11/18/2021 Yes    Overview Signed 11/18/2021 12:31 PM by Stephan Chandler Margarita Victoria MD     11/16/21    ECHO ADULT COMPLETE 11/18/2021 11/18/2021    Interpretation Summary  · LV: Estimated LVEF is 20 - 25%. Normal wall thickness. Mildly dilated left ventricle. Moderate-to-severely and segmentally reduced systolic function. Severe hypokinesis of the basal anterolateral, mid anterolateral and apical lateral wall(s). Dyskinesis of the basal inferoseptal and mid anteroseptal wall(s). · AV: Severe aortic valve focal thickening present. Moderate aortic valve sclerosis with no evidence of reduced excursion. Aortic valve leaflet calcification present. · MV: Mitral valve thickening. Mitral valve leaflet calcification without reduced excursion. Mild to moderate mitral valve regurgitation is present. · TV: Mild tricuspid valve regurgitation is present. · LA: Mildly dilated left atrium. Signed by: Deborah Steele MD on 11/18/2021 12:08 AM                Respiratory failure ([address] ICD-10-CM: J96.90  ICD-9-CM: 518.81  11/16/2021 Yes        Anemia in chronic kidney disease ICD-10-CM: N18.9, D63.1  ICD-9-CM: 285.21  5/27/2019 Yes        End stage renal disease [facility]) ICD-10-CM: N18.6  ICD-9-CM: 585.6  5/27/2019 Yes                Review of Systems:   A comprehensive review of systems was negative except for that written in the HPI. Vital Signs:    Last 24hrs VS reviewed since prior progress note. Most recent are:  Visit Vitals  /67   Pulse 86   Temp 98.1 °F (36.7 °C)   Resp 22   Ht 6' 3\" (1.905 m)   Wt 79.7 kg (175 lb 12.8 oz)   SpO2 99%   BMI 21.97 kg/m²         Intake/Output Summary (Last 24 hours) at 11/19/2021 2323  Last data filed at 11/19/2021 1600  Gross per 24 hour   Intake 0 ml   Output 0 ml   Net 0 ml        Physical Examination:     I had a face to face encounter with this patient and independently examined them on 11/19/2021 as outlined below:          Constitutional:  No acute distress, cooperative, pleasant    HEENT:  Oral mucosa moist, oropharynx benign.     Resp:  decreased breath sounds bilat. No wheezing/rhonchi/rales. No accessory muscle use   CV:  Regular rhythm, normal rate, no murmurs, distant heart sounds, gallops, rubs    GI:  Soft, non distended, non tender. normoactive bowel sounds, no hepatosplenomegaly     Musculoskeletal:  bilat BKA    Neurologic:  History of CVA, residual right-sided weakness. No new focal deficits            Data Review:    Review and/or order of clinical lab test      Labs:     Recent Labs     11/19/21 0453 11/18/21 0236   WBC 6.9 7.6   HGB 8.4* 9.0*   HCT 26.1* 28.4*    165     Recent Labs     11/19/21 0453 11/18/21 0440 11/18/21 0236   * 136 136   K 3.5 3.6 4.0   CL 97 98 100   CO2 24 24 23   BUN 42* 56* 59*   CREA 6.69* 8.75* 8.64*   * 273* 252*   CA 9.5 9.6 9.5   MG 2.2 2.3 2.3   PHOS 3.6  --  3.7     Recent Labs     11/19/21  0453 11/18/21  0440 11/18/21  0236   ALT 15 16 18   AP 64 68 68   TBILI 0.8 0.8 0.8   TP 6.6 7.0 7.2   ALB 2.4* 2.4* 2.4*   GLOB 4.2* 4.6* 4.8*     Recent Labs     11/18/21  1804 11/18/21  0440 11/17/21  1735 11/17/21  0856 11/17/21  0354   INR  --   --   --   --  1.4*   PTP  --   --   --   --  14.0*   APTT 48.4* 42.7* 35.5*   < >  --     < > = values in this interval not displayed. No results for input(s): FE, TIBC, PSAT, FERR in the last 72 hours. No results found for: FOL, RBCF   No results for input(s): PH, PCO2, PO2 in the last 72 hours. No results for input(s): CPK, CKNDX, TROIQ in the last 72 hours.     No lab exists for component: CPKMB  No results found for: CHOL, CHOLX, CHLST, CHOLV, HDL, HDLP, LDL, LDLC, DLDLP, TGLX, TRIGL, TRIGP, CHHD, CHHDX  Lab Results   Component Value Date/Time    Glucose (POC) 200 (H) 11/19/2021 09:04 PM    Glucose (POC) 178 (H) 11/19/2021 05:10 PM    Glucose (POC) 203 (H) 11/19/2021 12:34 PM    Glucose (POC) 206 (H) 11/18/2021 08:58 PM    Glucose (POC) 225 (H) 11/18/2021 05:02 PM     No results found for: COLOR, APPRN, SPGRU, REFSG, ERIC, PROTU, GLUCU, KETU, BILU, UROU, BEATRIS, LEUKU, GLUKE, EPSU, BACTU, WBCU, RBCU, CASTS, UCRY      Medications Reviewed:     Current Facility-Administered Medications   Medication Dose Route Frequency    hydrALAZINE (APRESOLINE) tablet 25 mg  25 mg Oral TID    isosorbide dinitrate (ISORDIL) tablet 10 mg  10 mg Oral TID    cefepime (MAXIPIME) 1 g in 0.9% sodium chloride 10 mL IV syringe  1 g IntraVENous Q24H    carvediloL (COREG) tablet 6.25 mg  6.25 mg Oral BID WITH MEALS    aspirin chewable tablet 81 mg  81 mg Oral DAILY    atorvastatin (LIPITOR) tablet 40 mg  40 mg Oral QHS    epoetin glenny-epbx (RETACRIT) injection 10,000 Units  10,000 Units SubCUTAneous DIALYSIS TUE, THU & SAT    insulin lispro (HUMALOG) injection   SubCUTAneous AC&HS    sodium chloride (NS) flush 5-10 mL  5-10 mL IntraVENous PRN    sodium chloride (NS) flush 5-40 mL  5-40 mL IntraVENous Q8H    sodium chloride (NS) flush 5-40 mL  5-40 mL IntraVENous PRN    acetaminophen (TYLENOL) tablet 650 mg  650 mg Oral Q4H PRN    glucose chewable tablet 16 g  4 Tablet Oral PRN    dextrose (D50W) injection syrg 12.5-25 g  25-50 mL IntraVENous PRN    glucagon (GLUCAGEN) injection 1 mg  1 mg IntraMUSCular PRN     ______________________________________________________________________  EXPECTED LENGTH OF STAY: 4d 2h  ACTUAL LENGTH OF STAY:          3                 Brandt Lozano MD

## 2021-11-21 NOTE — DISCHARGE SUMMARY
Discharge Summary       PATIENT ID: Kashmir Gunter  MRN: [id number]   YOB: 1955    DATE OF ADMISSION: 11/16/2021  6:14 AM    DATE OF DISCHARGE: 11/21/21 10:30am   PRIMARY CARE PROVIDER: Alexandria Min MD     ATTENDING PHYSICIAN: Reva Álvarez  DISCHARGING PROVIDER: Nalini Colindres MD    To contact this individual call [phone] and ask the  to page. If unavailable ask to be transferred the Adult Hospitalist Department. CONSULTATIONS: IP CONSULT TO NEPHROLOGY  IP CONSULT TO NEPHROLOGY  ED CONSULT TO SENIOR SERVICES NURSE PRACTITIONER  IP CONSULT TO ADVANCED HEART FAILURE  IP CONSULT TO CARDIOLOGY    PROCEDURES/SURGERIES: * No surgery found *    ADMITTING DIAGNOSES & HOSPITAL COURSE:   \"Delbert Partidavel a 78 y. o. male who presents with shortness of breath. Patient lives at [facility], comes to the hospital today with hypoxia, patient was diagnosed with pneumonia 3 days back, has history of ESRD and is on dialysis Tuesday Thursday and Saturday.  Patient reports that since last 3 days his breathing is getting worse, he is not able to breathe, has cough, woke up this morning and was quite short of breath, patient is a poor historian, reports that he was recently tested for Covid and it was negative, patient was found to have O2 level in the 70s when EMS arrived, he was placed on a nonrebreather, was brought to the hospital and placed on 4 L of oxygen, continues to be short of breath and reports that he finds it hard to breathe.  Patient was requested to be admitted to the hospital service.  Patient denies any other complaints or problems. DISCHARGE DIAGNOSES / PLAN:      NSTEMI:  EKG no ischemic changes. Troponins trending down now 1474, 2136, 1700 respectively  2D echo 11/17/2021 + for LVEF 20 to 25%. Severe hypokinesis.   Medical management- cardiology adjusting meds- cont aspirin and plavix  Stress test did not show any areas of reversible ischemia     Acute systolic heart failure:   presenting with shortness of breath with elevated troponins. CXR positive for pulmonary edema, elevated troponins, 2D echo showing EF 20-25% Continue beta-blockers, hydralazine and isodil  Strict in and out, cardiac diet, daily weight, scheduled hemodialysis. diet. Cardiology on board, recommendations noted.     Acute hypoxic respiratory failure: due to pneumonia and pulm edema - resolved  Improved, no leukocytosis, afebrile, SPO2 WNL on RA. Multifactorial likely due to acute bacterial pneumonia in the setting of acute CHF, Continue antibiotics on DC= PO augmentin- dose reduced due to dialysis patient.     Diabetes mellitus type 2:  Basal, prandial and sliding scale insulin. Accu-Cheks, hypoglycemic protocol. Adjust meds as needed, outpatient PCP follow-up.     ESRD on HD  On hemodialysis TTS. Continue hemodialysis diet. Nephrology on board. Dialysis per nephrology service.     Anemia  No sign of acute bleeding, likely anemia of CKD. Hemoglobin not at goal.   Patient may need Procrit during hemodialysis.      ADDITIONAL CARE RECOMMENDATIONS:   You were admitted with the following medical issues: plan  1) pneumonia- continue antibiotics, your oxygen levels on room air were normal    2) elevated heart enzymes with history of MI and CHF  Follow up with [address] [place] [address] cardiology in 2 weeks for further evaluation  - you were seen by cardiology at [facility] and started on new medications- coreg, hydralazine and isordil,  You were taken off of gabapentin and amlodipine; continue aspirin and plavix  Patient unable to be started on an ACE or Entresto due to renal function  ECHO here showing EF 20-25% along with some wall motion abnormalities consistent with area of prior MI  Cardiology here also recommending  Referral and evaluation by the to CHF clinic at [address] [place] [address]-  Dr Chon Casas   If after 3 months of optimal medical therapy his ejection fraction remains low- he should have a defibrillator considered      PENDING TEST RESULTS: At the time of discharge the following test results are still pending: none    FOLLOW UP APPOINTMENTS:    Follow-up Information     Follow up With Specialties Details Why Contact Info    Rj Torres MD Surgical Oncology   [address] [place] [address]  [phone]           DIET: Diabetic Diet, cardiac diet  ACTIVITY: Activity as tolerated  WOUND CARE: NA  EQUIPMENT needed: none    DISCHARGE MEDICATIONS:  Current Discharge Medication List      START taking these medications    Details   carvediloL (COREG) 6.25 mg tablet Take 1 Tablet by mouth two (2) times daily (with meals). Qty: 60 Tablet, Refills: 3  Start date: 11/21/2021      hydrALAZINE (APRESOLINE) 25 mg tablet Take 1 Tablet by mouth three (3) times daily. Qty: 90 Tablet, Refills: 3  Start date: 11/21/2021      isosorbide dinitrate (ISORDIL) 10 mg tablet Take 1 Tablet by mouth three (3) times daily. Qty: 90 Tablet, Refills: 3  Start date: 11/21/2021      amoxicillin-clavulanate (Augmentin) 875-125 mg per tablet Take 1 Tablet by mouth daily. Give after dialysis on  Qty: 7 Tablet, Refills: 0  Start date: 11/21/2021         CONTINUE these medications which have CHANGED    Details   albuterol (PROVENTIL HFA, VENTOLIN HFA, PROAIR HFA) 90 mcg/actuation inhaler Take 2 Puffs by inhalation every four (4) hours as needed for Shortness of Breath for up to 14 days. Qty: 18 g, Refills: 4  Start date: 11/21/2021, End date: 12/5/2021         CONTINUE these medications which have NOT CHANGED    Details   sevelamer carbonate (Renvela) 0.8 gram pwpk oral powder 0.8 g three (3) times daily (with meals). atorvastatin (LIPITOR) 40 mg tablet 40 mg nightly. azithromycin (ZITHROMAX) 250 mg tablet 250 mg. Combigan 0.2-0.5 % drop ophthalmic solution Administer 1 Drop to left eye every twelve (12) hours. Glaucoma      chlorproMAZINE (THORAZINE) 50 mg tablet 50 mg three (3) times daily.       clopidogreL (PLAVIX) 75 mg tab 75 mg daily.      gabapentin (NEURONTIN) 100 mg capsule 100 mg nightly. insulin lispro (HUMALOG) 100 unit/mL injection by SubCUTAneous route Before breakfast, lunch, dinner and at bedtime. Inject per sliding scale, 0-200= 0; 201-250= 2 units; 251-300= 4 units; 301-350= 6 units; 351-400= 8 units; 401-999= 10 units. Greater than or equal to 401, give 10 units and CALL MD/NP, subcutaneously before meals and at bedtime for DM.      latanoprost (XALATAN) 0.005 % ophthalmic solution Administer 1 Drop to left eye nightly. Unspecified Glaucoma      omeprazole (PRILOSEC) 40 mg capsule 40 mg daily. sertraline (ZOLOFT) 50 mg tablet 50 mg daily. tamsulosin (FLOMAX) 0.4 mg capsule 0.4 mg.      glucose blood VI test strips (blood glucose test) strip by Does Not Apply route See Admin Instructions. Doxepin 3 mg tab Take 3 mg by mouth nightly. multivitamin (ONE A DAY) tablet Take 1 Tablet by mouth daily. senna (Senna) 8.6 mg tablet Take 1 Tablet by mouth nightly. ascorbic acid, vitamin C, (Vitamin C) 500 mg tablet Take 500 mg by mouth two (2) times a day. ergocalciferol (Vitamin D2) 1,250 mcg (50,000 unit) capsule Take 50,000 Units by mouth every seven (7) days. EVERY Monday. zinc sulfate (ZINCATE) 50 mg zinc (220 mg) capsule Take 1 Capsule by mouth daily. aspirin 81 mg tablet Take 81 mg by mouth daily. Glutose-15 40 % oral gel       fluconazole (DIFLUCAN) 100 mg tablet 100 mg Every Tues, Thur & Sat. For Candidiasis, Unspecified      glucagon (Glucagon Emergency Kit, human,) 1 mg solr by Injection route. Inject 1 gram, intramuscularly as needed for Hypoglycemia Episode Give 1 gram for Blood sugar <60. NOTIFY MD.      sodium chloride (OCEAN) 0.65 % nasal squeeze bottle 1 [place] by Both Nostrils route three (3) times daily as needed for Congestion. acetaminophen (TylenoL) 325 mg tablet Take 650 mg by mouth every four (4) hours as needed for Pain.          STOP taking these medications       predniSONE (DELTASONE) 50 mg tablet Comments:   Reason for Stopping:         amlodipine (NORVASC) 10 mg tablet Comments:   Reason for Stopping:             NOTIFY YOUR PHYSICIAN FOR ANY OF THE FOLLOWING:   Fever over 101 degrees for 24 hours. Chest pain, shortness of breath, fever, chills, nausea, vomiting, diarrhea, change in mentation, falling, weakness, bleeding. Severe pain or pain not relieved by medications. Or, any other signs or symptoms that you may have questions about.     DISPOSITION:    Home With:   OT  PT  HH  RN      X Long term care facility    Independent/assisted living    Hospice    Other:       PATIENT CONDITION AT DISCHARGE:     Functional status    Poor    X Deconditioned     Independent      Cognition     Lucid    X Forgetful     Dementia      Catheters/lines (plus indication)    Ledezma     PICC     PEG    X None      Code status   X  Full code     DNR      PHYSICAL EXAMINATION AT DISCHARGE:  Patient Vitals for the past 24 hrs:   Temp Pulse Resp BP SpO2   11/21/21 1000  (!) 105   97 %   11/21/21 0800  89      11/21/21 0643 98.2 °F (36.8 °C) (!) 106 22 133/86 98 %   11/21/21 0551  91      11/21/21 0355  80      11/21/21 0312 98.3 °F (36.8 °C) 87 19 137/84 96 %   11/21/21 0201  83      11/20/21 2318 98.4 °F (36.9 °C) 90 24 114/87 98 %   11/20/21 2218  87  (!) 138/93    11/20/21 2204  98      11/20/21 2000  89      11/20/21 1900 97.6 °F (36.4 °C) (!) 105 18 112/85 95 %   11/20/21 1800  97      11/20/21 1600 98.4 °F (36.9 °C) 95 20 120/82 95 %   11/20/21 1551  94 21 (!) 141/83 98 %   11/20/21 1400  88      11/20/21 1315  84  112/76 99 %   11/20/21 1300  81  111/77 100 %   11/20/21 1245  82  106/80 98 %   11/20/21 1230  83  134/73 99 %   11/20/21 1215  82  132/79 99 %   11/20/21 1200  86  123/83 98 %   11/20/21 1152 97.8 °F (36.6 °C)       11/20/21 1145  81  126/84 100 %   11/20/21 1130  84  (!) 124/90 96 %   11/20/21 1115  82  126/86 98 %   11/20/21 1100  80 20 115/73 99 %     General:          awake, following commands, chronically ill appearing     HEENT:           Atraumatic, anicteric sclerae, pink conjunctivae  Neck:               Supple, symmetrical  Lungs:             Clear to auscultation bilaterally. No Wheezing   Chest wall:      No tenderness  No Accessory muscle use. Heart:              Regular  rhythm,  2/6 SE  murmur   No edema  Abdomen:        Soft, non-tender. Not distended. Extremities:     mild edema- bilat AKA, severe generalized weakness  Skin:                Not pale. Not Jaundiced     Psych:             Not anxious or agitated. Flat affect  Neurologic:     no focal neuro deficits- moving all 4 ext- decreased movement in bilat LE     Recent Labs     11/20/21  0506 11/19/21 0453   WBC 5.9 6.9   HGB 8.3* 8.4*   HCT 26.1* 26.1*    169              Recent Labs     11/20/21  0506 11/19/21  0453 11/18/21  0440 11/18/21  0236 11/18/21  0236    135* 136   < > 136   K 3.4* 3.5 3.6   < > 4.0   CL 99 97 98   < > 100   CO2 24 24 24   < > 23   BUN 54* 42* 56*   < > 59*   CREA 9.07* 6.69* 8.75*   < > 8.64*   * 208* 273*   < > 252*   CA 9.5 9.5 9.6   < > 9.5   MG 2.4 2.2 2.3   < > 2.3   PHOS 4.3 3.6  --   --  3.7    < > = values in this interval not displayed.            Recent Labs     11/20/21  0506 11/19/21  0453 11/18/21  0440   ALT 14 15 16   AP 66 64 68   TBILI 0.7 0.8 0.8   TP 6.9 6.6 7.0   ALB 2.5* 2.4* 2.4*   GLOB 4.4* 4.2* 4.6*           Recent Labs     11/18/21  1804 11/18/21  0440   APTT 48.4* 42.7*      No results for input(s): FE, TIBC, PSAT, FERR in the last 72 hours. No results found for: FOL, RBCF   No results for input(s): PH, PCO2, PO2 in the last 72 hours.   No results for input(s): CPK, CKNDX, TROIQ in the last 72 hours.     No lab exists for component: CPKMB  No results found for: CHOL, CHOLX, CHLST, CHOLV, HDL, HDLP, LDL, LDLC, DLDLP, TGLX, TRIGL, TRIGP, CHHD, CHHDX Lab Results   Component Value Date/Time     Glucose (POC) 203 (H) 11/20/2021 09:25 PM     Glucose (POC) 253 (H) 11/20/2021 04:55 PM     Glucose (POC) 147 (H) 11/20/2021 11:50 AM     Glucose (POC) 176 (H) 11/20/2021 08:48 AM     Glucose (POC) 200 (H) 11/19/2021 09:04 PM      No results found for: COLOR, APPRN, SPGRU, REFSG, ERIC, PROTU, GLUCU, KETU, BILU, UROU, BEATRIS, LEUKU, GLUKE, EPSU, BACTU, WBCU, RBCU, CASTS, UCRY    XR CHEST PORT     INDICATION: Eval for Infiltrate     COMPARISON: None.     FINDINGS: A portable AP radiograph of the chest was obtained at 0757 hours. The  patient is on a cardiac monitor. The heart size at the upper limits of normal.  There is blunting of costophrenic angles and hazy opacities each lung base  consistent with pleural effusions which are small. There is pulmonary vascular  congestion and there is predominantly central interstitial and airspace  opacities with a fairly symmetric distribution. These findings are most  consistent with pulmonary edema. Superimposed pneumonia would be difficult to  exclude.     IMPRESSION  1. The above findings are most consistent with pulmonary edema. Superimposed  pneumonia would be difficult to exclude. Interpretation Summary- stress test       · Baseline ECG: Sinus tachycardia, occasional PVCs, non-specific ST-T wave abnormalities, LVH with repolarization abnormality (strain). · NM: Anterior infarct. No ischemia. LVEF 15%. Rest and Lexiscan myocardial perfusion SPECT imaging is performed with IV injections of 10.5 and 30.3 mCi technetium 99m Sestamibi respectively.     SPECT images displayed in three planes demonstrate large fixed defect of the anterior wall compatible with infarct. There is no region of reversibility/ischemia.      Gated SPECT images reviewed in 3 planes exhibit diffuse hypokinesis and diminished LV systolic wall thickening. The calculated LV ejection fraction is 15%.  Pulmonary uptake is normal. LV cavity size appears large on both sequences.        IMPRESSION: Anterior infarct. No ischemia. LVEF 15%. Echo Findings    Left Ventricle Normal wall thickness. Mildly dilated left ventricle. Severe hypokinesis of the basal anterolateral, mid anterolateral and apical lateral wall(s). Dyskinesis of the basal inferoseptal and mid anteroseptal wall(s). The estimated EF is 20 - 25%. Moderate-to-severely and segmentally reduced systolic function. Left Atrium Mildly dilated left atrium. Right Ventricle Normal cavity size, wall thickness and global systolic function. Right Atrium Normal cavity size. Interatrial Septum No interatrial shunt visualized on color doppler. Aortic Valve No stenosis and no regurgitation. Severe focal thickening present. Moderate aortic valve sclerosis with no evidence of reduced excursion. There is leaflet calcification. Mitral Valve No stenosis. Mitral valve thickening. Leaflet calcification w/o reduced excursion. Mild to moderate regurgitation. Tricuspid Valve Normal valve structure and no stenosis. Mild regurgitation. Pulmonic Valve Pulmonic valve not well visualized, but normal doppler findings. No stenosis. Aorta Normal aortic root. Pulmonary Artery Normal pulmonary arteries. IVC/Hepatic Veins Normal structure. Pericardium No evidence of pericardial effusion. Wall Scoring    Score Index: 3.000 Percent Normal: 0.0%             The following segments are dyskinetic: mid anteroseptal, apical septal and apex. The following segments are hypokinetic: mid inferoseptal, mid anterolateral and apical lateral.  Other segments could not be evaluated.                    CHRONIC MEDICAL DIAGNOSES:  Problem List as of 11/21/2021 Never Reviewed          Codes Class Noted - Resolved    Coronary artery disease involving native coronary artery of native heart without angina pectoris (Chronic) ICD-10-CM: I25.10  ICD-9-CM: 414.01  11/18/2021 - Present    Overview Signed 11/18/2021 12:29 PM by Peace Lee MD     Cath from [address] [place] [address]  6/20/17 for abnormal stress test  LM ok  LAD  prox with benny from RCA and distal LAD occluded  D1 prox 70  D2 prox 90  Circ pMLI  OM1 50%   RCA large, mid 40             * (Principal) NSTEMI (non-ST elevated myocardial infarction) ([facility] ICD-10-CM: I21.4  ICD-9-CM: 410.70  11/18/2021 - Present    Overview Signed 11/18/2021 12:30 PM by Peace Lee MD     Peak troponin 2136 11/17/21  Hx of LAD  in 2017  EF 11/17/21 25%             Systolic CHF, acute on chronic [facility]) ICD-10-CM: I50.23  ICD-9-CM: 428.23, 428.0  11/18/2021 - Present    Overview Signed 11/18/2021 12:31 PM by Peace Lee MD     11/16/21    ECHO ADULT COMPLETE 11/18/2021 11/18/2021    Interpretation Summary  · LV: Estimated LVEF is 20 - 25%. Normal wall thickness. Mildly dilated left ventricle. Moderate-to-severely and segmentally reduced systolic function. Severe hypokinesis of the basal anterolateral, mid anterolateral and apical lateral wall(s). Dyskinesis of the basal inferoseptal and mid anteroseptal wall(s). · AV: Severe aortic valve focal thickening present. Moderate aortic valve sclerosis with no evidence of reduced excursion. Aortic valve leaflet calcification present. · MV: Mitral valve thickening. Mitral valve leaflet calcification without reduced excursion. Mild to moderate mitral valve regurgitation is present. · TV: Mild tricuspid valve regurgitation is present. · LA: Mildly dilated left atrium.     Signed by: Peace Lee MD on 11/18/2021 12:08 AM                Respiratory failure ([facility] ICD-10-CM: J96.90  ICD-9-CM: 518.81  11/16/2021 - Present        Anemia in chronic kidney disease ICD-10-CM: N18.9, D63.1  ICD-9-CM: 285.21  5/27/2019 - Present        Diabetes mellitus due to underlying condition with diabetic nephropathy ([facility] ICD-10-CM: E08.21  ICD-9-CM: 249.40, 583.81  5/27/2019 - Present        End stage renal disease ([facility] ICD-10-CM: N18.6  ICD-9-CM: 585.6  5/27/2019 - Present              Greater than 30 minutes were spent with the patient on counseling and coordination of care    Signed:   Ira Jansen MD  11/21/2021  10:49 AM   .

## 2021-11-21 NOTE — PROGRESS NOTES
Transition of Care Plan  RUR 18%    Disposition  Return to [facility]  [address]  Direct [address] 19 test required and has been completed and negative    Transportation  [facility] phone [phone]  4:15 pm transport confirmed    Medical follow up  PCP and specialist    Out Patient Hemodialysis   Return to [facility]  [phone]  TTS    Contact  Sister  Prince Crenshaw  [phone]    Patient being discharged today-Patient in agreement   CM attempted to talk with his sister, Lino Lunsford but there was not a HIPPA approved message --no message left. CM told patient that an attempt was made     - Cm sent medical vai CC Link and talked to [address] [place] [address] who confirmed that patient can return to the facility today. As requested a Rapid COVID test has been done and is negative. Copy of results in envelope    Referral sent to [facility] And 4:15 pm confirmed. Nurse to call report to [address] [place], [place] [address]y 2 and ambulance form on chart     Transition of Care Plan to SNF/Rehab    SNF/Rehab Transition:  Patient has been accepted to [facility] and meets criteria for admission. Patient will transported by [facility] and expected to leave at 4:15pm.    Communication to Patient/Family:  Met  and they are agreeable to the transition plan. Communication to SNF/Rehab:  Bedside RN, Deborah, has been notified to update the transition plan to the facility and call report (phone number [phone] A wing). Discharge information has been updated on the AVS.     Discharge instructions to be fax'd to facility at secured from [address] [place] [address] Please include all hard scripts for controlled substances, med rec and dc summary, and AVS in packet.      Reviewed and confirmed with facility, [address] [place] [address] can manage the patient care needs for the following:     Rigo Harper with (X) only those applicable:    Medication:  [x]  Medications will be available at the facility  []  IV Antibiotics  [] Controlled Substance - hard copy to be sent with patient   []  Weekly Labs   Documents:  [x] Hard RX  [x] MAR  [x] Kardex  [x] AVS  []Transfer Summary  []Discharge   Equipment:  []  CPAP/BiPAP  []  Wound Vacuum  []  Ledezma or Urinary Device  []  PICC/Central Line  []  Nebulizer  []  Ventilator   Treatment:  []Isolation (for MRSA, VRE, etc.)  []Surgical Drain Management  []Tracheostomy Care  []Dressing Changes  []Dialysis with transportation and chair time  TTS [facility]  []PEG Care  []Oxygen  []Daily Weights for Heart Failure   Dietary:  []Any diet limitations  []Tube Feedings   []Total Parenteral Management (TPN)   Eligible for Medicaid Long Term Services and Supports  Yes:  [x] Eligible for medical assistance or will become eligible within 180 days and UAI completed. [] Provider/Patient and/or support system has requested screening. [] UAI copy provided to patient or responsible party,  [] UAI unavailable at discharge will send once processed to SNF provider. [] UAI unavailable at discharged mailed to patient  No:   [] Private pay and is not financially eligible for Medicaid within the next 180 days. [] Reside out-of-state.   [] A residents of a state owned/operated facility that is licensed  by [address] and [employer] or [facility]  [] Enrollment in [facility] hospice services  [] [address] [place] [address] [address]  [] Patient /Family declines to have screening completed or provide financial information for screening     Financial Resources:  Medicaid    [] Initiated and application pending   [] Full coverage     Advanced Care Plan:  []Surrogate Decision Maker of Care  []POA  []Communicated Code Status full code   Other

## 2021-11-21 NOTE — DISCHARGE INSTRUCTIONS
You were admitted with the following medical issues: plan  1) pneumonia- continue antibiotics, your oxygen levels on room air were normal    2) elevated heart enzymes with history of MI and CHF  Follow up with [address] [place] [address] cardiology in 2 weeks for further evaluation  - you were seen by cardiology at [facility] and started on new medications- coreg, hydralazine and isordil,  You were taken off of gabapentin and amlodipine; continue aspirin and plavix  Patient unable to be started on an ACE or Entresto due to renal function  ECHO here showing EF 20-25% along with some wall motion abnormalities consistent with area of prior MI  Cardiology here also recommending  Referral and evaluation by the to CHF clinic at [address] [place] [address]-  Dr Poppy Irvin   If after 3 months of optimal medical therapy his ejection fraction remains low- he should have a defibrillator considered

## 2021-11-21 NOTE — PROGRESS NOTES
Bedside shift change report given to Deborah (oncoming nurse) by Maximino Jackson (offgoing nurse). Report included the following information SBAR, Kardex, ED Summary, MAR, Recent Results and Cardiac Rhythm NSR. Problem: Diabetes Self-Management  Goal: *Monitoring blood glucose, interpreting and using results  Description: Identify recommended blood glucose targets  and personal targets. Outcome: Progressing Towards Goal     Problem: Falls - Risk of  Goal: *Absence of Falls  Description: Document Leafy Corral Fall Risk and appropriate interventions in the flowsheet. Outcome: Progressing Towards Goal  Note: Fall Risk Interventions:  Mobility Interventions: Communicate number of staff needed for ambulation/transfer, Patient to call before getting OOB         Medication Interventions: Evaluate medications/consider consulting pharmacy    Elimination Interventions: Call light in reach, Patient to call for help with toileting needs, Toileting schedule/hourly rounds              Problem: Pressure Injury - Risk of  Goal: *Prevention of pressure injury  Description: Document Laureano Scale and appropriate interventions in the flowsheet.   Outcome: Progressing Towards Goal  Note: Pressure Injury Interventions:  Sensory Interventions: Float heels, Keep linens dry and wrinkle-free, Maintain/enhance activity level    Moisture Interventions: Absorbent underpads    Activity Interventions: Pressure redistribution bed/mattress(bed type)    Mobility Interventions: Pressure redistribution bed/mattress (bed type)    Nutrition Interventions: Document food/fluid/supplement intake    Friction and Shear Interventions: Minimize layers, Apply protective barrier, creams and emollients

## 2021-11-21 NOTE — PROGRESS NOTES
EMS arrived to transport pt. Took vital signs, removed lines/leads, packed pt belongings and chart and gave to transport. Verbal report given to EMT. Report included O2 requirements, Hx & P, orientation status, meds currently on board. 1600  Called report to [place], at [facility]. Report included admission diagnosis, current medical status, vitals, and O2 requirements. [facility] already had background information. [facility] scheduled to  at 4:15. Pt informed of impending transfer, belongings are packed and chart prepared for [facility]. [facility] [facility] to call report again, no one available to take call. Lefts 2nd message. 1500  Attempted to call sister, Leeann Meeks, and update her about pt moving back to [facility] today. Phone message said that number is \"not accepting calls. \"    1500  Attempted to call report to [facility], was put on hold, transferred twice and left a message to have them call me back    1000  Pt up in bed, setup with tray.

## 2021-11-21 NOTE — PROGRESS NOTES
Bedside shift change report given to Mai Rinaldi RN (oncoming nurse) by Melvina Rivers RN (offgoing nurse). Report included the following information SBAR, Kardex, MAR, Recent Results and Cardiac Rhythm NSR.

## 2021-11-21 NOTE — PROGRESS NOTES
[facility] [facility] Adult  Hospitalist Group                                                                                          Hospitalist Progress Note  Alber Mccallum MD  Answering service: [phone] from in house phone        Date of Service:  2021  NAME:  Justin Burrell  :  1955  MRN:  [id number]      Admission Summary:   \"Delbert Rey is a 78 y.o. male who presents with shortness of breath. Patient lives at [facility], comes to the hospital today with hypoxia, patient was diagnosed with pneumonia 3 days back, has history of ESRD and is on dialysis  and Saturday. Patient reports that since last 3 days his breathing is getting worse, he is not able to breathe, has cough, woke up this morning and was quite short of breath, patient is a poor historian, reports that he was recently tested for Covid and it was negative, patient was found to have O2 level in the 70s when EMS arrived, he was placed on a nonrebreather, was brought to the hospital and placed on 4 L of oxygen, continues to be short of breath and reports that he finds it hard to breathe. Patient was requested to be admitted to the hospital service. Patient denies any other complaints or problems. Interval history / Subjective:   No acute events overnight. Patient reporting mild improvement of respiratory symptoms,  Cardiology adjusting meds  Dialysis this am     Assessment & Plan:     NSTEMI:  EKG no ischemic changes. Troponins trending down now 1474, 2136, 1700 respectively  2D echo 2021 + for LVEF 20 to 25%. Severe hypokinesis. Medical management- cardiology adjusting meds    Acute systolic heart failure:   No history of CAD, presenting with shortness of breath with elevated troponins. CXR positive for pulmonary edema, elevated troponins, 2D echo severely reduced ejection fraction. Continue beta-blockers, ACE.   Strict in and out, cardiac diet, daily weight, scheduled hemodialysis. diet. Cardiology on board, recommendations noted. Acute hypoxic respiratory failure: due to pneumonia and pulm edema   Improved, no leukocytosis, afebrile, SPO2 WNL on RA. Multifactorial likely due to acute bacterial pneumonia in the setting of acute CHF, NSTEMI complicated by end-stage renal disease. Continue broad-spectrum IV antibiotics. Continue supplemental oxygen, flutter valve, incentive spirometry, as needed DuoNeb's. Follow-up cultures. Diabetes mellitus type 2:  Basal, prandial and sliding scale insulin. Accu-Cheks, hypoglycemic protocol. Adjust meds as needed, outpatient PCP follow-up. ESRD on HD  On hemodialysis TTS. Continue hemodialysis diet. Nephrology on board. Dialysis as per nephrology service. Anemia  No sign of acute bleeding, likely anemia of CKD. Hemoglobin not at goal. Patient may need Procrit during hemodialysis.     Code status: Full  DVT prophylaxis: Lovenox    Care Plan discussed with: Patient/Family  Anticipated Disposition: Home w/Family  Anticipated Discharge: 24 hours to 48 hours     Hospital Problems  Never Reviewed          Codes Class Noted POA    Coronary artery disease involving native coronary artery of native heart without angina pectoris (Chronic) ICD-10-CM: I25.10  ICD-9-CM: 414.01  11/18/2021 Yes    Overview Signed 11/18/2021 12:29 PM by Eleazar Meier MD     Cath from [address] [place] [address]  6/20/17 for abnormal stress test  LM ok  LAD  prox with benny from RCA and distal LAD occluded  D1 prox 70  D2 prox 90  Circ pMLI  OM1 50%   RCA large, mid 40             * (Principal) NSTEMI (non-ST elevated myocardial infarction) ([facility] ICD-10-CM: I21.4  ICD-9-CM: 410.70  11/18/2021 Yes    Overview Signed 11/18/2021 12:30 PM by Eleazar Meier MD     Peak troponin 2136 11/17/21  Hx of LAD  in 2017  EF 11/17/21 25%             Systolic CHF, acute on chronic ([facility] ICD-10-CM: I50.23  ICD-9-CM: 428.23, 428.0  11/18/2021 Yes    Overview Signed 11/18/2021 12:31 PM by Lyndsay Cunningham MD     11/16/21    ECHO ADULT COMPLETE 11/18/2021 11/18/2021    Interpretation Summary  · LV: Estimated LVEF is 20 - 25%. Normal wall thickness. Mildly dilated left ventricle. Moderate-to-severely and segmentally reduced systolic function. Severe hypokinesis of the basal anterolateral, mid anterolateral and apical lateral wall(s). Dyskinesis of the basal inferoseptal and mid anteroseptal wall(s). · AV: Severe aortic valve focal thickening present. Moderate aortic valve sclerosis with no evidence of reduced excursion. Aortic valve leaflet calcification present. · MV: Mitral valve thickening. Mitral valve leaflet calcification without reduced excursion. Mild to moderate mitral valve regurgitation is present. · TV: Mild tricuspid valve regurgitation is present. · LA: Mildly dilated left atrium. Signed by: Lyndsay Cunningham MD on 11/18/2021 12:08 AM                Respiratory failure ([address] ICD-10-CM: J96.90  ICD-9-CM: 518.81  11/16/2021 Yes        Anemia in chronic kidney disease ICD-10-CM: N18.9, D63.1  ICD-9-CM: 285.21  5/27/2019 Yes        End stage renal disease [facility]) ICD-10-CM: N18.6  ICD-9-CM: 585.6  5/27/2019 Yes                Review of Systems:   A comprehensive review of systems was negative except for that written in the HPI. Vital Signs:    Last 24hrs VS reviewed since prior progress note.  Most recent are:  Visit Vitals  /87 (BP 1 Location: Left upper arm, BP Patient Position: At rest)   Pulse 90   Temp 98.4 °F (36.9 °C)   Resp 24   Ht 6' 3\" (1.905 m)   Wt 79.7 kg (175 lb 12.8 oz)   SpO2 98%   BMI 21.97 kg/m²         Intake/Output Summary (Last 24 hours) at 11/20/2021 2349  Last data filed at 11/20/2021 1315  Gross per 24 hour   Intake 100 ml   Output 2500 ml   Net -2400 ml        Physical Examination:     I had a face to face encounter with this patient and independently examined them on 11/20/2021 as outlined below:          Constitutional:  No acute distress, cooperative, pleasant    HEENT:  Oral mucosa moist, oropharynx benign. Resp:  decreased breath sounds bilat. No wheezing/rhonchi/rales. No accessory muscle use   CV:  Regular rhythm, normal rate, no murmurs, distant heart sounds, gallops, rubs    GI:  Soft, non distended, non tender. normoactive bowel sounds, no hepatosplenomegaly     Musculoskeletal:  bilat BKA    Neurologic:  History of CVA, residual right-sided weakness. No new focal deficits            Data Review:    Review and/or order of clinical lab test      Labs:     Recent Labs     11/20/21  0506 11/19/21 0453   WBC 5.9 6.9   HGB 8.3* 8.4*   HCT 26.1* 26.1*    169     Recent Labs     11/20/21  0506 11/19/21 0453 11/18/21 0440 11/18/21  0236 11/18/21  0236    135* 136   < > 136   K 3.4* 3.5 3.6   < > 4.0   CL 99 97 98   < > 100   CO2 24 24 24   < > 23   BUN 54* 42* 56*   < > 59*   CREA 9.07* 6.69* 8.75*   < > 8.64*   * 208* 273*   < > 252*   CA 9.5 9.5 9.6   < > 9.5   MG 2.4 2.2 2.3   < > 2.3   PHOS 4.3 3.6  --   --  3.7    < > = values in this interval not displayed. Recent Labs     11/20/21  0506 11/19/21 0453 11/18/21 0440   ALT 14 15 16   AP 66 64 68   TBILI 0.7 0.8 0.8   TP 6.9 6.6 7.0   ALB 2.5* 2.4* 2.4*   GLOB 4.4* 4.2* 4.6*     Recent Labs     11/18/21  1804 11/18/21 0440   APTT 48.4* 42.7*      No results for input(s): FE, TIBC, PSAT, FERR in the last 72 hours. No results found for: FOL, RBCF   No results for input(s): PH, PCO2, PO2 in the last 72 hours. No results for input(s): CPK, CKNDX, TROIQ in the last 72 hours.     No lab exists for component: CPKMB  No results found for: CHOL, CHOLX, CHLST, CHOLV, HDL, HDLP, LDL, LDLC, DLDLP, TGLX, TRIGL, TRIGP, CHHD, CHHDX  Lab Results   Component Value Date/Time    Glucose (POC) 203 (H) 11/20/2021 09:25 PM    Glucose (POC) 253 (H) 11/20/2021 04:55 PM    Glucose (POC) 147 (H) 11/20/2021 11:50 AM    Glucose (POC) 176 (H) 11/20/2021 08:48 AM    Glucose (POC) 200 (H) 11/19/2021 09:04 PM     No results found for: COLOR, APPRN, SPGRU, REFSG, ERIC, PROTU, GLUCU, KETU, BILU, UROU, BEATRIS, LEUKU, GLUKE, EPSU, BACTU, WBCU, RBCU, CASTS, UCRY      Medications Reviewed:     Current Facility-Administered Medications   Medication Dose Route Frequency    hydrALAZINE (APRESOLINE) tablet 25 mg  25 mg Oral TID    isosorbide dinitrate (ISORDIL) tablet 10 mg  10 mg Oral TID    cefepime (MAXIPIME) 1 g in 0.9% sodium chloride 10 mL IV syringe  1 g IntraVENous Q24H    carvediloL (COREG) tablet 6.25 mg  6.25 mg Oral BID WITH MEALS    aspirin chewable tablet 81 mg  81 mg Oral DAILY    atorvastatin (LIPITOR) tablet 40 mg  40 mg Oral QHS    epoetin glenny-epbx (RETACRIT) injection 10,000 Units  10,000 Units SubCUTAneous DIALYSIS TUE, THU & SAT    insulin lispro (HUMALOG) injection   SubCUTAneous AC&HS    sodium chloride (NS) flush 5-10 mL  5-10 mL IntraVENous PRN    sodium chloride (NS) flush 5-40 mL  5-40 mL IntraVENous Q8H    sodium chloride (NS) flush 5-40 mL  5-40 mL IntraVENous PRN    acetaminophen (TYLENOL) tablet 650 mg  650 mg Oral Q4H PRN    glucose chewable tablet 16 g  4 Tablet Oral PRN    dextrose (D50W) injection syrg 12.5-25 g  25-50 mL IntraVENous PRN    glucagon (GLUCAGEN) injection 1 mg  1 mg IntraMUSCular PRN     ______________________________________________________________________  EXPECTED LENGTH OF STAY: 4d 2h  ACTUAL LENGTH OF STAY:          4                 Nalini Colindres MD

## 2021-11-21 NOTE — PROGRESS NOTES
Problem: Ventilator Management  Goal: *Adequate oxygenation and ventilation  Outcome: Resolved/Met  Goal: *Patient maintains clear airway/free of aspiration  Outcome: Resolved/Met  Goal: *Absence of infection signs and symptoms  Outcome: Resolved/Met  Goal: *Normal spontaneous ventilation  Outcome: Resolved/Met     Problem: Patient Education: Go to Patient Education Activity  Goal: Patient/Family Education  Outcome: Resolved/Met     Problem: Diabetes Self-Management  Goal: *Disease process and treatment process  Description: Define diabetes and identify own type of diabetes; list 3 options for treating diabetes. Outcome: Resolved/Met  Goal: *Incorporating nutritional management into lifestyle  Description: Describe effect of type, amount and timing of food on blood glucose; list 3 methods for planning meals. Outcome: Resolved/Met  Goal: *Incorporating physical activity into lifestyle  Description: State effect of exercise on blood glucose levels. Outcome: Resolved/Met  Goal: *Developing strategies to promote health/change behavior  Description: Define the ABC's of diabetes; identify appropriate screenings, schedule and personal plan for screenings. Outcome: Resolved/Met  Goal: *Using medications safely  Description: State effect of diabetes medications on diabetes; name diabetes medication taking, action and side effects. Outcome: Resolved/Met  Goal: *Monitoring blood glucose, interpreting and using results  Description: Identify recommended blood glucose targets  and personal targets. Outcome: Resolved/Met  Goal: *Prevention, detection, treatment of acute complications  Description: List symptoms of hyper- and hypoglycemia; describe how to treat low blood sugar and actions for lowering  high blood glucose level.   Outcome: Resolved/Met  Goal: *Prevention, detection and treatment of chronic complications  Description: Define the natural course of diabetes and describe the relationship of blood glucose levels to long term complications of diabetes. Outcome: Resolved/Met  Goal: *Developing strategies to address psychosocial issues  Description: Describe feelings about living with diabetes; identify support needed and support network  Outcome: Resolved/Met  Goal: *Insulin pump training  Outcome: Resolved/Met  Goal: *Sick day guidelines  Outcome: Resolved/Met  Goal: *Patient Specific Goal (EDIT GOAL, INSERT TEXT)  Outcome: Resolved/Met     Problem: Patient Education: Go to Patient Education Activity  Goal: Patient/Family Education  Outcome: Resolved/Met     Problem: Falls - Risk of  Goal: *Absence of Falls  Description: Document Celeste Fall Risk and appropriate interventions in the flowsheet. Outcome: Resolved/Met  Note: Fall Risk Interventions:  Mobility Interventions: Communicate number of staff needed for ambulation/transfer         Medication Interventions: Patient to call before getting OOB    Elimination Interventions: Call light in reach              Problem: Patient Education: Go to Patient Education Activity  Goal: Patient/Family Education  Outcome: Resolved/Met     Problem: Pressure Injury - Risk of  Goal: *Prevention of pressure injury  Description: Document Laureano Scale and appropriate interventions in the flowsheet.   Outcome: Resolved/Met  Note: Pressure Injury Interventions:  Sensory Interventions: Float heels    Moisture Interventions: Absorbent underpads    Activity Interventions: Assess need for specialty bed    Mobility Interventions: Pressure redistribution bed/mattress (bed type)    Nutrition Interventions: Document food/fluid/supplement intake    Friction and Shear Interventions: Minimize layers                Problem: Patient Education: Go to Patient Education Activity  Goal: Patient/Family Education  Outcome: Resolved/Met

## 2021-11-29 NOTE — DISCHARGE INSTRUCTIONS
Martell Marie  [id number]  1955    EGD DISCHARGE INSTRUCTIONS  Discomfort:  Sore throat- throat lozenges or warm salt water gargle  redness at IV site- apply warm compress to area; if redness or soreness persist- contact your physician  Gaseous discomfort- walking, belching will help relieve any discomfort  You may not operate a vehicle for 12 hours  You may not engage in an occupation involving machinery or appliances for rest of today  You may not drink alcoholic beverages for at least 12 hours  Avoid making any critical decisions for at least 24 hour  DIET  You may have minimal sips at this time-- do not eat or drink for two hours. You may eat and drink after 11am today  You may resume your regular diet - however -  remember your colon is empty and a heavy meal will produce gas. Avoid these foods:  vegetables, fried / greasy foods, carbonated drinks    MEDICATIONS:        ACTIVITY  You may resume your normal daily activities until tomorrow AM;  Spend the remainder of the day resting -  avoid any strenuous activity. CALL M.D.   ANY SIGN OF   Increasing pain, nausea, vomiting  Abdominal distension (swelling)  New increased bleeding (oral or rectal)  Fever (chills)  Pain in chest area  Bloody discharge from nose or mouth  Shortness of breath    IMPRESSION:  -Whitish plaques in esophagus, suggestive of candidiasis; brushings obtained  -Normal mucosa at gastroesophageal junction; biopsied  -Mild gastric antral erythema with erosions; biopsied  -Normal duodenal mucosa    Follow-up Instructions:   Call Dr. Lyle Mejía for the results of procedure / biopsy in 7-10 days   Telephone # [phone]  Continue Plavix unchanged  Continue omeprazole unchanged    Sean Barron MD

## 2021-11-29 NOTE — ANESTHESIA PREPROCEDURE EVALUATION
Relevant Problems   No relevant active problems       Anesthetic History   No history of anesthetic complications            Review of Systems / Medical History  Patient summary reviewed and pertinent labs reviewed    Pulmonary              Pertinent negatives: No COPD and asthma     Neuro/Psych       CVA (Left sided weakness and facial droop)       Cardiovascular    Hypertension          CAD  Pertinent negatives: No CHF    Comments: TTE (11/2021):  · LV: Estimated LVEF is 20 - 25%. Normal wall thickness. Mildly dilated left ventricle. Severe hypokinesis of the basal anterolateral, mid anterolateral and apical lateral wall(s). Dyskinesis of the basal inferoseptal and mid anteroseptal wall(s). · AV:  Moderate aortic valve sclerosis with no evidence of reduced excursion. · MV: Kathrene Bolk Mild to moderate mitral valve regurgitation is present.          GI/Hepatic/Renal         Renal disease: ESRD and dialysis       Endo/Other    Diabetes: poorly controlled, type 2    Anemia  Pertinent negatives: No obesity   Other Findings   Comments: Last HD on Saturday (11/27)    Bilateral BKA 2/2 uncontrolled DM         Physical Exam    Airway  Mallampati: II  TM Distance: > 6 cm  Neck ROM: normal range of motion   Mouth opening: Normal     Cardiovascular  Regular rate and rhythm,  S1 and S2 normal,  no murmur, click, rub, or gallop  Rhythm: regular  Rate: normal         Dental    Dentition: Poor dentition     Pulmonary  Breath sounds clear to auscultation               Abdominal  GI exam deferred       Other Findings            Anesthetic Plan    ASA: 3  Anesthesia type: MAC          Induction: Intravenous  Anesthetic plan and risks discussed with: Patient

## 2021-11-29 NOTE — ANESTHESIA POSTPROCEDURE EVALUATION
Procedure(s):  ESOPHAGOGASTRODUODENOSCOPY (EGD)  ESOPHAGOGASTRODUODENAL (EGD) BIOPSY  ENDOSCOPIC BRUSHING. No value filed. Anesthesia Post Evaluation      Multimodal analgesia: multimodal analgesia used between 6 hours prior to anesthesia start to PACU discharge  Patient location during evaluation: PACU  Patient participation: complete - patient participated  Level of consciousness: awake and alert  Pain management: adequate  Airway patency: patent  Anesthetic complications: no  Cardiovascular status: acceptable, hemodynamically stable and blood pressure returned to baseline  Respiratory status: acceptable and room air  Hydration status: euvolemic  Post anesthesia nausea and vomiting:  none  Final Post Anesthesia Temperature Assessment:  Normothermia (36.0-37.5 degrees C)      INITIAL Post-op Vital signs:   Vitals Value Taken Time   /85 11/29/21 1106   Temp 36.4 °C (97.6 °F) 11/29/21 1040   Pulse 99 11/29/21 1107   Resp 18 11/29/21 1107   SpO2 95 % 11/29/21 1107   Vitals shown include unvalidated device data.

## 2021-11-29 NOTE — PROCEDURES
NAME:  Sara Patino   :   1955   MRN:   [id number]     Date/Time:  2021 10:28 AM    Esophagogastroduodenoscopy (EGD) Procedure Note    Procedure: Esophagogastroduodenoscopy with biopsy, with esophageal brushing    Indication: Vomiting  Pre-operative Diagnosis: see indication above  Post-operative Diagnosis: see findings below  :  Merlin Coop, MD  Referring Provider:   Eden Perla MD    Exam:  Airway: clear, no airway problems anticipated  Heart: RRR, without gallops or rubs  Lungs: clear bilaterally without wheezes, crackles, or rhonchi  Abdomen: soft, nontender, nondistended, bowel sounds present  Mental Status: awake, alert and oriented to person, place and time     Anethesia/Sedation:  MAC anesthesia Propofol 70mg IV  Procedure Details   After informed consent was obtained for the procedure, with all risks and benefits of procedure explained the patient was taken to the endoscopy suite and placed in the left lateral decubitus position. Following sequential administration of sedation as per above, the [id number] gastroscope was inserted into the mouth and advanced under direct vision to second portion of the duodenum. A careful inspection was made as the gastroscope was withdrawn, including a retroflexed view of the proximal stomach; findings and interventions are described below. Findings:    -Whitish plaques in esophagus, suggestive of candidiasis; brushings obtained  -Normal mucosa at gastroesophageal junction; biopsied  -Mild gastric antral erythema with erosions; biopsied  -Normal duodenal mucosa    Therapies:  biopsy of esophagus; biopsy of stomach; esophageal brushing for fungus  Specimens: #1 gastric; #2 g-e junction; Esophageal brushing for fungus  EBL:  None. Complications:   None; patient tolerated the procedure well.            Impression:    -Whitish plaques in esophagus, suggestive of candidiasis; brushings obtained  -Normal mucosa at gastroesophageal junction; biopsied  -Mild gastric antral erythema with erosions; biopsied  -Normal duodenal mucosa    Recommendations:  -Await pathology. , -Follow symptoms.     Discharge disposition:  Home in the company of  when able to ambulate    Reed Palmer MD

## 2021-11-29 NOTE — PROGRESS NOTES
Endoscope was pre-cleaned at bedside immediately following procedure by SELMA Miguel Glasses and cell phone returned to patient immediately post-procedure.

## 2021-11-29 NOTE — ROUTINE PROCESS
Pema Shield  1955  [id number]    Situation:  Verbal report received from: Martín Alba  Procedure: Procedure(s):  ESOPHAGOGASTRODUODENOSCOPY (EGD)  ESOPHAGOGASTRODUODENAL (EGD) BIOPSY  ENDOSCOPIC BRUSHING    Background:    Preoperative diagnosis: INTRACTABLE HICCUPS, VOMITING BILE  Postoperative diagnosis: Gastritis,     :  Dr. Shama French  Assistant(s): Endoscopy Technician-1: Clayton Huber RN-1: Sun Villela    Specimens:   ID Type Source Tests Collected by Time Destination   1 : Reece Cheatham Preservative Gastric  Blessing Hernandez MD 11/29/2021 1019 Pathology   2 : Reece Cheatham Preservative Esophagus  Blessing Hernandez MD 11/29/2021 1024 Pathology   1 : Esophageal Brushing  Fresh Esophageal Brushing  Blessing Hernandez MD 11/29/2021 1025 Cytology     H. Pylori  no    Assessment:  Intra-procedure medications       Anesthesia gave intra-procedure sedation and medications, see anesthesia flow sheet yes    Intravenous fluids: NS@ KVO     Vital signs stable     Abdominal assessment: round and soft     Recommendation:  Discharge patient per MD order. Family or Friend   Permission to share finding with family or friend yes    Endoscopy discharge instructions have been reviewed and given to caregiver. The caregiver verbalized understanding and acceptance of instructions.

## 2021-11-29 NOTE — PROGRESS NOTES
Patient's caregiver provided current medication list from [facility]. Patient is currently taking clopidogrel and carvedilol- no dates on last administration were provided and patient is unable to answer last time he received either of these medications. [facility] contacted for updated dates on these medications. Per staff at facility, patient last took clopidogrel yesterday morning on 11/28/2021 and last took carvedilol yesterday evening on 11/28/2021. Dr. Yvette Coyle updated about last dose of clopidogrel being given yesterday- per Dr. Yvette Coyle it is okay to proceed with scheduled procedure today. Patient receives tri-weekly dialysis, IV placed and using a 250ml bag of NS with a micro-dripper at this time for scheduled procedure.

## 2021-11-29 NOTE — H&P
Gastroenterology Outpatient History and Physical    Patient: Anusha Bell    Physician: Debbe Duane, MD    Chief Complaint: hiccupping and emesis  History of Present Illness: 67yo M with hiccupping and emesis. Nl GES except for delayed clearance from esophagus. History:  Past Medical History:   Diagnosis Date    Coronary artery disease involving native coronary artery of native heart without angina pectoris 11/18/2021    Cath from [facility] 6/20/17 for abnormal stress test LM ok LAD  prox with benny from RCA and distal LAD occluded D1 prox 70 D2 prox 90 Circ pMLI OM1 50%  RCA large, mid 40    Diabetes ([facility]     Hypertension     Stroke ([facility] 5/16/2003      Past Surgical History:   Procedure Laterality Date    HX ORTHOPAEDIC  7/20/2010    Bunion and toe repair left foot.  HX ORTHOPAEDIC      Plate in left hip. Social History     Socioeconomic History    Marital status: LEGALLY    Tobacco Use    Smoking status: Never Smoker   Substance and Sexual Activity    Alcohol use: No    Drug use: Yes     Types: Prescription    No family history on file. Patient Active Problem List   Diagnosis Code    Respiratory failure ([facility] J96.90    Anemia in chronic kidney disease N18.9, D63.1    Diabetes mellitus due to underlying condition with diabetic nephropathy (HCC) E08.21    End stage renal disease ([facility] N18.6    Coronary artery disease involving native coronary artery of native heart without angina pectoris I25.10    NSTEMI (non-ST elevated myocardial infarction) ([facility] T76.1    Systolic CHF, acute on chronic (HCC) I50.23       Allergies: Allergies   Allergen Reactions    Adhesive Tape-Silicones Rash     Medications:   Prior to Admission medications    Medication Sig Start Date End Date Taking? Authorizing Provider   albuterol (PROVENTIL HFA, VENTOLIN HFA, PROAIR HFA) 90 mcg/actuation inhaler Take 2 Puffs by inhalation every four (4) hours as needed for Shortness of Breath for up to 14 days. 11/21/21 12/5/21  Jeremiah Toledo MD   carvediloL (COREG) 6.25 mg tablet Take 1 Tablet by mouth two (2) times daily (with meals). 11/21/21   Jeremiah Toledo MD   hydrALAZINE (APRESOLINE) 25 mg tablet Take 1 Tablet by mouth three (3) times daily. 11/21/21   Jeremiah Toledo MD   isosorbide dinitrate (ISORDIL) 10 mg tablet Take 1 Tablet by mouth three (3) times daily. 11/21/21   Jeremiah Toledo MD   amoxicillin-clavulanate (Augmentin) 875-125 mg per tablet Take 1 Tablet by mouth daily. Give after dialysis on 11/21/21   Jeremiah Toledo MD   sevelamer carbonate (Renvela) 0.8 gram pwpk oral powder 0.8 g three (3) times daily (with meals). 5/19/21   Libertad Watt MD   atorvastatin (LIPITOR) 40 mg tablet 40 mg nightly. 11/1/21   Libertad Watt MD   azithromycin (ZITHROMAX) 250 mg tablet 250 mg. 11/13/21   Libertad Watt MD   Combigan 0.2-0.5 % drop ophthalmic solution Administer 1 Drop to left eye every twelve (12) hours. Glaucoma 11/1/21   Libertad Watt MD   chlorproMAZINE (THORAZINE) 50 mg tablet 50 mg three (3) times daily. 11/8/21   Libertad Watt MD   clopidogreL (PLAVIX) 75 mg tab 75 mg daily. 11/7/21   Libertad Watt MD   Glutose-15 40 % oral gel  10/21/21   Libertad Watt MD   fluconazole (DIFLUCAN) 100 mg tablet 100 mg Every Tues, Thur & Sat. For Candidiasis, Unspecified 11/5/21   Libertad Watt MD   gabapentin (NEURONTIN) 100 mg capsule 100 mg nightly. 10/26/21   Libertad Watt MD   insulin lispro (HUMALOG) 100 unit/mL injection by SubCUTAneous route Before breakfast, lunch, dinner and at bedtime. Inject per sliding scale, 0-200= 0; 201-250= 2 units; 251-300= 4 units; 301-350= 6 units; 351-400= 8 units; 401-999= 10 units. Greater than or equal to 401, give 10 units and CALL MD/NP, subcutaneously before meals and at bedtime for DM. 11/15/21   Other, MD Libertad   latanoprost (XALATAN) 0.005 % ophthalmic solution Administer 1 Drop to left eye nightly.  Unspecified Glaucoma 10/26/21   Other, MD Libertad   omeprazole (PRILOSEC) 40 mg capsule 40 mg daily. 11/1/21   Libertad Watt MD   sertraline (ZOLOFT) 50 mg tablet 50 mg daily. 11/1/21   Libertad Watt MD   tamsulosin (FLOMAX) 0.4 mg capsule 0.4 mg. 11/1/21   Libertad Watt MD   glucose blood VI test strips (blood glucose test) strip by Does Not Apply route See Admin Instructions. Libertad Watt MD   Doxepin 3 mg tab Take 3 mg by mouth nightly. Libertad Watt MD   glucagon (Glucagon Emergency Kit, human,) 1 mg solr by Injection route. Inject 1 gram, intramuscularly as needed for Hypoglycemia Episode Give 1 gram for Blood sugar <60. NOTIFY MD.    Libertad Watt MD   multivitamin (ONE A DAY) tablet Take 1 Tablet by mouth daily. Libertad Watt MD   senna (Senna) 8.6 mg tablet Take 1 Tablet by mouth nightly. Libertad Watt MD   sodium chloride (OCEAN) 0.65 % nasal squeeze bottle 1 [place] by Both Nostrils route three (3) times daily as needed for Congestion. Libertad Watt MD   acetaminophen (TylenoL) 325 mg tablet Take 650 mg by mouth every four (4) hours as needed for Pain. Libertad Watt MD   ascorbic acid, vitamin C, (Vitamin C) 500 mg tablet Take 500 mg by mouth two (2) times a day. Libertad Watt MD   ergocalciferol (Vitamin D2) 1,250 mcg (50,000 unit) capsule Take 50,000 Units by mouth every seven (7) days. EVERY Monday. Libertad Watt MD   zinc sulfate (ZINCATE) 50 mg zinc (220 mg) capsule Take 1 Capsule by mouth daily. Libertad Watt MD   aspirin 81 mg tablet Take 81 mg by mouth daily. Libertad Watt MD     Physical Exam:   Vital Signs: There were no vitals taken for this visit.   General: well developed, well nourished   HEENT: unremarkable   Heart: regular rhythm no mumur    Lungs: clear   Abdominal:  benign   Neurological: unremarkable   Extremities: no edema     Findings/Diagnosis: hiccupping and emesis  Plan of Care/Planned Procedure: EGD with conscious/deep sedation    Signed:  Belen Gamboa MD 11/29/2021

## 2021-12-02 NOTE — ED PROVIDER NOTES
Mr. Narayan Pate is a 69yo male who presents to the ER from dialysis with an irregular heart rate in \"low blood pressures. \"  The patient has no complaints. He said that he feels well. Per EMS, the patient's blood pressure was 144 systolic. He had received most of his full run of dialysis. He did have it stopped 20 minutes early. The patient said he has hiccups now, which is normal after his dialysis. He denies any chest pain or trouble breathing. No nausea or vomiting. No changes with his urine or bowel movements. He denies any other complaints. Past Medical History:   Diagnosis Date    Anemia     Coronary artery disease involving native coronary artery of native heart without angina pectoris 11/18/2021    [facility] 6/20/17 for abnormal stress test LM ok LAD  prox with benny from RCA and distal LAD occluded D1 prox 70 D2 prox 90 Circ pMLI OM1 50%  RCA large, mid 36    Depression     Diabetes ([address]     Dysphagia     GERD (gastroesophageal reflux disease)     Hyperlipidemia     Hypertension     Nausea & vomiting     Stroke ([address] 5/16/2003       Past Surgical History:   Procedure Laterality Date    HX AMPUTATION FOOT Bilateral     HX ORTHOPAEDIC  7/20/2010    Bunion and toe repair left foot.  HX ORTHOPAEDIC      Plate in left hip.  UPPER GI ENDOSCOPY,BIOPSY  11/29/2021         UPPER GI ENDOSCOPY,DIAGNOSIS  11/29/2021              No family history on file.     Social History     Socioeconomic History    Marital status: LEGALLY      Spouse name: Not on file    Number of children: Not on file    Years of education: Not on file    Highest education level: Not on file   Occupational History    Not on file   Tobacco Use    Smoking status: Never Smoker    Smokeless tobacco: Not on file   Substance and Sexual Activity    Alcohol use: No    Drug use: Yes     Types: Prescription    Sexual activity: Not on file   Other Topics Concern    Not on file   Social History Narrative    Not on file     Social Determinants of Health     Financial Resource Strain:     Difficulty of Paying Living Expenses: Not on file   Food Insecurity:     Worried About Running Out of Food in the Last Year: Not on file    Amy of Food in the Last Year: Not on file   Transportation Needs:     Lack of Transportation (Medical): Not on file    Lack of Transportation (Non-Medical): Not on file   Physical Activity:     Days of Exercise per Week: Not on file    Minutes of Exercise per Session: Not on file   Stress:     Feeling of Stress : Not on file   Social Connections:     Frequency of Communication with Friends and Family: Not on file    Frequency of Social Gatherings with Friends and Family: Not on file    Attends Zoroastrian Services: Not on file    Active Member of [address] [place] [address] or Organizations: Not on file    Attends Club or Organization Meetings: Not on file    Marital Status: Not on file   Intimate Partner Violence:     Fear of Current or Ex-Partner: Not on file    Emotionally Abused: Not on file    Physically Abused: Not on file    Sexually Abused: Not on file   Housing Stability:     Unable to Pay for Housing in the Last Year: Not on file    Number of [place] in the Last Year: Not on file    Unstable Housing in the Last Year: Not on file         ALLERGIES: Adhesive tape-silicones    Review of Systems   Constitutional: Negative for chills and fever. HENT: Negative for rhinorrhea and sore throat. Respiratory: Negative for cough and shortness of breath. Cardiovascular: Negative for chest pain. Gastrointestinal: Negative for abdominal pain, diarrhea, nausea and vomiting. Hiccups     Genitourinary: Negative for dysuria and hematuria. Musculoskeletal: Negative for arthralgias and myalgias. Skin: Negative for pallor and rash. Neurological: Negative for dizziness, weakness and light-headedness. All other systems reviewed and are negative.       There were no vitals filed for this visit. Physical Exam     Vital signs reviewed. Nursing notes reviewed. Const:  No acute distress, well developed, well nourished  Head:  Atraumatic, normocephalic  Eyes:  PERRL, conjunctiva normal, no scleral icterus  Neck:  Supple, trachea midline  Cardiovascular:  Regular rate, irregular  Resp:  No resp distress, no increased work of breathing  Abd:  Soft, non-tender, non-distended  MSK: Bilateral BKA's  Neuro:  Alert and oriented x3, no cranial nerve defect  Skin:  Warm, dry, intact  Psych: normal mood and affect, behavior is normal, judgement and thought content is normal          MDM  Number of Diagnoses or Management Options     Amount and/or Complexity of Data Reviewed  Clinical lab tests: ordered and reviewed  Review and summarize past medical records: yes    Patient Progress  Patient progress: stable          EKG interpretation: (Preliminary)  Rhythm: sinus rhythm ; and irregular. Rate (approx.): 96; Axis: normal; P wave: normal; QRS interval: normal ; ST/T wave: non-specific changes; Other findings: borderline ekg. Ms. Holly Hernandez is a 67yo male who presents to the ER from dialysis with an abnormal heart rate. He is completely asymptomatic in the ER. He was sent in for concerns for possible afib. The EKG computer read the EKG as afib as well. However, he clearly has p-waves. It seems like he has sinus rhythm with sinus arrhythmia, PVCs and possible PACs. That would explain his irregularity. Pt. Instructed to f/u with his PCP in the next 2 days or return to the ER with new or worsening sx.       Procedures

## 2021-12-03 NOTE — ED NOTES
The patient left the Emergency Department via [address] [place] [address] Team, alert and oriented and in no acute distress. The patient was encouraged to call or return to the ED for worsening issues or problems and was encouraged to schedule a follow up appointment for continuing care.      The patient verbalized understanding of discharge instructions and all questions were answered. The patient has no further concerns at this time.

## 2021-12-03 NOTE — PROGRESS NOTES
Date of previous inpatient admission/ ED visit?  11/16/21-11/21/21 NSTEMI    What brought the patient back to ED? During dialysis he reportedly had low blood pressure with irregular heart     Did patient decline recommended services during last admission/ ED visit (if yes, what)? N/A    Has patient seen a provider since their last inpatient admission/ED visit (if yes, when)? N/A    PCP: First and Last name:   Name of Practice:    Are you a current patient: Yes/No:    Approximate date of last visit:    TEJINDER Interventions:    CM received call from ED requesting transport be set up for discharge back to [facility] [phone]. CM placed call to [facility], spoke with Inés. Stretcher transport ETA 3 hour, reference # [id number].     Wang Mike RN, BSN, [facility]  ED Care Management  [phone]

## 2021-12-03 NOTE — ED NOTES
Verbal shift change report given to Brenda Hu RN (oncoming nurse) by Marce Caro RN (offgoing nurse). Report included the following information SBAR, ED Summary, MAR and Recent Results.

## 2022-01-01 ENCOUNTER — APPOINTMENT (OUTPATIENT)
Dept: CT IMAGING | Age: 67
DRG: 130 | End: 2022-01-01
Attending: NURSE PRACTITIONER
Payer: MEDICAID

## 2022-01-01 ENCOUNTER — APPOINTMENT (OUTPATIENT)
Dept: GENERAL RADIOLOGY | Age: 67
DRG: 130 | End: 2022-01-01
Attending: NURSE PRACTITIONER
Payer: MEDICAID

## 2022-01-01 ENCOUNTER — APPOINTMENT (OUTPATIENT)
Dept: NON INVASIVE DIAGNOSTICS | Age: 67
DRG: 130 | End: 2022-01-01
Attending: NURSE PRACTITIONER
Payer: MEDICAID

## 2022-01-01 ENCOUNTER — APPOINTMENT (OUTPATIENT)
Dept: MRI IMAGING | Age: 67
DRG: 130 | End: 2022-01-01
Attending: NURSE PRACTITIONER
Payer: MEDICAID

## 2022-01-01 ENCOUNTER — APPOINTMENT (OUTPATIENT)
Dept: GENERAL RADIOLOGY | Age: 67
DRG: 130 | End: 2022-01-01
Payer: MEDICAID

## 2022-01-01 ENCOUNTER — HOSPITAL ENCOUNTER (INPATIENT)
Age: 67
LOS: 12 days | DRG: 130 | End: 2022-01-20
Attending: EMERGENCY MEDICINE | Admitting: ANESTHESIOLOGY
Payer: MEDICAID

## 2022-01-01 ENCOUNTER — APPOINTMENT (OUTPATIENT)
Dept: GENERAL RADIOLOGY | Age: 67
DRG: 130 | End: 2022-01-01
Attending: ANESTHESIOLOGY
Payer: MEDICAID

## 2022-01-01 ENCOUNTER — APPOINTMENT (OUTPATIENT)
Dept: GENERAL RADIOLOGY | Age: 67
DRG: 130 | End: 2022-01-01
Attending: EMERGENCY MEDICINE
Payer: MEDICAID

## 2022-01-01 ENCOUNTER — APPOINTMENT (OUTPATIENT)
Dept: CT IMAGING | Age: 67
DRG: 130 | End: 2022-01-01
Attending: EMERGENCY MEDICINE
Payer: MEDICAID

## 2022-01-01 ENCOUNTER — APPOINTMENT (OUTPATIENT)
Dept: GENERAL RADIOLOGY | Age: 67
DRG: 130 | End: 2022-01-01
Attending: INTERNAL MEDICINE
Payer: MEDICAID

## 2022-01-01 ENCOUNTER — HOSPICE ADMISSION (OUTPATIENT)
Dept: HOSPICE | Facility: HOSPICE | Age: 67
End: 2022-01-01

## 2022-01-01 VITALS
SYSTOLIC BLOOD PRESSURE: 54 MMHG | TEMPERATURE: 97.1 F | HEIGHT: 75 IN | WEIGHT: 152.56 LBS | BODY MASS INDEX: 18.97 KG/M2 | OXYGEN SATURATION: 96 % | DIASTOLIC BLOOD PRESSURE: 30 MMHG | RESPIRATION RATE: 15 BRPM | HEART RATE: 40 BPM

## 2022-01-01 DIAGNOSIS — R40.1 OBTUNDED: ICD-10-CM

## 2022-01-01 DIAGNOSIS — I67.82 CHRONIC CEREBRAL ISCHEMIA: ICD-10-CM

## 2022-01-01 DIAGNOSIS — G45.0 VERTEBROBASILAR INSUFFICIENCY: Primary | ICD-10-CM

## 2022-01-01 DIAGNOSIS — Z51.5 PALLIATIVE CARE ENCOUNTER: ICD-10-CM

## 2022-01-01 DIAGNOSIS — G93.40 ACUTE ENCEPHALOPATHY: ICD-10-CM

## 2022-01-01 DIAGNOSIS — R41.82 ALTERED MENTAL STATUS, UNSPECIFIED ALTERED MENTAL STATUS TYPE: ICD-10-CM

## 2022-01-01 DIAGNOSIS — R41.89 UNRESPONSIVE: ICD-10-CM

## 2022-01-01 DIAGNOSIS — Z71.89 GOALS OF CARE, COUNSELING/DISCUSSION: ICD-10-CM

## 2022-01-01 DIAGNOSIS — N18.6 END STAGE RENAL DISEASE (HCC): ICD-10-CM

## 2022-01-01 DIAGNOSIS — R06.02 SHORTNESS OF BREATH: ICD-10-CM

## 2022-01-01 DIAGNOSIS — J96.90 RESPIRATORY FAILURE, UNSPECIFIED CHRONICITY, UNSPECIFIED WHETHER WITH HYPOXIA OR HYPERCAPNIA (HCC): ICD-10-CM

## 2022-01-01 LAB
ALBUMIN SERPL-MCNC: 2.7 G/DL (ref 3.5–5)
ALBUMIN SERPL-MCNC: 2.8 G/DL (ref 3.5–5)
ALBUMIN SERPL-MCNC: 3 G/DL (ref 3.5–5)
ALBUMIN/GLOB SERPL: 0.4 {RATIO} (ref 1.1–2.2)
ALBUMIN/GLOB SERPL: 0.6 {RATIO} (ref 1.1–2.2)
ALBUMIN/GLOB SERPL: 0.7 {RATIO} (ref 1.1–2.2)
ALP SERPL-CCNC: 66 U/L (ref 45–117)
ALP SERPL-CCNC: 69 U/L (ref 45–117)
ALP SERPL-CCNC: 79 U/L (ref 45–117)
ALT SERPL-CCNC: 13 U/L (ref 12–78)
ALT SERPL-CCNC: 13 U/L (ref 12–78)
ALT SERPL-CCNC: 22 U/L (ref 12–78)
ANION GAP SERPL CALC-SCNC: 10 MMOL/L (ref 5–15)
ANION GAP SERPL CALC-SCNC: 11 MMOL/L (ref 5–15)
ANION GAP SERPL CALC-SCNC: 12 MMOL/L (ref 5–15)
ANION GAP SERPL CALC-SCNC: 7 MMOL/L (ref 5–15)
ANION GAP SERPL CALC-SCNC: 8 MMOL/L (ref 5–15)
ANION GAP SERPL CALC-SCNC: 9 MMOL/L (ref 5–15)
APTT PPP: 31 SEC (ref 22.1–31)
ARTERIAL PATENCY WRIST A: POSITIVE
ARTERIAL PATENCY WRIST A: YES
ASPIRIN TEST, ASPIRN: 423 ARU
AST SERPL-CCNC: 11 U/L (ref 15–37)
AST SERPL-CCNC: 13 U/L (ref 15–37)
AST SERPL-CCNC: 20 U/L (ref 15–37)
ATRIAL RATE: 80 BPM
BASE EXCESS BLD CALC-SCNC: 5.2 MMOL/L
BASE EXCESS BLDA CALC-SCNC: 7 MMOL/L
BASOPHILS # BLD: 0 K/UL (ref 0–0.1)
BASOPHILS # BLD: 0 K/UL (ref 0–0.1)
BASOPHILS NFR BLD: 0 % (ref 0–1)
BASOPHILS NFR BLD: 0 % (ref 0–1)
BDY SITE: ABNORMAL
BDY SITE: ABNORMAL
BILIRUB SERPL-MCNC: 0.4 MG/DL (ref 0.2–1)
BUN SERPL-MCNC: 26 MG/DL (ref 6–20)
BUN SERPL-MCNC: 26 MG/DL (ref 6–20)
BUN SERPL-MCNC: 29 MG/DL (ref 6–20)
BUN SERPL-MCNC: 29 MG/DL (ref 6–20)
BUN SERPL-MCNC: 32 MG/DL (ref 6–20)
BUN SERPL-MCNC: 33 MG/DL (ref 6–20)
BUN SERPL-MCNC: 37 MG/DL (ref 6–20)
BUN SERPL-MCNC: 39 MG/DL (ref 6–20)
BUN SERPL-MCNC: 44 MG/DL (ref 6–20)
BUN SERPL-MCNC: 46 MG/DL (ref 6–20)
BUN SERPL-MCNC: 48 MG/DL (ref 6–20)
BUN SERPL-MCNC: 52 MG/DL (ref 6–20)
BUN SERPL-MCNC: 66 MG/DL (ref 6–20)
BUN SERPL-MCNC: 81 MG/DL (ref 6–20)
BUN/CREAT SERPL: 12 (ref 12–20)
BUN/CREAT SERPL: 4 (ref 12–20)
BUN/CREAT SERPL: 5 (ref 12–20)
BUN/CREAT SERPL: 5 (ref 12–20)
BUN/CREAT SERPL: 6 (ref 12–20)
BUN/CREAT SERPL: 7 (ref 12–20)
BUN/CREAT SERPL: 8 (ref 12–20)
BUN/CREAT SERPL: 9 (ref 12–20)
CALCIUM SERPL-MCNC: 10 MG/DL (ref 8.5–10.1)
CALCIUM SERPL-MCNC: 10.1 MG/DL (ref 8.5–10.1)
CALCIUM SERPL-MCNC: 10.3 MG/DL (ref 8.5–10.1)
CALCIUM SERPL-MCNC: 9.2 MG/DL (ref 8.5–10.1)
CALCIUM SERPL-MCNC: 9.2 MG/DL (ref 8.5–10.1)
CALCIUM SERPL-MCNC: 9.3 MG/DL (ref 8.5–10.1)
CALCIUM SERPL-MCNC: 9.4 MG/DL (ref 8.5–10.1)
CALCIUM SERPL-MCNC: 9.6 MG/DL (ref 8.5–10.1)
CALCIUM SERPL-MCNC: 9.6 MG/DL (ref 8.5–10.1)
CALCIUM SERPL-MCNC: 9.9 MG/DL (ref 8.5–10.1)
CALCULATED P AXIS, ECG09: 40 DEGREES
CALCULATED R AXIS, ECG10: -45 DEGREES
CALCULATED T AXIS, ECG11: 124 DEGREES
CHLORIDE SERPL-SCNC: 100 MMOL/L (ref 97–108)
CHLORIDE SERPL-SCNC: 101 MMOL/L (ref 97–108)
CHLORIDE SERPL-SCNC: 103 MMOL/L (ref 97–108)
CHLORIDE SERPL-SCNC: 97 MMOL/L (ref 97–108)
CHLORIDE SERPL-SCNC: 97 MMOL/L (ref 97–108)
CHLORIDE SERPL-SCNC: 98 MMOL/L (ref 97–108)
CHLORIDE SERPL-SCNC: 99 MMOL/L (ref 97–108)
CHLORIDE SERPL-SCNC: 99 MMOL/L (ref 97–108)
CHOLEST SERPL-MCNC: 101 MG/DL
CO2 SERPL-SCNC: 25 MMOL/L (ref 21–32)
CO2 SERPL-SCNC: 26 MMOL/L (ref 21–32)
CO2 SERPL-SCNC: 27 MMOL/L (ref 21–32)
CO2 SERPL-SCNC: 28 MMOL/L (ref 21–32)
CO2 SERPL-SCNC: 29 MMOL/L (ref 21–32)
CO2 SERPL-SCNC: 30 MMOL/L (ref 21–32)
CO2 SERPL-SCNC: 31 MMOL/L (ref 21–32)
CO2 SERPL-SCNC: 31 MMOL/L (ref 21–32)
COMMENT, HOLDF: NORMAL
COMMENT, HOLDF: NORMAL
COVID-19 RAPID TEST, COVR: DETECTED
CREAT SERPL-MCNC: 4.91 MG/DL (ref 0.7–1.3)
CREAT SERPL-MCNC: 5.06 MG/DL (ref 0.7–1.3)
CREAT SERPL-MCNC: 5.82 MG/DL (ref 0.7–1.3)
CREAT SERPL-MCNC: 6.43 MG/DL (ref 0.7–1.3)
CREAT SERPL-MCNC: 6.66 MG/DL (ref 0.7–1.3)
CREAT SERPL-MCNC: 6.8 MG/DL (ref 0.7–1.3)
CREAT SERPL-MCNC: 6.83 MG/DL (ref 0.7–1.3)
CREAT SERPL-MCNC: 6.84 MG/DL (ref 0.7–1.3)
CREAT SERPL-MCNC: 7.13 MG/DL (ref 0.7–1.3)
CREAT SERPL-MCNC: 7.16 MG/DL (ref 0.7–1.3)
CREAT SERPL-MCNC: 7.61 MG/DL (ref 0.7–1.3)
CREAT SERPL-MCNC: 8.01 MG/DL (ref 0.7–1.3)
CREAT SERPL-MCNC: 8.09 MG/DL (ref 0.7–1.3)
CREAT SERPL-MCNC: 8.45 MG/DL (ref 0.7–1.3)
CRP SERPL-MCNC: 2.61 MG/DL (ref 0–0.6)
DIAGNOSIS, 93000: NORMAL
DIFFERENTIAL METHOD BLD: ABNORMAL
DIFFERENTIAL METHOD BLD: ABNORMAL
ECHO AO ROOT DIAM: 3.8 CM
ECHO AO ROOT INDEX: 1.92 CM/M2
ECHO AR MAX VEL PISA: 3.2 M/S
ECHO AV AREA PEAK VELOCITY: 3.4 CM2
ECHO AV AREA PEAK VELOCITY: 3.4 CM2
ECHO AV PEAK GRADIENT: 4 MMHG
ECHO AV PEAK VELOCITY: 1 M/S
ECHO AV REGURGITANT PHT: 2605.8 MILLISECOND
ECHO AV VELOCITY RATIO: 0.9
ECHO LA DIAMETER INDEX: 1.72 CM/M2
ECHO LA DIAMETER: 3.4 CM
ECHO LA TO AORTIC ROOT RATIO: 0.89
ECHO LV E' LATERAL VELOCITY: 8 CM/S
ECHO LV E' SEPTAL VELOCITY: 8 CM/S
ECHO LV EDV A2C: 201 ML
ECHO LV EDV A4C: 162 ML
ECHO LV EDV BP: 188 ML (ref 67–155)
ECHO LV EDV BP: 188 ML (ref 67–155)
ECHO LV EDV INDEX A4C: 82 ML/M2
ECHO LV EDV NDEX A2C: 102 ML/M2
ECHO LV EJECTION FRACTION A2C: 20 %
ECHO LV EJECTION FRACTION A4C: 8 %
ECHO LV EJECTION FRACTION BIPLANE: 16 % (ref 55–100)
ECHO LV EJECTION FRACTION BIPLANE: 16 % (ref 55–100)
ECHO LV ESV A2C: 160 ML
ECHO LV ESV A4C: 149 ML
ECHO LV ESV BP: 158 ML (ref 22–58)
ECHO LV ESV INDEX A2C: 81 ML/M2
ECHO LV ESV INDEX A4C: 75 ML/M2
ECHO LV ESV INDEX BP: 80 ML/M2
ECHO LV FRACTIONAL SHORTENING: 7 % (ref 28–44)
ECHO LV INTERNAL DIMENSION DIASTOLE INDEX: 3.03 CM/M2
ECHO LV INTERNAL DIMENSION DIASTOLIC: 6 CM (ref 4.2–5.9)
ECHO LV INTERNAL DIMENSION SYSTOLIC INDEX: 2.83 CM/M2
ECHO LV INTERNAL DIMENSION SYSTOLIC: 5.6 CM
ECHO LV IVSD: 0.9 CM (ref 0.6–1)
ECHO LV MASS 2D: 296.6 G (ref 88–224)
ECHO LV MASS INDEX 2D: 149.8 G/M2 (ref 49–115)
ECHO LV POSTERIOR WALL DIASTOLIC: 1.4 CM (ref 0.6–1)
ECHO LV RELATIVE WALL THICKNESS RATIO: 0.47
ECHO LVOT AREA: 3.8 CM2
ECHO LVOT DIAM: 2.2 CM
ECHO LVOT PEAK GRADIENT: 3 MMHG
ECHO LVOT PEAK VELOCITY: 0.9 M/S
ECHO MV A VELOCITY: 0.83 M/S
ECHO MV AREA PHT: 7.4 CM2
ECHO MV E DECELERATION TIME (DT): 102.9 MS
ECHO MV E VELOCITY: 0.65 M/S
ECHO MV E/A RATIO: 0.78
ECHO MV E/E' LATERAL: 8.13
ECHO MV E/E' RATIO (AVERAGED): 8.13
ECHO MV E/E' SEPTAL: 8.13
ECHO MV PRESSURE HALF TIME (PHT): 29.9 MS
ECHO PV MAX VELOCITY: 0.9 M/S
ECHO PV PEAK GRADIENT: 3 MMHG
ECHO RV INTERNAL DIMENSION: 2.4 CM
ECHO RV TAPSE: 1.5 CM (ref 1.5–2)
ECHO TV REGURGITANT MAX VELOCITY: 2.06 M/S
ECHO TV REGURGITANT PEAK GRADIENT: 17 MMHG
EOSINOPHIL # BLD: 0.3 K/UL (ref 0–0.4)
EOSINOPHIL # BLD: 0.3 K/UL (ref 0–0.4)
EOSINOPHIL NFR BLD: 3 % (ref 0–7)
EOSINOPHIL NFR BLD: 5 % (ref 0–7)
ERYTHROCYTE [DISTWIDTH] IN BLOOD BY AUTOMATED COUNT: 15.4 % (ref 11.5–14.5)
ERYTHROCYTE [DISTWIDTH] IN BLOOD BY AUTOMATED COUNT: 16 % (ref 11.5–14.5)
ERYTHROCYTE [DISTWIDTH] IN BLOOD BY AUTOMATED COUNT: 16.4 % (ref 11.5–14.5)
ERYTHROCYTE [DISTWIDTH] IN BLOOD BY AUTOMATED COUNT: 16.7 % (ref 11.5–14.5)
ERYTHROCYTE [DISTWIDTH] IN BLOOD BY AUTOMATED COUNT: 16.7 % (ref 11.5–14.5)
ERYTHROCYTE [DISTWIDTH] IN BLOOD BY AUTOMATED COUNT: 16.8 % (ref 11.5–14.5)
ERYTHROCYTE [DISTWIDTH] IN BLOOD BY AUTOMATED COUNT: 16.9 % (ref 11.5–14.5)
ERYTHROCYTE [DISTWIDTH] IN BLOOD BY AUTOMATED COUNT: 17 % (ref 11.5–14.5)
ERYTHROCYTE [DISTWIDTH] IN BLOOD BY AUTOMATED COUNT: 17.1 % (ref 11.5–14.5)
ERYTHROCYTE [DISTWIDTH] IN BLOOD BY AUTOMATED COUNT: 17.2 % (ref 11.5–14.5)
ERYTHROCYTE [DISTWIDTH] IN BLOOD BY AUTOMATED COUNT: 17.3 % (ref 11.5–14.5)
ERYTHROCYTE [DISTWIDTH] IN BLOOD BY AUTOMATED COUNT: 17.3 % (ref 11.5–14.5)
ERYTHROCYTE [DISTWIDTH] IN BLOOD BY AUTOMATED COUNT: 17.4 % (ref 11.5–14.5)
EST. AVERAGE GLUCOSE BLD GHB EST-MCNC: 131 MG/DL
GAS FLOW.O2 SETTING OXYMISER: 16 BPM
GLOBULIN SER CALC-MCNC: 4.4 G/DL (ref 2–4)
GLOBULIN SER CALC-MCNC: 4.5 G/DL (ref 2–4)
GLOBULIN SER CALC-MCNC: 6.3 G/DL (ref 2–4)
GLUCOSE BLD STRIP.AUTO-MCNC: 104 MG/DL (ref 65–117)
GLUCOSE BLD STRIP.AUTO-MCNC: 115 MG/DL (ref 65–117)
GLUCOSE BLD STRIP.AUTO-MCNC: 125 MG/DL (ref 65–117)
GLUCOSE BLD STRIP.AUTO-MCNC: 125 MG/DL (ref 65–117)
GLUCOSE BLD STRIP.AUTO-MCNC: 129 MG/DL (ref 65–117)
GLUCOSE BLD STRIP.AUTO-MCNC: 133 MG/DL (ref 65–117)
GLUCOSE BLD STRIP.AUTO-MCNC: 135 MG/DL (ref 65–117)
GLUCOSE BLD STRIP.AUTO-MCNC: 139 MG/DL (ref 65–117)
GLUCOSE BLD STRIP.AUTO-MCNC: 144 MG/DL (ref 65–117)
GLUCOSE BLD STRIP.AUTO-MCNC: 161 MG/DL (ref 65–117)
GLUCOSE BLD STRIP.AUTO-MCNC: 164 MG/DL (ref 65–117)
GLUCOSE BLD STRIP.AUTO-MCNC: 167 MG/DL (ref 65–117)
GLUCOSE BLD STRIP.AUTO-MCNC: 167 MG/DL (ref 65–117)
GLUCOSE BLD STRIP.AUTO-MCNC: 169 MG/DL (ref 65–117)
GLUCOSE BLD STRIP.AUTO-MCNC: 180 MG/DL (ref 65–117)
GLUCOSE BLD STRIP.AUTO-MCNC: 184 MG/DL (ref 65–117)
GLUCOSE BLD STRIP.AUTO-MCNC: 186 MG/DL (ref 65–117)
GLUCOSE BLD STRIP.AUTO-MCNC: 187 MG/DL (ref 65–117)
GLUCOSE BLD STRIP.AUTO-MCNC: 197 MG/DL (ref 65–117)
GLUCOSE BLD STRIP.AUTO-MCNC: 208 MG/DL (ref 65–117)
GLUCOSE BLD STRIP.AUTO-MCNC: 212 MG/DL (ref 65–117)
GLUCOSE BLD STRIP.AUTO-MCNC: 218 MG/DL (ref 65–117)
GLUCOSE BLD STRIP.AUTO-MCNC: 219 MG/DL (ref 65–117)
GLUCOSE BLD STRIP.AUTO-MCNC: 224 MG/DL (ref 65–117)
GLUCOSE BLD STRIP.AUTO-MCNC: 232 MG/DL (ref 65–117)
GLUCOSE BLD STRIP.AUTO-MCNC: 233 MG/DL (ref 65–117)
GLUCOSE BLD STRIP.AUTO-MCNC: 242 MG/DL (ref 65–117)
GLUCOSE BLD STRIP.AUTO-MCNC: 244 MG/DL (ref 65–117)
GLUCOSE BLD STRIP.AUTO-MCNC: 252 MG/DL (ref 65–117)
GLUCOSE BLD STRIP.AUTO-MCNC: 258 MG/DL (ref 65–117)
GLUCOSE BLD STRIP.AUTO-MCNC: 260 MG/DL (ref 65–117)
GLUCOSE BLD STRIP.AUTO-MCNC: 263 MG/DL (ref 65–117)
GLUCOSE BLD STRIP.AUTO-MCNC: 269 MG/DL (ref 65–117)
GLUCOSE BLD STRIP.AUTO-MCNC: 269 MG/DL (ref 65–117)
GLUCOSE BLD STRIP.AUTO-MCNC: 271 MG/DL (ref 65–117)
GLUCOSE BLD STRIP.AUTO-MCNC: 272 MG/DL (ref 65–117)
GLUCOSE BLD STRIP.AUTO-MCNC: 277 MG/DL (ref 65–117)
GLUCOSE BLD STRIP.AUTO-MCNC: 278 MG/DL (ref 65–117)
GLUCOSE BLD STRIP.AUTO-MCNC: 288 MG/DL (ref 65–117)
GLUCOSE BLD STRIP.AUTO-MCNC: 295 MG/DL (ref 65–117)
GLUCOSE BLD STRIP.AUTO-MCNC: 295 MG/DL (ref 65–117)
GLUCOSE BLD STRIP.AUTO-MCNC: 301 MG/DL (ref 65–117)
GLUCOSE BLD STRIP.AUTO-MCNC: 301 MG/DL (ref 65–117)
GLUCOSE BLD STRIP.AUTO-MCNC: 304 MG/DL (ref 65–117)
GLUCOSE BLD STRIP.AUTO-MCNC: 307 MG/DL (ref 65–117)
GLUCOSE BLD STRIP.AUTO-MCNC: 337 MG/DL (ref 65–117)
GLUCOSE BLD STRIP.AUTO-MCNC: 67 MG/DL (ref 65–117)
GLUCOSE BLD STRIP.AUTO-MCNC: 72 MG/DL (ref 65–117)
GLUCOSE BLD STRIP.AUTO-MCNC: 89 MG/DL (ref 65–117)
GLUCOSE BLD STRIP.AUTO-MCNC: 89 MG/DL (ref 65–117)
GLUCOSE BLD STRIP.AUTO-MCNC: 90 MG/DL (ref 65–117)
GLUCOSE BLD STRIP.AUTO-MCNC: 91 MG/DL (ref 65–117)
GLUCOSE BLD STRIP.AUTO-MCNC: 94 MG/DL (ref 65–117)
GLUCOSE SERPL-MCNC: 109 MG/DL (ref 65–100)
GLUCOSE SERPL-MCNC: 139 MG/DL (ref 65–100)
GLUCOSE SERPL-MCNC: 160 MG/DL (ref 65–100)
GLUCOSE SERPL-MCNC: 187 MG/DL (ref 65–100)
GLUCOSE SERPL-MCNC: 188 MG/DL (ref 65–100)
GLUCOSE SERPL-MCNC: 193 MG/DL (ref 65–100)
GLUCOSE SERPL-MCNC: 212 MG/DL (ref 65–100)
GLUCOSE SERPL-MCNC: 228 MG/DL (ref 65–100)
GLUCOSE SERPL-MCNC: 233 MG/DL (ref 65–100)
GLUCOSE SERPL-MCNC: 256 MG/DL (ref 65–100)
GLUCOSE SERPL-MCNC: 269 MG/DL (ref 65–100)
GLUCOSE SERPL-MCNC: 274 MG/DL (ref 65–100)
GLUCOSE SERPL-MCNC: 307 MG/DL (ref 65–100)
GLUCOSE SERPL-MCNC: 64 MG/DL (ref 65–100)
HBA1C MFR BLD: 6.2 % (ref 4–5.6)
HBV SURFACE AB SER QL: REACTIVE
HBV SURFACE AB SER-ACNC: 76.73 MIU/ML
HBV SURFACE AG SER QL: <0.1 INDEX
HBV SURFACE AG SER QL: NEGATIVE
HCO3 BLD-SCNC: 30.8 MMOL/L (ref 22–26)
HCO3 BLDA-SCNC: 31 MMOL/L (ref 22–26)
HCT VFR BLD AUTO: 29.9 % (ref 36.6–50.3)
HCT VFR BLD AUTO: 30.3 % (ref 36.6–50.3)
HCT VFR BLD AUTO: 30.6 % (ref 36.6–50.3)
HCT VFR BLD AUTO: 30.7 % (ref 36.6–50.3)
HCT VFR BLD AUTO: 31.8 % (ref 36.6–50.3)
HCT VFR BLD AUTO: 32.3 % (ref 36.6–50.3)
HCT VFR BLD AUTO: 32.3 % (ref 36.6–50.3)
HCT VFR BLD AUTO: 32.7 % (ref 36.6–50.3)
HCT VFR BLD AUTO: 33.1 % (ref 36.6–50.3)
HCT VFR BLD AUTO: 33.1 % (ref 36.6–50.3)
HCT VFR BLD AUTO: 33.6 % (ref 36.6–50.3)
HCT VFR BLD AUTO: 34.2 % (ref 36.6–50.3)
HCT VFR BLD AUTO: 36.1 % (ref 36.6–50.3)
HDLC SERPL-MCNC: 38 MG/DL
HDLC SERPL: 2.7 {RATIO} (ref 0–5)
HGB BLD-MCNC: 10 G/DL (ref 12.1–17)
HGB BLD-MCNC: 10.3 G/DL (ref 12.1–17)
HGB BLD-MCNC: 10.5 G/DL (ref 12.1–17)
HGB BLD-MCNC: 10.7 G/DL (ref 12.1–17)
HGB BLD-MCNC: 10.7 G/DL (ref 12.1–17)
HGB BLD-MCNC: 10.8 G/DL (ref 12.1–17)
HGB BLD-MCNC: 10.8 G/DL (ref 12.1–17)
HGB BLD-MCNC: 11.2 G/DL (ref 12.1–17)
HGB BLD-MCNC: 9.6 G/DL (ref 12.1–17)
HGB BLD-MCNC: 9.6 G/DL (ref 12.1–17)
HGB BLD-MCNC: 9.9 G/DL (ref 12.1–17)
IMM GRANULOCYTES # BLD AUTO: 0 K/UL (ref 0–0.04)
IMM GRANULOCYTES # BLD AUTO: 0.1 K/UL (ref 0–0.04)
IMM GRANULOCYTES NFR BLD AUTO: 0 % (ref 0–0.5)
IMM GRANULOCYTES NFR BLD AUTO: 1 % (ref 0–0.5)
INR PPP: 1.1 (ref 0.9–1.1)
INR PPP: 1.1 (ref 0.9–1.1)
LDLC SERPL CALC-MCNC: 47.4 MG/DL (ref 0–100)
LYMPHOCYTES # BLD: 0.8 K/UL (ref 0.8–3.5)
LYMPHOCYTES # BLD: 0.8 K/UL (ref 0.8–3.5)
LYMPHOCYTES NFR BLD: 10 % (ref 12–49)
LYMPHOCYTES NFR BLD: 14 % (ref 12–49)
MAGNESIUM SERPL-MCNC: 2.3 MG/DL (ref 1.6–2.4)
MAGNESIUM SERPL-MCNC: 2.4 MG/DL (ref 1.6–2.4)
MAGNESIUM SERPL-MCNC: 2.5 MG/DL (ref 1.6–2.4)
MAGNESIUM SERPL-MCNC: 2.5 MG/DL (ref 1.6–2.4)
MAGNESIUM SERPL-MCNC: 2.6 MG/DL (ref 1.6–2.4)
MAGNESIUM SERPL-MCNC: 2.9 MG/DL (ref 1.6–2.4)
MCH RBC QN AUTO: 29.1 PG (ref 26–34)
MCH RBC QN AUTO: 29.2 PG (ref 26–34)
MCH RBC QN AUTO: 29.5 PG (ref 26–34)
MCH RBC QN AUTO: 29.5 PG (ref 26–34)
MCH RBC QN AUTO: 29.6 PG (ref 26–34)
MCH RBC QN AUTO: 29.6 PG (ref 26–34)
MCH RBC QN AUTO: 29.8 PG (ref 26–34)
MCH RBC QN AUTO: 30.7 PG (ref 26–34)
MCH RBC QN AUTO: 31.6 PG (ref 26–34)
MCH RBC QN AUTO: 32.4 PG (ref 26–34)
MCH RBC QN AUTO: 32.7 PG (ref 26–34)
MCHC RBC AUTO-ENTMCNC: 30.6 G/DL (ref 30–36.5)
MCHC RBC AUTO-ENTMCNC: 31 G/DL (ref 30–36.5)
MCHC RBC AUTO-ENTMCNC: 31.1 G/DL (ref 30–36.5)
MCHC RBC AUTO-ENTMCNC: 31.3 G/DL (ref 30–36.5)
MCHC RBC AUTO-ENTMCNC: 31.4 G/DL (ref 30–36.5)
MCHC RBC AUTO-ENTMCNC: 31.6 G/DL (ref 30–36.5)
MCHC RBC AUTO-ENTMCNC: 32.1 G/DL (ref 30–36.5)
MCHC RBC AUTO-ENTMCNC: 32.3 G/DL (ref 30–36.5)
MCHC RBC AUTO-ENTMCNC: 32.5 G/DL (ref 30–36.5)
MCHC RBC AUTO-ENTMCNC: 32.5 G/DL (ref 30–36.5)
MCHC RBC AUTO-ENTMCNC: 33.1 G/DL (ref 30–36.5)
MCHC RBC AUTO-ENTMCNC: 33.6 G/DL (ref 30–36.5)
MCHC RBC AUTO-ENTMCNC: 34.7 G/DL (ref 30–36.5)
MCV RBC AUTO: 91 FL (ref 80–99)
MCV RBC AUTO: 92.2 FL (ref 80–99)
MCV RBC AUTO: 92.8 FL (ref 80–99)
MCV RBC AUTO: 93 FL (ref 80–99)
MCV RBC AUTO: 93.4 FL (ref 80–99)
MCV RBC AUTO: 94.4 FL (ref 80–99)
MCV RBC AUTO: 95 FL (ref 80–99)
MCV RBC AUTO: 95.3 FL (ref 80–99)
MCV RBC AUTO: 95.5 FL (ref 80–99)
MCV RBC AUTO: 95.7 FL (ref 80–99)
MCV RBC AUTO: 96.4 FL (ref 80–99)
MONOCYTES # BLD: 0.3 K/UL (ref 0–1)
MONOCYTES # BLD: 0.5 K/UL (ref 0–1)
MONOCYTES NFR BLD: 6 % (ref 5–13)
MONOCYTES NFR BLD: 6 % (ref 5–13)
NEUTS SEG # BLD: 3.9 K/UL (ref 1.8–8)
NEUTS SEG # BLD: 6.8 K/UL (ref 1.8–8)
NEUTS SEG NFR BLD: 74 % (ref 32–75)
NEUTS SEG NFR BLD: 81 % (ref 32–75)
NRBC # BLD: 0 K/UL (ref 0–0.01)
NRBC BLD-RTO: 0 PER 100 WBC
O2/TOTAL GAS SETTING VFR VENT: 100 %
P-R INTERVAL, ECG05: 178 MS
P2Y12 PLT RESPONSE,PPPR: 254 PRU (ref 194–418)
PCO2 BLD: 49.5 MMHG (ref 35–45)
PCO2 BLDA: 42 MMHG (ref 35–45)
PEEP RESPIRATORY: 5 CM[H2O]
PEEP RESPIRATORY: 8 CMH2O
PH BLD: 7.4 [PH] (ref 7.35–7.45)
PH BLDA: 7.48 [PH] (ref 7.35–7.45)
PHOSPHATE SERPL-MCNC: 1.6 MG/DL (ref 2.6–4.7)
PHOSPHATE SERPL-MCNC: 1.7 MG/DL (ref 2.6–4.7)
PHOSPHATE SERPL-MCNC: 2.4 MG/DL (ref 2.6–4.7)
PHOSPHATE SERPL-MCNC: 2.8 MG/DL (ref 2.6–4.7)
PHOSPHATE SERPL-MCNC: 3.2 MG/DL (ref 2.6–4.7)
PHOSPHATE SERPL-MCNC: 3.6 MG/DL (ref 2.6–4.7)
PHOSPHATE SERPL-MCNC: 3.9 MG/DL (ref 2.6–4.7)
PLATELET # BLD AUTO: 118 K/UL (ref 150–400)
PLATELET # BLD AUTO: 141 K/UL (ref 150–400)
PLATELET # BLD AUTO: 194 K/UL (ref 150–400)
PLATELET # BLD AUTO: 197 K/UL (ref 150–400)
PLATELET # BLD AUTO: 204 K/UL (ref 150–400)
PLATELET # BLD AUTO: 209 K/UL (ref 150–400)
PLATELET # BLD AUTO: 215 K/UL (ref 150–400)
PLATELET # BLD AUTO: 220 K/UL (ref 150–400)
PLATELET # BLD AUTO: 222 K/UL (ref 150–400)
PLATELET # BLD AUTO: 223 K/UL (ref 150–400)
PLATELET # BLD AUTO: 238 K/UL (ref 150–400)
PLATELET # BLD AUTO: 253 K/UL (ref 150–400)
PLATELET # BLD AUTO: 297 K/UL (ref 150–400)
PMV BLD AUTO: 10.5 FL (ref 8.9–12.9)
PMV BLD AUTO: 10.8 FL (ref 8.9–12.9)
PMV BLD AUTO: 10.9 FL (ref 8.9–12.9)
PMV BLD AUTO: 10.9 FL (ref 8.9–12.9)
PMV BLD AUTO: 11 FL (ref 8.9–12.9)
PMV BLD AUTO: 11.1 FL (ref 8.9–12.9)
PMV BLD AUTO: 11.2 FL (ref 8.9–12.9)
PMV BLD AUTO: 11.2 FL (ref 8.9–12.9)
PMV BLD AUTO: 11.3 FL (ref 8.9–12.9)
PMV BLD AUTO: 12.4 FL (ref 8.9–12.9)
PO2 BLD: 477 MMHG (ref 80–100)
PO2 BLDA: 81 MMHG (ref 80–100)
POTASSIUM SERPL-SCNC: 3.2 MMOL/L (ref 3.5–5.1)
POTASSIUM SERPL-SCNC: 3.4 MMOL/L (ref 3.5–5.1)
POTASSIUM SERPL-SCNC: 3.6 MMOL/L (ref 3.5–5.1)
POTASSIUM SERPL-SCNC: 3.6 MMOL/L (ref 3.5–5.1)
POTASSIUM SERPL-SCNC: 3.7 MMOL/L (ref 3.5–5.1)
POTASSIUM SERPL-SCNC: 3.8 MMOL/L (ref 3.5–5.1)
POTASSIUM SERPL-SCNC: 3.9 MMOL/L (ref 3.5–5.1)
POTASSIUM SERPL-SCNC: 3.9 MMOL/L (ref 3.5–5.1)
POTASSIUM SERPL-SCNC: 4 MMOL/L (ref 3.5–5.1)
POTASSIUM SERPL-SCNC: 4.1 MMOL/L (ref 3.5–5.1)
PROCALCITONIN SERPL-MCNC: 0.24 NG/ML
PROCALCITONIN SERPL-MCNC: 0.28 NG/ML
PROT SERPL-MCNC: 7.2 G/DL (ref 6.4–8.2)
PROT SERPL-MCNC: 7.5 G/DL (ref 6.4–8.2)
PROT SERPL-MCNC: 9 G/DL (ref 6.4–8.2)
PROTHROMBIN TIME: 11.2 SEC (ref 9–11.1)
PROTHROMBIN TIME: 11.3 SEC (ref 9–11.1)
Q-T INTERVAL, ECG07: 426 MS
QRS DURATION, ECG06: 114 MS
QTC CALCULATION (BEZET), ECG08: 491 MS
RBC # BLD AUTO: 3.13 M/UL (ref 4.1–5.7)
RBC # BLD AUTO: 3.21 M/UL (ref 4.1–5.7)
RBC # BLD AUTO: 3.24 M/UL (ref 4.1–5.7)
RBC # BLD AUTO: 3.3 M/UL (ref 4.1–5.7)
RBC # BLD AUTO: 3.3 M/UL (ref 4.1–5.7)
RBC # BLD AUTO: 3.42 M/UL (ref 4.1–5.7)
RBC # BLD AUTO: 3.43 M/UL (ref 4.1–5.7)
RBC # BLD AUTO: 3.46 M/UL (ref 4.1–5.7)
RBC # BLD AUTO: 3.55 M/UL (ref 4.1–5.7)
RBC # BLD AUTO: 3.59 M/UL (ref 4.1–5.7)
RBC # BLD AUTO: 3.62 M/UL (ref 4.1–5.7)
RBC # BLD AUTO: 3.66 M/UL (ref 4.1–5.7)
RBC # BLD AUTO: 3.8 M/UL (ref 4.1–5.7)
RBC MORPH BLD: ABNORMAL
SAMPLES BEING HELD,HOLD: NORMAL
SAMPLES BEING HELD,HOLD: NORMAL
SAO2 % BLD: 100 % (ref 92–97)
SAO2 % BLD: 97 % (ref 92–97)
SAO2% DEVICE SAO2% SENSOR NAME: ABNORMAL
SERVICE CMNT-IMP: ABNORMAL
SERVICE CMNT-IMP: NORMAL
SODIUM SERPL-SCNC: 135 MMOL/L (ref 136–145)
SODIUM SERPL-SCNC: 136 MMOL/L (ref 136–145)
SODIUM SERPL-SCNC: 137 MMOL/L (ref 136–145)
SODIUM SERPL-SCNC: 138 MMOL/L (ref 136–145)
SODIUM SERPL-SCNC: 139 MMOL/L (ref 136–145)
SODIUM SERPL-SCNC: 140 MMOL/L (ref 136–145)
SOURCE, COVRS: ABNORMAL
SPECIMEN SITE: ABNORMAL
SPECIMEN TYPE: ABNORMAL
THERAPEUTIC RANGE,PTTT: NORMAL SECS (ref 58–77)
TRIGL SERPL-MCNC: 78 MG/DL (ref ?–150)
TROPONIN-HIGH SENSITIVITY: 139 NG/L (ref 0–76)
TSH SERPL DL<=0.05 MIU/L-ACNC: 1.28 UIU/ML (ref 0.36–3.74)
VENTILATION MODE VENT: ABNORMAL
VENTRICULAR RATE, ECG03: 80 BPM
VLDLC SERPL CALC-MCNC: 15.6 MG/DL
VT SETTING VENT: 400 ML
WBC # BLD AUTO: 10.1 K/UL (ref 4.1–11.1)
WBC # BLD AUTO: 10.5 K/UL (ref 4.1–11.1)
WBC # BLD AUTO: 14 K/UL (ref 4.1–11.1)
WBC # BLD AUTO: 5.4 K/UL (ref 4.1–11.1)
WBC # BLD AUTO: 7 K/UL (ref 4.1–11.1)
WBC # BLD AUTO: 7.6 K/UL (ref 4.1–11.1)
WBC # BLD AUTO: 7.9 K/UL (ref 4.1–11.1)
WBC # BLD AUTO: 8.5 K/UL (ref 4.1–11.1)
WBC # BLD AUTO: 8.5 K/UL (ref 4.1–11.1)
WBC # BLD AUTO: 9 K/UL (ref 4.1–11.1)
WBC # BLD AUTO: 9.2 K/UL (ref 4.1–11.1)
WBC # BLD AUTO: 9.5 K/UL (ref 4.1–11.1)
WBC # BLD AUTO: 9.6 K/UL (ref 4.1–11.1)

## 2022-01-01 PROCEDURE — 74011000258 HC RX REV CODE- 258: Performed by: PSYCHIATRY & NEUROLOGY

## 2022-01-01 PROCEDURE — 82962 GLUCOSE BLOOD TEST: CPT

## 2022-01-01 PROCEDURE — 74011000250 HC RX REV CODE- 250: Performed by: ANESTHESIOLOGY

## 2022-01-01 PROCEDURE — 74011000250 HC RX REV CODE- 250: Performed by: INTERNAL MEDICINE

## 2022-01-01 PROCEDURE — 83735 ASSAY OF MAGNESIUM: CPT

## 2022-01-01 PROCEDURE — 74011636637 HC RX REV CODE- 636/637: Performed by: ANESTHESIOLOGY

## 2022-01-01 PROCEDURE — 74011250636 HC RX REV CODE- 250/636: Performed by: PSYCHIATRY & NEUROLOGY

## 2022-01-01 PROCEDURE — 74011250637 HC RX REV CODE- 250/637: Performed by: NURSE PRACTITIONER

## 2022-01-01 PROCEDURE — 99233 SBSQ HOSP IP/OBS HIGH 50: CPT | Performed by: PSYCHIATRY & NEUROLOGY

## 2022-01-01 PROCEDURE — 80048 BASIC METABOLIC PNL TOTAL CA: CPT

## 2022-01-01 PROCEDURE — 84100 ASSAY OF PHOSPHORUS: CPT

## 2022-01-01 PROCEDURE — 94003 VENT MGMT INPAT SUBQ DAY: CPT

## 2022-01-01 PROCEDURE — 74011000250 HC RX REV CODE- 250: Performed by: NURSE PRACTITIONER

## 2022-01-01 PROCEDURE — 95816 EEG AWAKE AND DROWSY: CPT | Performed by: PSYCHIATRY & NEUROLOGY

## 2022-01-01 PROCEDURE — 74011250636 HC RX REV CODE- 250/636: Performed by: EMERGENCY MEDICINE

## 2022-01-01 PROCEDURE — 74011250636 HC RX REV CODE- 250/636: Performed by: ANESTHESIOLOGY

## 2022-01-01 PROCEDURE — 36415 COLL VENOUS BLD VENIPUNCTURE: CPT

## 2022-01-01 PROCEDURE — 99284 EMERGENCY DEPT VISIT MOD MDM: CPT

## 2022-01-01 PROCEDURE — 74011636637 HC RX REV CODE- 636/637: Performed by: NURSE PRACTITIONER

## 2022-01-01 PROCEDURE — 77030018798 HC PMP KT ENTRL FED COVD -A

## 2022-01-01 PROCEDURE — 86706 HEP B SURFACE ANTIBODY: CPT

## 2022-01-01 PROCEDURE — 5A1D70Z PERFORMANCE OF URINARY FILTRATION, INTERMITTENT, LESS THAN 6 HOURS PER DAY: ICD-10-PCS | Performed by: INTERNAL MEDICINE

## 2022-01-01 PROCEDURE — 74011636637 HC RX REV CODE- 636/637: Performed by: INTERNAL MEDICINE

## 2022-01-01 PROCEDURE — 74018 RADEX ABDOMEN 1 VIEW: CPT

## 2022-01-01 PROCEDURE — 65610000006 HC RM INTENSIVE CARE

## 2022-01-01 PROCEDURE — 74011250637 HC RX REV CODE- 250/637: Performed by: INTERNAL MEDICINE

## 2022-01-01 PROCEDURE — 84145 PROCALCITONIN (PCT): CPT

## 2022-01-01 PROCEDURE — 87635 SARS-COV-2 COVID-19 AMP PRB: CPT

## 2022-01-01 PROCEDURE — APPNB30 APP NON BILLABLE TIME 0-30 MINS: Performed by: NURSE PRACTITIONER

## 2022-01-01 PROCEDURE — 74011250636 HC RX REV CODE- 250/636: Performed by: INTERNAL MEDICINE

## 2022-01-01 PROCEDURE — 0BH17EZ INSERTION OF ENDOTRACHEAL AIRWAY INTO TRACHEA, VIA NATURAL OR ARTIFICIAL OPENING: ICD-10-PCS | Performed by: EMERGENCY MEDICINE

## 2022-01-01 PROCEDURE — 85027 COMPLETE CBC AUTOMATED: CPT

## 2022-01-01 PROCEDURE — 31500 INSERT EMERGENCY AIRWAY: CPT

## 2022-01-01 PROCEDURE — 36600 WITHDRAWAL OF ARTERIAL BLOOD: CPT

## 2022-01-01 PROCEDURE — 74011000250 HC RX REV CODE- 250

## 2022-01-01 PROCEDURE — 99233 SBSQ HOSP IP/OBS HIGH 50: CPT | Performed by: PHYSICAL MEDICINE & REHABILITATION

## 2022-01-01 PROCEDURE — 90935 HEMODIALYSIS ONE EVALUATION: CPT

## 2022-01-01 PROCEDURE — 74011250636 HC RX REV CODE- 250/636: Performed by: NURSE PRACTITIONER

## 2022-01-01 PROCEDURE — 4A03X5D MEASUREMENT OF ARTERIAL FLOW, INTRACRANIAL, EXTERNAL APPROACH: ICD-10-PCS | Performed by: RADIOLOGY

## 2022-01-01 PROCEDURE — 94640 AIRWAY INHALATION TREATMENT: CPT

## 2022-01-01 PROCEDURE — 5A1955Z RESPIRATORY VENTILATION, GREATER THAN 96 CONSECUTIVE HOURS: ICD-10-PCS | Performed by: EMERGENCY MEDICINE

## 2022-01-01 PROCEDURE — 80053 COMPREHEN METABOLIC PANEL: CPT

## 2022-01-01 PROCEDURE — P9045 ALBUMIN (HUMAN), 5%, 250 ML: HCPCS | Performed by: ANESTHESIOLOGY

## 2022-01-01 PROCEDURE — 77030037877 HC DRSG MEPILEX >48IN BORD MOLN -A

## 2022-01-01 PROCEDURE — 85025 COMPLETE CBC W/AUTO DIFF WBC: CPT

## 2022-01-01 PROCEDURE — 85576 BLOOD PLATELET AGGREGATION: CPT

## 2022-01-01 PROCEDURE — 93005 ELECTROCARDIOGRAM TRACING: CPT

## 2022-01-01 PROCEDURE — 71045 X-RAY EXAM CHEST 1 VIEW: CPT

## 2022-01-01 PROCEDURE — 95816 EEG AWAKE AND DROWSY: CPT | Performed by: ANESTHESIOLOGY

## 2022-01-01 PROCEDURE — 99223 1ST HOSP IP/OBS HIGH 75: CPT | Performed by: PHYSICAL MEDICINE & REHABILITATION

## 2022-01-01 PROCEDURE — 94762 N-INVAS EAR/PLS OXIMTRY CONT: CPT

## 2022-01-01 PROCEDURE — 86140 C-REACTIVE PROTEIN: CPT

## 2022-01-01 PROCEDURE — C8929 TTE W OR WO FOL WCON,DOPPLER: HCPCS

## 2022-01-01 PROCEDURE — 85610 PROTHROMBIN TIME: CPT

## 2022-01-01 PROCEDURE — 77030040704 HC NSL TU RETAIN SYS BRDL PRO AMR -B

## 2022-01-01 PROCEDURE — 93306 TTE W/DOPPLER COMPLETE: CPT | Performed by: SPECIALIST

## 2022-01-01 PROCEDURE — 94002 VENT MGMT INPAT INIT DAY: CPT

## 2022-01-01 PROCEDURE — 84443 ASSAY THYROID STIM HORMONE: CPT

## 2022-01-01 PROCEDURE — 77030040922 HC BLNKT HYPOTHRM STRY -A

## 2022-01-01 PROCEDURE — 80061 LIPID PANEL: CPT

## 2022-01-01 PROCEDURE — C9254 INJECTION, LACOSAMIDE: HCPCS | Performed by: PSYCHIATRY & NEUROLOGY

## 2022-01-01 PROCEDURE — 77030004950 HC CATH ENTRL NG COVD -A

## 2022-01-01 PROCEDURE — 95816 EEG AWAKE AND DROWSY: CPT | Performed by: NURSE PRACTITIONER

## 2022-01-01 PROCEDURE — 0042T CT CODE NEURO PERF W CBF: CPT

## 2022-01-01 PROCEDURE — 82803 BLOOD GASES ANY COMBINATION: CPT

## 2022-01-01 PROCEDURE — 87340 HEPATITIS B SURFACE AG IA: CPT

## 2022-01-01 PROCEDURE — 94664 DEMO&/EVAL PT USE INHALER: CPT

## 2022-01-01 PROCEDURE — 74011000636 HC RX REV CODE- 636: Performed by: RADIOLOGY

## 2022-01-01 PROCEDURE — 83036 HEMOGLOBIN GLYCOSYLATED A1C: CPT

## 2022-01-01 PROCEDURE — 70551 MRI BRAIN STEM W/O DYE: CPT

## 2022-01-01 PROCEDURE — 70450 CT HEAD/BRAIN W/O DYE: CPT

## 2022-01-01 PROCEDURE — 74011250636 HC RX REV CODE- 250/636

## 2022-01-01 PROCEDURE — 84484 ASSAY OF TROPONIN QUANT: CPT

## 2022-01-01 PROCEDURE — 51798 US URINE CAPACITY MEASURE: CPT

## 2022-01-01 PROCEDURE — 70496 CT ANGIOGRAPHY HEAD: CPT

## 2022-01-01 PROCEDURE — P9047 ALBUMIN (HUMAN), 25%, 50ML: HCPCS | Performed by: INTERNAL MEDICINE

## 2022-01-01 PROCEDURE — 74011250637 HC RX REV CODE- 250/637: Performed by: HOSPITALIST

## 2022-01-01 PROCEDURE — 85730 THROMBOPLASTIN TIME PARTIAL: CPT

## 2022-01-01 PROCEDURE — 74011250637 HC RX REV CODE- 250/637: Performed by: ANESTHESIOLOGY

## 2022-01-01 RX ORDER — CARVEDILOL 6.25 MG/1
6.25 TABLET ORAL 2 TIMES DAILY WITH MEALS
Status: DISCONTINUED | OUTPATIENT
Start: 2022-01-01 | End: 2022-01-01

## 2022-01-01 RX ORDER — ALBUMIN HUMAN 50 G/1000ML
25 SOLUTION INTRAVENOUS ONCE
Status: COMPLETED | OUTPATIENT
Start: 2022-01-01 | End: 2022-01-01

## 2022-01-01 RX ORDER — ONDANSETRON 4 MG/1
4 TABLET, ORALLY DISINTEGRATING ORAL
Status: DISCONTINUED | OUTPATIENT
Start: 2022-01-01 | End: 2022-01-01 | Stop reason: HOSPADM

## 2022-01-01 RX ORDER — ACETYLCYSTEINE 200 MG/ML
400 SOLUTION ORAL; RESPIRATORY (INHALATION)
Status: DISCONTINUED | OUTPATIENT
Start: 2022-01-01 | End: 2022-01-01

## 2022-01-01 RX ORDER — LISINOPRIL 5 MG/1
5 TABLET ORAL DAILY
Status: DISCONTINUED | OUTPATIENT
Start: 2022-01-01 | End: 2022-01-01 | Stop reason: HOSPADM

## 2022-01-01 RX ORDER — LORAZEPAM 2 MG/ML
2 INJECTION INTRAMUSCULAR ONCE
Status: COMPLETED | OUTPATIENT
Start: 2022-01-01 | End: 2022-01-01

## 2022-01-01 RX ORDER — PROPOFOL 10 MG/ML
INJECTION, EMULSION INTRAVENOUS
Status: DISCONTINUED
Start: 2022-01-01 | End: 2022-01-01 | Stop reason: WASHOUT

## 2022-01-01 RX ORDER — CLOPIDOGREL BISULFATE 75 MG/1
150 TABLET ORAL ONCE
Status: COMPLETED | OUTPATIENT
Start: 2022-01-01 | End: 2022-01-01

## 2022-01-01 RX ORDER — CARVEDILOL 6.25 MG/1
6.25 TABLET ORAL 2 TIMES DAILY WITH MEALS
Status: DISCONTINUED | OUTPATIENT
Start: 2022-01-01 | End: 2022-01-01 | Stop reason: HOSPADM

## 2022-01-01 RX ORDER — ASPIRIN 300 MG/1
600 SUPPOSITORY RECTAL ONCE
Status: COMPLETED | OUTPATIENT
Start: 2022-01-01 | End: 2022-01-01

## 2022-01-01 RX ORDER — MAGNESIUM SULFATE 100 %
4 CRYSTALS MISCELLANEOUS AS NEEDED
Status: DISCONTINUED | OUTPATIENT
Start: 2022-01-01 | End: 2022-01-01 | Stop reason: HOSPADM

## 2022-01-01 RX ORDER — ROCURONIUM BROMIDE 10 MG/ML
INJECTION, SOLUTION INTRAVENOUS
Status: COMPLETED
Start: 2022-01-01 | End: 2022-01-01

## 2022-01-01 RX ORDER — INSULIN GLARGINE 100 [IU]/ML
25 INJECTION, SOLUTION SUBCUTANEOUS EVERY 12 HOURS
Status: DISCONTINUED | OUTPATIENT
Start: 2022-01-01 | End: 2022-01-01 | Stop reason: HOSPADM

## 2022-01-01 RX ORDER — FENTANYL CITRATE 50 UG/ML
INJECTION, SOLUTION INTRAMUSCULAR; INTRAVENOUS
Status: DISCONTINUED
Start: 2022-01-01 | End: 2022-01-01 | Stop reason: HOSPADM

## 2022-01-01 RX ORDER — INSULIN GLARGINE 100 [IU]/ML
5 INJECTION, SOLUTION SUBCUTANEOUS DAILY
Status: DISCONTINUED | OUTPATIENT
Start: 2022-01-01 | End: 2022-01-01

## 2022-01-01 RX ORDER — GLYCOPYRROLATE 0.2 MG/ML
0.2 INJECTION INTRAMUSCULAR; INTRAVENOUS
Status: DISCONTINUED | OUTPATIENT
Start: 2022-01-01 | End: 2022-01-01 | Stop reason: HOSPADM

## 2022-01-01 RX ORDER — CLOPIDOGREL BISULFATE 75 MG/1
75 TABLET ORAL DAILY
Status: DISCONTINUED | OUTPATIENT
Start: 2022-01-01 | End: 2022-01-01 | Stop reason: HOSPADM

## 2022-01-01 RX ORDER — CLOPIDOGREL BISULFATE 75 MG/1
75 TABLET ORAL DAILY
Status: DISCONTINUED | OUTPATIENT
Start: 2022-01-01 | End: 2022-01-01

## 2022-01-01 RX ORDER — SODIUM CHLORIDE 0.9 % (FLUSH) 0.9 %
5-40 SYRINGE (ML) INJECTION AS NEEDED
Status: DISCONTINUED | OUTPATIENT
Start: 2022-01-01 | End: 2022-01-01 | Stop reason: HOSPADM

## 2022-01-01 RX ORDER — ATORVASTATIN CALCIUM 40 MG/1
40 TABLET, FILM COATED ORAL
Status: DISCONTINUED | OUTPATIENT
Start: 2022-01-01 | End: 2022-01-01

## 2022-01-01 RX ORDER — INSULIN LISPRO 100 [IU]/ML
INJECTION, SOLUTION INTRAVENOUS; SUBCUTANEOUS EVERY 6 HOURS
Status: DISCONTINUED | OUTPATIENT
Start: 2022-01-01 | End: 2022-01-01

## 2022-01-01 RX ORDER — ALBUTEROL SULFATE 0.83 MG/ML
2.5 SOLUTION RESPIRATORY (INHALATION)
Status: DISCONTINUED | OUTPATIENT
Start: 2022-01-01 | End: 2022-01-01 | Stop reason: HOSPADM

## 2022-01-01 RX ORDER — PROPOFOL 10 MG/ML
0-50 VIAL (ML) INTRAVENOUS
Status: DISCONTINUED | OUTPATIENT
Start: 2022-01-01 | End: 2022-01-01

## 2022-01-01 RX ORDER — FENTANYL CITRATE 50 UG/ML
100 INJECTION, SOLUTION INTRAMUSCULAR; INTRAVENOUS ONCE
Status: COMPLETED | OUTPATIENT
Start: 2022-01-01 | End: 2022-01-01

## 2022-01-01 RX ORDER — INSULIN GLARGINE 100 [IU]/ML
5 INJECTION, SOLUTION SUBCUTANEOUS ONCE
Status: COMPLETED | OUTPATIENT
Start: 2022-01-01 | End: 2022-01-01

## 2022-01-01 RX ORDER — HEPARIN SODIUM 5000 [USP'U]/ML
5000 INJECTION, SOLUTION INTRAVENOUS; SUBCUTANEOUS EVERY 8 HOURS
Status: DISCONTINUED | OUTPATIENT
Start: 2022-01-01 | End: 2022-01-01 | Stop reason: HOSPADM

## 2022-01-01 RX ORDER — ACETYLCYSTEINE 200 MG/ML
400 SOLUTION ORAL; RESPIRATORY (INHALATION) 3 TIMES DAILY
Status: DISCONTINUED | OUTPATIENT
Start: 2022-01-01 | End: 2022-01-01

## 2022-01-01 RX ORDER — INSULIN GLARGINE 100 [IU]/ML
10 INJECTION, SOLUTION SUBCUTANEOUS
Status: DISCONTINUED | OUTPATIENT
Start: 2022-01-01 | End: 2022-01-01

## 2022-01-01 RX ORDER — HEPARIN SODIUM 5000 [USP'U]/ML
5000 INJECTION, SOLUTION INTRAVENOUS; SUBCUTANEOUS EVERY 12 HOURS
Status: DISCONTINUED | OUTPATIENT
Start: 2022-01-01 | End: 2022-01-01

## 2022-01-01 RX ORDER — ENOXAPARIN SODIUM 100 MG/ML
40 INJECTION SUBCUTANEOUS DAILY
Status: DISCONTINUED | OUTPATIENT
Start: 2022-01-01 | End: 2022-01-01

## 2022-01-01 RX ORDER — HYDROMORPHONE HYDROCHLORIDE 1 MG/ML
1-2 INJECTION, SOLUTION INTRAMUSCULAR; INTRAVENOUS; SUBCUTANEOUS
Status: DISCONTINUED | OUTPATIENT
Start: 2022-01-01 | End: 2022-01-01 | Stop reason: HOSPADM

## 2022-01-01 RX ORDER — ASPIRIN 300 MG/1
600 SUPPOSITORY RECTAL DAILY
Status: DISCONTINUED | OUTPATIENT
Start: 2022-01-01 | End: 2022-01-01

## 2022-01-01 RX ORDER — DEXTROSE 50 % IN WATER (D50W) INTRAVENOUS SYRINGE
25-50 AS NEEDED
Status: DISCONTINUED | OUTPATIENT
Start: 2022-01-01 | End: 2022-01-01 | Stop reason: HOSPADM

## 2022-01-01 RX ORDER — ISOSORBIDE DINITRATE 10 MG/1
10 TABLET ORAL 3 TIMES DAILY
Status: DISCONTINUED | OUTPATIENT
Start: 2022-01-01 | End: 2022-01-01

## 2022-01-01 RX ORDER — INSULIN GLARGINE 100 [IU]/ML
10 INJECTION, SOLUTION SUBCUTANEOUS DAILY
Status: DISCONTINUED | OUTPATIENT
Start: 2022-01-01 | End: 2022-01-01

## 2022-01-01 RX ORDER — ALBUMIN HUMAN 250 G/1000ML
25 SOLUTION INTRAVENOUS AS NEEDED
Status: DISCONTINUED | OUTPATIENT
Start: 2022-01-01 | End: 2022-01-01 | Stop reason: HOSPADM

## 2022-01-01 RX ORDER — EPINEPHRINE 0.1 MG/ML
INJECTION INTRACARDIAC; INTRAVENOUS
Status: DISCONTINUED
Start: 2022-01-01 | End: 2022-01-01 | Stop reason: HOSPADM

## 2022-01-01 RX ORDER — INSULIN GLARGINE 100 [IU]/ML
10 INJECTION, SOLUTION SUBCUTANEOUS ONCE
Status: COMPLETED | OUTPATIENT
Start: 2022-01-01 | End: 2022-01-01

## 2022-01-01 RX ORDER — ALBUTEROL SULFATE 0.83 MG/ML
2.5 SOLUTION RESPIRATORY (INHALATION)
Status: DISCONTINUED | OUTPATIENT
Start: 2022-01-01 | End: 2022-01-01

## 2022-01-01 RX ORDER — PROPOFOL 10 MG/ML
INJECTION, EMULSION INTRAVENOUS
Status: COMPLETED
Start: 2022-01-01 | End: 2022-01-01

## 2022-01-01 RX ORDER — ETOMIDATE 2 MG/ML
INJECTION INTRAVENOUS
Status: COMPLETED
Start: 2022-01-01 | End: 2022-01-01

## 2022-01-01 RX ORDER — HYDRALAZINE HYDROCHLORIDE 25 MG/1
25 TABLET, FILM COATED ORAL
COMMUNITY

## 2022-01-01 RX ORDER — CHLORHEXIDINE GLUCONATE 0.12 MG/ML
15 RINSE ORAL EVERY 12 HOURS
Status: DISCONTINUED | OUTPATIENT
Start: 2022-01-01 | End: 2022-01-01

## 2022-01-01 RX ORDER — ACETAMINOPHEN 325 MG/1
650 TABLET ORAL
Status: DISCONTINUED | OUTPATIENT
Start: 2022-01-01 | End: 2022-01-01 | Stop reason: HOSPADM

## 2022-01-01 RX ORDER — SODIUM CHLORIDE 9 MG/ML
500 INJECTION, SOLUTION INTRAVENOUS ONCE
Status: COMPLETED | OUTPATIENT
Start: 2022-01-01 | End: 2022-01-01

## 2022-01-01 RX ORDER — INSULIN LISPRO 100 [IU]/ML
INJECTION, SOLUTION INTRAVENOUS; SUBCUTANEOUS EVERY 6 HOURS
Status: DISCONTINUED | OUTPATIENT
Start: 2022-01-01 | End: 2022-01-01 | Stop reason: HOSPADM

## 2022-01-01 RX ORDER — ALBUMIN HUMAN 50 G/1000ML
SOLUTION INTRAVENOUS
Status: DISCONTINUED
Start: 2022-01-01 | End: 2022-01-01 | Stop reason: WASHOUT

## 2022-01-01 RX ORDER — ONDANSETRON 2 MG/ML
4 INJECTION INTRAMUSCULAR; INTRAVENOUS
Status: DISCONTINUED | OUTPATIENT
Start: 2022-01-01 | End: 2022-01-01 | Stop reason: HOSPADM

## 2022-01-01 RX ORDER — LABETALOL HYDROCHLORIDE 5 MG/ML
20 INJECTION, SOLUTION INTRAVENOUS
Status: DISCONTINUED | OUTPATIENT
Start: 2022-01-01 | End: 2022-01-01 | Stop reason: HOSPADM

## 2022-01-01 RX ORDER — ALBUMIN HUMAN 50 G/1000ML
12.5 SOLUTION INTRAVENOUS ONCE
Status: COMPLETED | OUTPATIENT
Start: 2022-01-01 | End: 2022-01-01

## 2022-01-01 RX ORDER — LORAZEPAM 2 MG/ML
2 INJECTION INTRAMUSCULAR
Status: DISCONTINUED | OUTPATIENT
Start: 2022-01-01 | End: 2022-01-01 | Stop reason: HOSPADM

## 2022-01-01 RX ORDER — CLOPIDOGREL BISULFATE 75 MG/1
150 TABLET ORAL ONCE
Status: DISPENSED | OUTPATIENT
Start: 2022-01-01 | End: 2022-01-01

## 2022-01-01 RX ORDER — ATORVASTATIN CALCIUM 40 MG/1
40 TABLET, FILM COATED ORAL DAILY
Status: DISCONTINUED | OUTPATIENT
Start: 2022-01-01 | End: 2022-01-01 | Stop reason: HOSPADM

## 2022-01-01 RX ORDER — CHLORHEXIDINE GLUCONATE 1.2 MG/ML
15 RINSE ORAL EVERY 12 HOURS
Status: DISCONTINUED | OUTPATIENT
Start: 2022-01-01 | End: 2022-01-01 | Stop reason: HOSPADM

## 2022-01-01 RX ORDER — KETAMINE HYDROCHLORIDE 50 MG/ML
INJECTION, SOLUTION INTRAMUSCULAR; INTRAVENOUS
Status: DISCONTINUED
Start: 2022-01-01 | End: 2022-01-01 | Stop reason: WASHOUT

## 2022-01-01 RX ORDER — PROPOFOL 10 MG/ML
50 INJECTION, EMULSION INTRAVENOUS
Status: COMPLETED | OUTPATIENT
Start: 2022-01-01 | End: 2022-01-01

## 2022-01-01 RX ORDER — ACETAMINOPHEN 650 MG/1
650 SUPPOSITORY RECTAL
Status: DISCONTINUED | OUTPATIENT
Start: 2022-01-01 | End: 2022-01-01 | Stop reason: HOSPADM

## 2022-01-01 RX ORDER — MIDAZOLAM HYDROCHLORIDE 1 MG/ML
2 INJECTION, SOLUTION INTRAMUSCULAR; INTRAVENOUS
Status: COMPLETED | OUTPATIENT
Start: 2022-01-01 | End: 2022-01-01

## 2022-01-01 RX ORDER — MIDAZOLAM HYDROCHLORIDE 1 MG/ML
5 INJECTION, SOLUTION INTRAMUSCULAR; INTRAVENOUS ONCE
Status: COMPLETED | OUTPATIENT
Start: 2022-01-01 | End: 2022-01-01

## 2022-01-01 RX ORDER — POLYETHYLENE GLYCOL 3350 17 G/17G
17 POWDER, FOR SOLUTION ORAL DAILY PRN
Status: DISCONTINUED | OUTPATIENT
Start: 2022-01-01 | End: 2022-01-01

## 2022-01-01 RX ORDER — AMOXICILLIN 250 MG
1 CAPSULE ORAL DAILY
Status: DISCONTINUED | OUTPATIENT
Start: 2022-01-01 | End: 2022-01-01 | Stop reason: HOSPADM

## 2022-01-01 RX ORDER — ISOSORBIDE DINITRATE 10 MG/1
10 TABLET ORAL 3 TIMES DAILY
Status: DISCONTINUED | OUTPATIENT
Start: 2022-01-01 | End: 2022-01-01 | Stop reason: HOSPADM

## 2022-01-01 RX ORDER — CARVEDILOL 3.12 MG/1
3.12 TABLET ORAL 2 TIMES DAILY WITH MEALS
Status: DISCONTINUED | OUTPATIENT
Start: 2022-01-01 | End: 2022-01-01

## 2022-01-01 RX ORDER — POLYETHYLENE GLYCOL 3350 17 G/17G
17 POWDER, FOR SOLUTION ORAL DAILY
Status: DISCONTINUED | OUTPATIENT
Start: 2022-01-01 | End: 2022-01-01 | Stop reason: HOSPADM

## 2022-01-01 RX ORDER — MIDAZOLAM HYDROCHLORIDE 1 MG/ML
INJECTION, SOLUTION INTRAMUSCULAR; INTRAVENOUS
Status: COMPLETED
Start: 2022-01-01 | End: 2022-01-01

## 2022-01-01 RX ORDER — INSULIN GLARGINE 100 [IU]/ML
20 INJECTION, SOLUTION SUBCUTANEOUS DAILY
Status: DISCONTINUED | OUTPATIENT
Start: 2022-01-01 | End: 2022-01-01

## 2022-01-01 RX ORDER — INSULIN GLARGINE 100 [IU]/ML
20 INJECTION, SOLUTION SUBCUTANEOUS EVERY 12 HOURS
Status: DISCONTINUED | OUTPATIENT
Start: 2022-01-01 | End: 2022-01-01

## 2022-01-01 RX ORDER — HEPARIN SODIUM 5000 [USP'U]/ML
5000 INJECTION, SOLUTION INTRAVENOUS; SUBCUTANEOUS EVERY 8 HOURS
Status: DISCONTINUED | OUTPATIENT
Start: 2022-01-01 | End: 2022-01-01

## 2022-01-01 RX ORDER — LACOSAMIDE 50 MG/1
100 TABLET ORAL EVERY 12 HOURS
Status: DISCONTINUED | OUTPATIENT
Start: 2022-01-01 | End: 2022-01-01 | Stop reason: HOSPADM

## 2022-01-01 RX ORDER — GUAIFENESIN 100 MG/5ML
100 SOLUTION ORAL
Status: DISCONTINUED | OUTPATIENT
Start: 2022-01-01 | End: 2022-01-01 | Stop reason: HOSPADM

## 2022-01-01 RX ORDER — SODIUM CHLORIDE 0.9 % (FLUSH) 0.9 %
5-40 SYRINGE (ML) INJECTION EVERY 8 HOURS
Status: DISCONTINUED | OUTPATIENT
Start: 2022-01-01 | End: 2022-01-01 | Stop reason: HOSPADM

## 2022-01-01 RX ORDER — ALBUTEROL SULFATE 0.83 MG/ML
2.5 SOLUTION RESPIRATORY (INHALATION) 3 TIMES DAILY
Status: DISCONTINUED | OUTPATIENT
Start: 2022-01-01 | End: 2022-01-01

## 2022-01-01 RX ORDER — DEXMEDETOMIDINE HYDROCHLORIDE 4 UG/ML
.1-1.5 INJECTION, SOLUTION INTRAVENOUS
Status: DISCONTINUED | OUTPATIENT
Start: 2022-01-01 | End: 2022-01-01 | Stop reason: HOSPADM

## 2022-01-01 RX ORDER — ASPIRIN 325 MG
325 TABLET ORAL DAILY
Status: DISCONTINUED | OUTPATIENT
Start: 2022-01-01 | End: 2022-01-01 | Stop reason: HOSPADM

## 2022-01-01 RX ADMIN — DIAPER RASH SKIN PROTECTENT: at 06:03

## 2022-01-01 RX ADMIN — Medication 30 MG: at 08:51

## 2022-01-01 RX ADMIN — Medication 10 UNITS: at 09:00

## 2022-01-01 RX ADMIN — Medication 30 MG: at 11:17

## 2022-01-01 RX ADMIN — Medication 10 ML: at 14:00

## 2022-01-01 RX ADMIN — PROPOFOL 20 MCG/KG/MIN: 10 INJECTION, EMULSION INTRAVENOUS at 06:45

## 2022-01-01 RX ADMIN — Medication 2 UNITS: at 12:00

## 2022-01-01 RX ADMIN — HEPARIN SODIUM 5000 UNITS: 5000 INJECTION INTRAVENOUS; SUBCUTANEOUS at 23:41

## 2022-01-01 RX ADMIN — CHLORHEXIDINE GLUCONATE 15 ML: 1.2 RINSE ORAL at 09:00

## 2022-01-01 RX ADMIN — DEXMEDETOMIDINE HYDROCHLORIDE 0.4 MCG/KG/HR: 400 INJECTION INTRAVENOUS at 08:51

## 2022-01-01 RX ADMIN — Medication 2 UNITS: at 11:34

## 2022-01-01 RX ADMIN — DEXMEDETOMIDINE HYDROCHLORIDE 0.8 MCG/KG/HR: 400 INJECTION INTRAVENOUS at 16:15

## 2022-01-01 RX ADMIN — DEXMEDETOMIDINE HYDROCHLORIDE 0.8 MCG/KG/HR: 400 INJECTION INTRAVENOUS at 02:01

## 2022-01-01 RX ADMIN — HEPARIN SODIUM 5000 UNITS: 5000 INJECTION INTRAVENOUS; SUBCUTANEOUS at 16:36

## 2022-01-01 RX ADMIN — LISINOPRIL 5 MG: 5 TABLET ORAL at 09:52

## 2022-01-01 RX ADMIN — Medication 10 ML: at 14:21

## 2022-01-01 RX ADMIN — HEPARIN SODIUM 5000 UNITS: 5000 INJECTION INTRAVENOUS; SUBCUTANEOUS at 06:08

## 2022-01-01 RX ADMIN — Medication 20 UNITS: at 21:32

## 2022-01-01 RX ADMIN — ATORVASTATIN CALCIUM 40 MG: 40 TABLET, FILM COATED ORAL at 08:51

## 2022-01-01 RX ADMIN — INSULIN GLARGINE 10 UNITS: 100 INJECTION, SOLUTION SUBCUTANEOUS at 21:24

## 2022-01-01 RX ADMIN — DIAPER RASH SKIN PROTECTENT: at 21:09

## 2022-01-01 RX ADMIN — PROPOFOL 15 MCG/KG/MIN: 10 INJECTION, EMULSION INTRAVENOUS at 06:25

## 2022-01-01 RX ADMIN — Medication 3 UNITS: at 06:46

## 2022-01-01 RX ADMIN — Medication 20 UNITS: at 10:20

## 2022-01-01 RX ADMIN — CHLORHEXIDINE GLUCONATE 15 ML: 1.2 RINSE ORAL at 21:09

## 2022-01-01 RX ADMIN — HEPARIN SODIUM 5000 UNITS: 5000 INJECTION INTRAVENOUS; SUBCUTANEOUS at 23:53

## 2022-01-01 RX ADMIN — HEPARIN SODIUM 5000 UNITS: 5000 INJECTION INTRAVENOUS; SUBCUTANEOUS at 14:21

## 2022-01-01 RX ADMIN — CLOPIDOGREL 75 MG: 75 TABLET, FILM COATED ORAL at 08:08

## 2022-01-01 RX ADMIN — CHLORHEXIDINE GLUCONATE 15 ML: 1.2 RINSE ORAL at 09:53

## 2022-01-01 RX ADMIN — Medication 10 ML: at 06:47

## 2022-01-01 RX ADMIN — FENTANYL CITRATE 100 MCG: 50 INJECTION INTRAMUSCULAR; INTRAVENOUS at 07:38

## 2022-01-01 RX ADMIN — ASPIRIN 325 MG ORAL TABLET 325 MG: 325 PILL ORAL at 09:12

## 2022-01-01 RX ADMIN — ROCURONIUM BROMIDE 100 MG: 10 INJECTION INTRAVENOUS at 06:45

## 2022-01-01 RX ADMIN — Medication 10 ML: at 21:19

## 2022-01-01 RX ADMIN — CLOPIDOGREL 75 MG: 75 TABLET, FILM COATED ORAL at 09:12

## 2022-01-01 RX ADMIN — HEPARIN SODIUM 5000 UNITS: 5000 INJECTION INTRAVENOUS; SUBCUTANEOUS at 05:36

## 2022-01-01 RX ADMIN — CHLORHEXIDINE GLUCONATE 15 ML: 1.2 RINSE ORAL at 21:00

## 2022-01-01 RX ADMIN — IOPAMIDOL 40 ML: 755 INJECTION, SOLUTION INTRAVENOUS at 03:31

## 2022-01-01 RX ADMIN — Medication 7 UNITS: at 11:32

## 2022-01-01 RX ADMIN — Medication 3 UNITS: at 17:58

## 2022-01-01 RX ADMIN — HEPARIN SODIUM 5000 UNITS: 5000 INJECTION INTRAVENOUS; SUBCUTANEOUS at 06:32

## 2022-01-01 RX ADMIN — ATORVASTATIN CALCIUM 40 MG: 40 TABLET, FILM COATED ORAL at 11:17

## 2022-01-01 RX ADMIN — Medication 3 UNITS: at 06:32

## 2022-01-01 RX ADMIN — Medication 4 UNITS: at 06:40

## 2022-01-01 RX ADMIN — ETOMIDATE 30 MG: 2 INJECTION, SOLUTION INTRAVENOUS at 06:44

## 2022-01-01 RX ADMIN — HEPARIN SODIUM 5000 UNITS: 5000 INJECTION INTRAVENOUS; SUBCUTANEOUS at 23:48

## 2022-01-01 RX ADMIN — ATORVASTATIN CALCIUM 40 MG: 40 TABLET, FILM COATED ORAL at 08:08

## 2022-01-01 RX ADMIN — SODIUM CHLORIDE 100 MG: 9 INJECTION, SOLUTION INTRAVENOUS at 21:04

## 2022-01-01 RX ADMIN — PROPOFOL 50 MG: 10 INJECTION, EMULSION INTRAVENOUS at 00:35

## 2022-01-01 RX ADMIN — Medication 30 MG: at 08:14

## 2022-01-01 RX ADMIN — SODIUM PHOSPHATE, MONOBASIC, MONOHYDRATE: 276; 142 INJECTION, SOLUTION INTRAVENOUS at 14:21

## 2022-01-01 RX ADMIN — IOPAMIDOL 80 ML: 755 INJECTION, SOLUTION INTRAVENOUS at 03:30

## 2022-01-01 RX ADMIN — Medication 10 ML: at 22:12

## 2022-01-01 RX ADMIN — PROPOFOL 15 MCG/KG/MIN: 10 INJECTION, EMULSION INTRAVENOUS at 12:49

## 2022-01-01 RX ADMIN — CARVEDILOL 6.25 MG: 6.25 TABLET, FILM COATED ORAL at 09:51

## 2022-01-01 RX ADMIN — PROPOFOL 20 MCG/KG/MIN: 10 INJECTION, EMULSION INTRAVENOUS at 22:29

## 2022-01-01 RX ADMIN — MIDAZOLAM HYDROCHLORIDE 5 MG: 1 INJECTION, SOLUTION INTRAMUSCULAR; INTRAVENOUS at 05:09

## 2022-01-01 RX ADMIN — Medication 10 ML: at 14:34

## 2022-01-01 RX ADMIN — ISOSORBIDE DINITRATE 10 MG: 10 TABLET ORAL at 08:39

## 2022-01-01 RX ADMIN — SODIUM CHLORIDE, POTASSIUM CHLORIDE, SODIUM LACTATE AND CALCIUM CHLORIDE 500 ML: 600; 310; 30; 20 INJECTION, SOLUTION INTRAVENOUS at 22:08

## 2022-01-01 RX ADMIN — ASPIRIN 325 MG ORAL TABLET 325 MG: 325 PILL ORAL at 08:46

## 2022-01-01 RX ADMIN — Medication 2 UNITS: at 23:48

## 2022-01-01 RX ADMIN — HEPARIN SODIUM 5000 UNITS: 5000 INJECTION INTRAVENOUS; SUBCUTANEOUS at 15:01

## 2022-01-01 RX ADMIN — HEPARIN SODIUM 5000 UNITS: 5000 INJECTION INTRAVENOUS; SUBCUTANEOUS at 06:59

## 2022-01-01 RX ADMIN — SODIUM CHLORIDE 100 MG: 9 INJECTION, SOLUTION INTRAVENOUS at 09:36

## 2022-01-01 RX ADMIN — Medication 10 ML: at 21:01

## 2022-01-01 RX ADMIN — SODIUM CHLORIDE 150 MG: 9 INJECTION, SOLUTION INTRAVENOUS at 10:42

## 2022-01-01 RX ADMIN — Medication 30 MG: at 09:51

## 2022-01-01 RX ADMIN — Medication 3 UNITS: at 23:59

## 2022-01-01 RX ADMIN — CHLORHEXIDINE GLUCONATE 15 ML: 1.2 RINSE ORAL at 09:03

## 2022-01-01 RX ADMIN — HEPARIN SODIUM 5000 UNITS: 5000 INJECTION INTRAVENOUS; SUBCUTANEOUS at 14:47

## 2022-01-01 RX ADMIN — Medication 10 UNITS: at 08:08

## 2022-01-01 RX ADMIN — ISOSORBIDE DINITRATE 10 MG: 10 TABLET ORAL at 16:39

## 2022-01-01 RX ADMIN — DEXMEDETOMIDINE HYDROCHLORIDE 0.5 MCG/KG/HR: 400 INJECTION INTRAVENOUS at 12:55

## 2022-01-01 RX ADMIN — Medication 10 UNITS: at 09:28

## 2022-01-01 RX ADMIN — Medication 10 UNITS: at 09:01

## 2022-01-01 RX ADMIN — DIAPER RASH SKIN PROTECTENT: at 13:40

## 2022-01-01 RX ADMIN — DEXMEDETOMIDINE HYDROCHLORIDE 0.4 MCG/KG/HR: 400 INJECTION INTRAVENOUS at 00:20

## 2022-01-01 RX ADMIN — PROPOFOL 20 MCG/KG/MIN: 10 INJECTION, EMULSION INTRAVENOUS at 20:45

## 2022-01-01 RX ADMIN — Medication 2 UNITS: at 18:13

## 2022-01-01 RX ADMIN — HEPARIN SODIUM 5000 UNITS: 5000 INJECTION INTRAVENOUS; SUBCUTANEOUS at 22:41

## 2022-01-01 RX ADMIN — PROPOFOL 15 MCG/KG/MIN: 10 INJECTION, EMULSION INTRAVENOUS at 08:49

## 2022-01-01 RX ADMIN — Medication 3 UNITS: at 17:18

## 2022-01-01 RX ADMIN — ISOSORBIDE DINITRATE 10 MG: 10 TABLET ORAL at 21:21

## 2022-01-01 RX ADMIN — Medication 10 ML: at 21:21

## 2022-01-01 RX ADMIN — POTASSIUM BICARBONATE 40 MEQ: 782 TABLET, EFFERVESCENT ORAL at 06:29

## 2022-01-01 RX ADMIN — LACOSAMIDE 100 MG: 50 TABLET, FILM COATED ORAL at 22:29

## 2022-01-01 RX ADMIN — Medication 10 ML: at 06:49

## 2022-01-01 RX ADMIN — PROPOFOL 15 MCG/KG/MIN: 10 INJECTION, EMULSION INTRAVENOUS at 02:21

## 2022-01-01 RX ADMIN — HEPARIN SODIUM 5000 UNITS: 5000 INJECTION INTRAVENOUS; SUBCUTANEOUS at 06:29

## 2022-01-01 RX ADMIN — INSULIN GLARGINE 25 UNITS: 100 INJECTION, SOLUTION SUBCUTANEOUS at 09:52

## 2022-01-01 RX ADMIN — Medication 3 UNITS: at 12:36

## 2022-01-01 RX ADMIN — ASPIRIN 325 MG ORAL TABLET 325 MG: 325 PILL ORAL at 08:14

## 2022-01-01 RX ADMIN — INSULIN GLARGINE 5 UNITS: 100 INJECTION, SOLUTION SUBCUTANEOUS at 08:49

## 2022-01-01 RX ADMIN — Medication 30 MG: at 08:46

## 2022-01-01 RX ADMIN — ASPIRIN 325 MG ORAL TABLET 325 MG: 325 PILL ORAL at 09:02

## 2022-01-01 RX ADMIN — Medication 10 ML: at 21:09

## 2022-01-01 RX ADMIN — DIAPER RASH SKIN PROTECTENT: at 06:49

## 2022-01-01 RX ADMIN — FENTANYL CITRATE 100 MCG: 50 INJECTION, SOLUTION INTRAMUSCULAR; INTRAVENOUS at 05:36

## 2022-01-01 RX ADMIN — GUAIFENESIN 100 MG: 200 SOLUTION ORAL at 02:01

## 2022-01-01 RX ADMIN — ATORVASTATIN CALCIUM 40 MG: 40 TABLET, FILM COATED ORAL at 08:46

## 2022-01-01 RX ADMIN — MIDAZOLAM 2 MG: 1 INJECTION INTRAMUSCULAR; INTRAVENOUS at 20:46

## 2022-01-01 RX ADMIN — CHLORHEXIDINE GLUCONATE 15 ML: 1.2 RINSE ORAL at 08:50

## 2022-01-01 RX ADMIN — HEPARIN SODIUM 5000 UNITS: 5000 INJECTION INTRAVENOUS; SUBCUTANEOUS at 06:31

## 2022-01-01 RX ADMIN — Medication 30 MG: at 08:42

## 2022-01-01 RX ADMIN — MIDAZOLAM 2 MG: 1 INJECTION INTRAMUSCULAR; INTRAVENOUS at 18:35

## 2022-01-01 RX ADMIN — Medication 10 ML: at 14:08

## 2022-01-01 RX ADMIN — HEPARIN SODIUM 5000 UNITS: 5000 INJECTION INTRAVENOUS; SUBCUTANEOUS at 23:00

## 2022-01-01 RX ADMIN — DIAPER RASH SKIN PROTECTENT: at 21:01

## 2022-01-01 RX ADMIN — Medication 2 UNITS: at 00:31

## 2022-01-01 RX ADMIN — Medication 10 ML: at 06:03

## 2022-01-01 RX ADMIN — Medication 10 ML: at 13:51

## 2022-01-01 RX ADMIN — Medication 2 UNITS: at 00:37

## 2022-01-01 RX ADMIN — HEPARIN SODIUM 5000 UNITS: 5000 INJECTION INTRAVENOUS; SUBCUTANEOUS at 14:07

## 2022-01-01 RX ADMIN — LISINOPRIL 5 MG: 5 TABLET ORAL at 08:39

## 2022-01-01 RX ADMIN — ATORVASTATIN CALCIUM 40 MG: 40 TABLET, FILM COATED ORAL at 09:02

## 2022-01-01 RX ADMIN — ATORVASTATIN CALCIUM 40 MG: 40 TABLET, FILM COATED ORAL at 09:36

## 2022-01-01 RX ADMIN — HEPARIN SODIUM 5000 UNITS: 5000 INJECTION INTRAVENOUS; SUBCUTANEOUS at 04:16

## 2022-01-01 RX ADMIN — INSULIN GLARGINE 25 UNITS: 100 INJECTION, SOLUTION SUBCUTANEOUS at 21:21

## 2022-01-01 RX ADMIN — DEXMEDETOMIDINE HYDROCHLORIDE 0.5 MCG/KG/HR: 400 INJECTION INTRAVENOUS at 06:40

## 2022-01-01 RX ADMIN — ACETAMINOPHEN 650 MG: 325 TABLET ORAL at 04:25

## 2022-01-01 RX ADMIN — CLOPIDOGREL 75 MG: 75 TABLET, FILM COATED ORAL at 09:02

## 2022-01-01 RX ADMIN — Medication 30 MG: at 09:12

## 2022-01-01 RX ADMIN — Medication 3 UNITS: at 06:40

## 2022-01-01 RX ADMIN — Medication 10 ML: at 06:00

## 2022-01-01 RX ADMIN — Medication 3 UNITS: at 00:39

## 2022-01-01 RX ADMIN — DIAPER RASH SKIN PROTECTENT: at 22:30

## 2022-01-01 RX ADMIN — Medication 20 UNITS: at 08:38

## 2022-01-01 RX ADMIN — Medication 3 UNITS: at 19:13

## 2022-01-01 RX ADMIN — DIAPER RASH SKIN PROTECTENT: at 14:34

## 2022-01-01 RX ADMIN — Medication 5 UNITS: at 12:00

## 2022-01-01 RX ADMIN — DIAPER RASH SKIN PROTECTENT: at 13:51

## 2022-01-01 RX ADMIN — CARVEDILOL 6.25 MG: 6.25 TABLET, FILM COATED ORAL at 08:39

## 2022-01-01 RX ADMIN — CLOPIDOGREL 75 MG: 75 TABLET, FILM COATED ORAL at 08:49

## 2022-01-01 RX ADMIN — Medication 7 UNITS: at 06:08

## 2022-01-01 RX ADMIN — HEPARIN SODIUM 5000 UNITS: 5000 INJECTION INTRAVENOUS; SUBCUTANEOUS at 15:12

## 2022-01-01 RX ADMIN — ACETYLCYSTEINE 400 MG: 200 INHALANT RESPIRATORY (INHALATION) at 22:14

## 2022-01-01 RX ADMIN — DIAPER RASH SKIN PROTECTENT: at 21:21

## 2022-01-01 RX ADMIN — LORAZEPAM 2 MG: 2 INJECTION INTRAMUSCULAR; INTRAVENOUS at 02:23

## 2022-01-01 RX ADMIN — ASPIRIN 325 MG ORAL TABLET 325 MG: 325 PILL ORAL at 10:19

## 2022-01-01 RX ADMIN — PROPOFOL 10 MCG/KG/MIN: 10 INJECTION, EMULSION INTRAVENOUS at 13:37

## 2022-01-01 RX ADMIN — CLOPIDOGREL 75 MG: 75 TABLET, FILM COATED ORAL at 08:14

## 2022-01-01 RX ADMIN — Medication 2 UNITS: at 17:14

## 2022-01-01 RX ADMIN — ASPIRIN 325 MG ORAL TABLET 325 MG: 325 PILL ORAL at 08:39

## 2022-01-01 RX ADMIN — ATORVASTATIN CALCIUM 40 MG: 40 TABLET, FILM COATED ORAL at 09:52

## 2022-01-01 RX ADMIN — POLYETHYLENE GLYCOL 3350 17 G: 17 POWDER, FOR SOLUTION ORAL at 14:07

## 2022-01-01 RX ADMIN — Medication 30 MG: at 10:20

## 2022-01-01 RX ADMIN — CLOPIDOGREL 75 MG: 75 TABLET, FILM COATED ORAL at 08:39

## 2022-01-01 RX ADMIN — Medication 3 UNITS: at 17:13

## 2022-01-01 RX ADMIN — SODIUM CHLORIDE 100 MG: 9 INJECTION, SOLUTION INTRAVENOUS at 21:47

## 2022-01-01 RX ADMIN — DIAPER RASH SKIN PROTECTENT: at 05:12

## 2022-01-01 RX ADMIN — SENNOSIDES AND DOCUSATE SODIUM 1 TABLET: 50; 8.6 TABLET ORAL at 14:07

## 2022-01-01 RX ADMIN — DIAPER RASH SKIN PROTECTENT: at 14:00

## 2022-01-01 RX ADMIN — Medication 10 ML: at 06:01

## 2022-01-01 RX ADMIN — LACOSAMIDE 100 MG: 50 TABLET, FILM COATED ORAL at 21:21

## 2022-01-01 RX ADMIN — CLOPIDOGREL 75 MG: 75 TABLET, FILM COATED ORAL at 10:19

## 2022-01-01 RX ADMIN — CHLORHEXIDINE GLUCONATE 15 ML: 1.2 RINSE ORAL at 21:32

## 2022-01-01 RX ADMIN — GUAIFENESIN 100 MG: 200 SOLUTION ORAL at 18:35

## 2022-01-01 RX ADMIN — HEPARIN SODIUM 5000 UNITS: 5000 INJECTION INTRAVENOUS; SUBCUTANEOUS at 09:30

## 2022-01-01 RX ADMIN — DIAPER RASH SKIN PROTECTENT: at 21:32

## 2022-01-01 RX ADMIN — MIDAZOLAM 5 MG: 1 INJECTION INTRAMUSCULAR; INTRAVENOUS at 05:09

## 2022-01-01 RX ADMIN — Medication 10 ML: at 06:31

## 2022-01-01 RX ADMIN — SODIUM CHLORIDE 500 ML/HR: 9 INJECTION, SOLUTION INTRAVENOUS at 06:08

## 2022-01-01 RX ADMIN — SODIUM CHLORIDE 100 MG: 9 INJECTION, SOLUTION INTRAVENOUS at 09:14

## 2022-01-01 RX ADMIN — SENNOSIDES AND DOCUSATE SODIUM 1 TABLET: 50; 8.6 TABLET ORAL at 10:19

## 2022-01-01 RX ADMIN — CHLORHEXIDINE GLUCONATE 15 ML: 1.2 RINSE ORAL at 21:21

## 2022-01-01 RX ADMIN — Medication 10 ML: at 21:32

## 2022-01-01 RX ADMIN — Medication 4 UNITS: at 23:09

## 2022-01-01 RX ADMIN — HEPARIN SODIUM 5000 UNITS: 5000 INJECTION INTRAVENOUS; SUBCUTANEOUS at 22:39

## 2022-01-01 RX ADMIN — CLOPIDOGREL BISULFATE 150 MG: 75 TABLET ORAL at 18:07

## 2022-01-01 RX ADMIN — CLOPIDOGREL 75 MG: 75 TABLET, FILM COATED ORAL at 11:16

## 2022-01-01 RX ADMIN — DIAPER RASH SKIN PROTECTENT: at 13:12

## 2022-01-01 RX ADMIN — ASPIRIN 325 MG ORAL TABLET 325 MG: 325 PILL ORAL at 08:49

## 2022-01-01 RX ADMIN — INSULIN GLARGINE 5 UNITS: 100 INJECTION, SOLUTION SUBCUTANEOUS at 13:40

## 2022-01-01 RX ADMIN — Medication 10 ML: at 05:10

## 2022-01-01 RX ADMIN — CLOPIDOGREL 75 MG: 75 TABLET, FILM COATED ORAL at 09:55

## 2022-01-01 RX ADMIN — CHLORHEXIDINE GLUCONATE 15 ML: 1.2 RINSE ORAL at 20:14

## 2022-01-01 RX ADMIN — Medication 30 MG: at 09:36

## 2022-01-01 RX ADMIN — DEXMEDETOMIDINE HYDROCHLORIDE 1.4 MCG/KG/HR: 400 INJECTION INTRAVENOUS at 20:59

## 2022-01-01 RX ADMIN — CHLORHEXIDINE GLUCONATE 15 ML: 1.2 RINSE ORAL at 20:52

## 2022-01-01 RX ADMIN — ISOSORBIDE DINITRATE 10 MG: 10 TABLET ORAL at 09:52

## 2022-01-01 RX ADMIN — ISOSORBIDE DINITRATE 10 MG: 10 TABLET ORAL at 23:53

## 2022-01-01 RX ADMIN — LABETALOL HYDROCHLORIDE 20 MG: 5 INJECTION INTRAVENOUS at 11:10

## 2022-01-01 RX ADMIN — HEPARIN SODIUM 5000 UNITS: 5000 INJECTION INTRAVENOUS; SUBCUTANEOUS at 22:29

## 2022-01-01 RX ADMIN — EPOETIN ALFA-EPBX 4000 UNITS: 4000 INJECTION, SOLUTION INTRAVENOUS; SUBCUTANEOUS at 21:21

## 2022-01-01 RX ADMIN — ATORVASTATIN CALCIUM 40 MG: 40 TABLET, FILM COATED ORAL at 09:12

## 2022-01-01 RX ADMIN — DEXMEDETOMIDINE HYDROCHLORIDE 0.5 MCG/KG/HR: 400 INJECTION INTRAVENOUS at 20:55

## 2022-01-01 RX ADMIN — INSULIN GLARGINE 5 UNITS: 100 INJECTION, SOLUTION SUBCUTANEOUS at 11:55

## 2022-01-01 RX ADMIN — Medication 10 ML: at 13:12

## 2022-01-01 RX ADMIN — DEXTROSE MONOHYDRATE 25 G: 25 INJECTION, SOLUTION INTRAVENOUS at 09:11

## 2022-01-01 RX ADMIN — SENNOSIDES AND DOCUSATE SODIUM 1 TABLET: 50; 8.6 TABLET ORAL at 09:52

## 2022-01-01 RX ADMIN — DIAPER RASH SKIN PROTECTENT: at 23:56

## 2022-01-01 RX ADMIN — ASPIRIN 325 MG ORAL TABLET 325 MG: 325 PILL ORAL at 08:08

## 2022-01-01 RX ADMIN — Medication 4 UNITS: at 23:58

## 2022-01-01 RX ADMIN — ATORVASTATIN CALCIUM 40 MG: 40 TABLET, FILM COATED ORAL at 08:14

## 2022-01-01 RX ADMIN — Medication 10 ML: at 06:29

## 2022-01-01 RX ADMIN — Medication 10 UNITS: at 09:36

## 2022-01-01 RX ADMIN — ISOSORBIDE DINITRATE 10 MG: 10 TABLET ORAL at 22:29

## 2022-01-01 RX ADMIN — Medication 10 ML: at 13:40

## 2022-01-01 RX ADMIN — HEPARIN SODIUM 5000 UNITS: 5000 INJECTION INTRAVENOUS; SUBCUTANEOUS at 16:20

## 2022-01-01 RX ADMIN — ASPIRIN 325 MG ORAL TABLET 325 MG: 325 PILL ORAL at 09:36

## 2022-01-01 RX ADMIN — ATORVASTATIN CALCIUM 40 MG: 40 TABLET, FILM COATED ORAL at 08:49

## 2022-01-01 RX ADMIN — ASPIRIN 325 MG ORAL TABLET 325 MG: 325 PILL ORAL at 09:52

## 2022-01-01 RX ADMIN — HEPARIN SODIUM 5000 UNITS: 5000 INJECTION INTRAVENOUS; SUBCUTANEOUS at 14:34

## 2022-01-01 RX ADMIN — CARVEDILOL 6.25 MG: 6.25 TABLET, FILM COATED ORAL at 23:53

## 2022-01-01 RX ADMIN — Medication 30 MG: at 09:02

## 2022-01-01 RX ADMIN — ATORVASTATIN CALCIUM 40 MG: 40 TABLET, FILM COATED ORAL at 10:19

## 2022-01-01 RX ADMIN — PROPOFOL 4 MCG/KG/MIN: 10 INJECTION, EMULSION INTRAVENOUS at 11:46

## 2022-01-01 RX ADMIN — CLOPIDOGREL 75 MG: 75 TABLET, FILM COATED ORAL at 08:51

## 2022-01-01 RX ADMIN — HEPARIN SODIUM 5000 UNITS: 5000 INJECTION INTRAVENOUS; SUBCUTANEOUS at 14:05

## 2022-01-01 RX ADMIN — LACOSAMIDE 100 MG: 50 TABLET, FILM COATED ORAL at 10:19

## 2022-01-01 RX ADMIN — HEPARIN SODIUM 5000 UNITS: 5000 INJECTION INTRAVENOUS; SUBCUTANEOUS at 17:57

## 2022-01-01 RX ADMIN — DEXMEDETOMIDINE HYDROCHLORIDE 0.5 MCG/KG/HR: 400 INJECTION INTRAVENOUS at 11:15

## 2022-01-01 RX ADMIN — Medication 10 ML: at 13:47

## 2022-01-01 RX ADMIN — ALBUMIN (HUMAN) 25 G: 12.5 INJECTION, SOLUTION INTRAVENOUS at 16:17

## 2022-01-01 RX ADMIN — CLOPIDOGREL 75 MG: 75 TABLET, FILM COATED ORAL at 08:46

## 2022-01-01 RX ADMIN — HEPARIN SODIUM 5000 UNITS: 5000 INJECTION INTRAVENOUS; SUBCUTANEOUS at 22:35

## 2022-01-01 RX ADMIN — DEXMEDETOMIDINE HYDROCHLORIDE 0.3 MCG/KG/HR: 400 INJECTION INTRAVENOUS at 00:48

## 2022-01-01 RX ADMIN — Medication 30 MG: at 08:08

## 2022-01-01 RX ADMIN — GUAIFENESIN 100 MG: 200 SOLUTION ORAL at 22:35

## 2022-01-01 RX ADMIN — CLOPIDOGREL 75 MG: 75 TABLET, FILM COATED ORAL at 09:36

## 2022-01-01 RX ADMIN — DIAPER RASH SKIN PROTECTENT: at 15:12

## 2022-01-01 RX ADMIN — Medication 10 ML: at 06:42

## 2022-01-01 RX ADMIN — HEPARIN SODIUM 5000 UNITS: 5000 INJECTION INTRAVENOUS; SUBCUTANEOUS at 06:07

## 2022-01-01 RX ADMIN — ALBUMIN (HUMAN) 12.5 G: 12.5 INJECTION, SOLUTION INTRAVENOUS at 15:38

## 2022-01-01 RX ADMIN — ALBUMIN (HUMAN) 25 G: 0.25 INJECTION, SOLUTION INTRAVENOUS at 11:26

## 2022-01-01 RX ADMIN — POTASSIUM PHOSPHATE, MONOBASIC POTASSIUM PHOSPHATE, DIBASIC: 224; 236 INJECTION, SOLUTION, CONCENTRATE INTRAVENOUS at 13:45

## 2022-01-01 RX ADMIN — Medication 3 UNITS: at 06:11

## 2022-01-01 RX ADMIN — LACOSAMIDE 100 MG: 50 TABLET, FILM COATED ORAL at 09:52

## 2022-01-01 RX ADMIN — CHLORHEXIDINE GLUCONATE 15 ML: 1.2 RINSE ORAL at 08:09

## 2022-01-01 RX ADMIN — Medication 2 UNITS: at 18:00

## 2022-01-01 RX ADMIN — HEPARIN SODIUM 5000 UNITS: 5000 INJECTION INTRAVENOUS; SUBCUTANEOUS at 04:55

## 2022-01-01 RX ADMIN — ALBUMIN (HUMAN) 25 G: 0.25 INJECTION, SOLUTION INTRAVENOUS at 06:00

## 2022-01-01 RX ADMIN — HEPARIN SODIUM 5000 UNITS: 5000 INJECTION INTRAVENOUS; SUBCUTANEOUS at 03:30

## 2022-01-01 RX ADMIN — ATORVASTATIN CALCIUM 40 MG: 40 TABLET, FILM COATED ORAL at 08:39

## 2022-01-01 RX ADMIN — PERFLUTREN 1.5 ML: 6.52 INJECTION, SUSPENSION INTRAVENOUS at 10:00

## 2022-01-01 RX ADMIN — DIAPER RASH SKIN PROTECTENT: at 06:02

## 2022-01-01 RX ADMIN — INSULIN GLARGINE 10 UNITS: 100 INJECTION, SOLUTION SUBCUTANEOUS at 13:50

## 2022-01-01 RX ADMIN — Medication 10 ML: at 22:29

## 2022-01-01 RX ADMIN — CARVEDILOL 6.25 MG: 6.25 TABLET, FILM COATED ORAL at 16:39

## 2022-01-01 RX ADMIN — Medication 3 UNITS: at 23:24

## 2022-01-01 RX ADMIN — CHLORHEXIDINE GLUCONATE 15 ML: 1.2 RINSE ORAL at 09:36

## 2022-01-01 RX ADMIN — ASPIRIN 600 MG: 300 SUPPOSITORY RECTAL at 05:46

## 2022-01-01 RX ADMIN — POLYETHYLENE GLYCOL 3350 17 G: 17 POWDER, FOR SOLUTION ORAL at 10:19

## 2022-01-01 RX ADMIN — Medication 3 UNITS: at 17:48

## 2022-01-01 RX ADMIN — DIAPER RASH SKIN PROTECTENT: at 14:21

## 2022-01-01 RX ADMIN — ALBUTEROL SULFATE 2.5 MG: 2.5 SOLUTION RESPIRATORY (INHALATION) at 22:14

## 2022-01-01 RX ADMIN — DIAPER RASH SKIN PROTECTENT: at 06:30

## 2022-01-01 RX ADMIN — HEPARIN SODIUM 5000 UNITS: 5000 INJECTION INTRAVENOUS; SUBCUTANEOUS at 23:04

## 2022-01-01 RX ADMIN — CHLORHEXIDINE GLUCONATE 15 ML: 1.2 RINSE ORAL at 09:30

## 2022-01-01 RX ADMIN — INSULIN GLARGINE 10 UNITS: 100 INJECTION, SOLUTION SUBCUTANEOUS at 21:12

## 2022-01-01 RX ADMIN — ALBUMIN (HUMAN) 25 G: 12.5 INJECTION, SOLUTION INTRAVENOUS at 08:57

## 2022-01-01 RX ADMIN — Medication 5 UNITS: at 12:05

## 2022-01-01 RX ADMIN — CHLORHEXIDINE GLUCONATE 15 ML: 1.2 RINSE ORAL at 14:21

## 2022-01-01 RX ADMIN — HEPARIN SODIUM 5000 UNITS: 5000 INJECTION INTRAVENOUS; SUBCUTANEOUS at 15:37

## 2022-01-01 RX ADMIN — CHLORHEXIDINE GLUCONATE 15 ML: 1.2 RINSE ORAL at 20:55

## 2022-01-01 RX ADMIN — ASPIRIN 325 MG ORAL TABLET 325 MG: 325 PILL ORAL at 11:16

## 2022-01-01 RX ADMIN — Medication 2 UNITS: at 23:51

## 2022-01-01 RX ADMIN — DIAPER RASH SKIN PROTECTENT: at 06:31

## 2022-01-01 RX ADMIN — HEPARIN SODIUM 5000 UNITS: 5000 INJECTION INTRAVENOUS; SUBCUTANEOUS at 14:29

## 2022-01-01 RX ADMIN — ASPIRIN 325 MG ORAL TABLET 325 MG: 325 PILL ORAL at 08:51

## 2022-01-08 NOTE — ED TRIAGE NOTES
Patient was found by [facility] staff after falling out of his bed. LKW 0100. Patient was then put back into his bed and was acting normally per staff. The NP at the facility wanted him evaluated. When staff reentered the room patient was not responding at 0200.    He is covid positive  /82 HR 82   Hx cerebral infarct with dysphagia, DM, bilateral BKA

## 2022-01-08 NOTE — ED NOTES
Called CT and asked why pt has not had CT and they were waiting to hear that he had respiratory available. [id number]; is extension for CT; call when respiratory is available.

## 2022-01-08 NOTE — PROGRESS NOTES
SPEECH THERAPY SCREENING:  SERVICES ARE NOT INDICATED AT THIS TIME    [facility] therapy based on results obtained during the nursing admission assessment. The patients chart was reviewed and the patient is not appropriate for a skilled therapy evaluation at this time. Please consult speech therapy if any therapy needs arise. Thank you. Patient currently on the vent.     Emmanuelle Grajeda, SLP

## 2022-01-08 NOTE — H&P
[facility]    ICU TEAM H&P    Name: Jovani Ramirez   : 1955   MRN: [id number]   Date: 2022      Subjective:   Progress Note: 2022      Reason for ICU Admission: Acute hypoxic respiratory failure in the setting of acute encephalopathy    Overnight Events: Presented to the emergency room for evaluation having fallen out of bed at the nursing home overnight. HPI: The patient is a 21-year-old male with a past medical history of diabetes, depression, stroke with left-sided deficit and dysarthria, hypertension, hyperlipidemia, CAD, end-stage renal disease, and BKA who presented to the emergency department having fallen out of bed. He continued to have altered mental status and was stroke alerted upon arrival to the emergency department. He was seen and evaluated by neurology and deemed not a candidate for tPA. CTA showed occluded cervical right vertebral artery with mild distal reconstitution in the distal occlusion at the V4 segment. Patient was given Plavix. Unfortunately patient had ongoing altered mental status and was therefore intubated by emergency medicine for airway protection. Patient was subsequently found to be COVID-positive. He will now be admitted to the ICU for further work-up and management. Discussed plan of care with sister Oliver Ordonez. Patient remains full code.     POD:* No surgery found *    S/P:     Active Problem List:     Problem List  Never Reviewed          Codes Class    Coronary artery disease involving native coronary artery of native heart without angina pectoris (Chronic) ICD-10-CM: I25.10  ICD-9-CM: 414.01     Overview Signed 2021 12:29 PM by Becky Sun MD     Cath from [address] [place] [address]  17 for abnormal stress test  LM ok  LAD  prox with benny from RCA and distal LAD occluded  D1 prox 70  D2 prox 90  Circ pMLI  OM1 50%   RCA large, mid 40             NSTEMI (non-ST elevated myocardial infarction) ([facility] [address] ICD-10-CM: I21.4  ICD-9-CM: 410.70 Overview Signed 11/18/2021 12:30 PM by Emilio Palma MD     Peak troponin 2136 11/17/21  Hx of LAD  in 2017  EF 11/17/21 25%             Systolic CHF, acute on chronic [facility]) ICD-10-CM: I50.23  ICD-9-CM: 428.23, 428.0     Overview Signed 11/18/2021 12:31 PM by Emilio Palma MD     11/16/21    ECHO ADULT COMPLETE 11/18/2021 11/18/2021    Interpretation Summary  · LV: Estimated LVEF is 20 - 25%. Normal wall thickness. Mildly dilated left ventricle. Moderate-to-severely and segmentally reduced systolic function. Severe hypokinesis of the basal anterolateral, mid anterolateral and apical lateral wall(s). Dyskinesis of the basal inferoseptal and mid anteroseptal wall(s). · AV: Severe aortic valve focal thickening present. Moderate aortic valve sclerosis with no evidence of reduced excursion. Aortic valve leaflet calcification present. · MV: Mitral valve thickening. Mitral valve leaflet calcification without reduced excursion. Mild to moderate mitral valve regurgitation is present. · TV: Mild tricuspid valve regurgitation is present. · LA: Mildly dilated left atrium. Signed by: Emilio Palma MD on 11/18/2021 12:08 AM                Respiratory failure (HCC) ICD-10-CM: J96.90  ICD-9-CM: 518.81         Anemia in chronic kidney disease ICD-10-CM: N18.9, D63.1  ICD-9-CM: 285.21         Diabetes mellitus due to underlying condition with diabetic nephropathy ([facility] [address] ICD-10-CM: E08.21  ICD-9-CM: 249.40, 583.81         End stage renal disease ([facility] [address] ICD-10-CM: N18.6  ICD-9-CM: 585.6               Past Medical History:      has a past medical history of Anemia, Coronary artery disease involving native coronary artery of native heart without angina pectoris (11/18/2021), Depression, Diabetes ([facility] [address] Dysphagia, GERD (gastroesophageal reflux disease), Hyperlipidemia, Hypertension, Nausea & vomiting, and Stroke ([facility] [address] (5/16/2003).     Past Surgical History:      has a past surgical history that includes hx orthopaedic (7/20/2010); hx orthopaedic; hx amputation foot (Bilateral); upper gi endoscopy,biopsy (11/29/2021); and upper gi endoscopy,diagnosis (11/29/2021). Home Medications:     Prior to Admission medications    Medication Sig Start Date End Date Taking? Authorizing Provider   carvediloL (COREG) 6.25 mg tablet Take 1 Tablet by mouth two (2) times daily (with meals). 11/21/21   Reynold Wood MD   hydrALAZINE (APRESOLINE) 25 mg tablet Take 1 Tablet by mouth three (3) times daily. 11/21/21   Reynold Wood MD   isosorbide dinitrate (ISORDIL) 10 mg tablet Take 1 Tablet by mouth three (3) times daily. 11/21/21   Reynold Wood MD   amoxicillin-clavulanate (Augmentin) 875-125 mg per tablet Take 1 Tablet by mouth daily. Give after dialysis on 11/21/21   Reynold Wood MD   sevelamer carbonate (Renvela) 0.8 gram pwpk oral powder 0.8 g three (3) times daily (with meals). 5/19/21   Libertad Watt MD   atorvastatin (LIPITOR) 40 mg tablet 40 mg nightly. 11/1/21   Libertad Watt MD   azithromycin (ZITHROMAX) 250 mg tablet 250 mg. 11/13/21   Libertad Watt MD   Combigan 0.2-0.5 % drop ophthalmic solution Administer 1 Drop to left eye every twelve (12) hours. Glaucoma 11/1/21   Libertad Watt MD   chlorproMAZINE (THORAZINE) 50 mg tablet 50 mg three (3) times daily. 11/8/21   Libertad Watt MD   clopidogreL (PLAVIX) 75 mg tab 75 mg daily. 11/7/21   Libertad Watt MD   Glutose-15 40 % oral gel  10/21/21   Libertad Watt MD   fluconazole (DIFLUCAN) 100 mg tablet 100 mg Every Tues, Thur & Sat. For Candidiasis, Unspecified 11/5/21   Libertad Watt MD   gabapentin (NEURONTIN) 100 mg capsule 100 mg nightly. 10/26/21   Libertad Watt MD   insulin lispro (HUMALOG) 100 unit/mL injection by SubCUTAneous route Before breakfast, lunch, dinner and at bedtime. Inject per sliding scale, 0-200= 0; 201-250= 2 units; 251-300= 4 units; 301-350= 6 units; 351-400= 8 units; 401-999= 10 units.    Greater than or equal to 401, give 10 units and CALL MD/NP, subcutaneously before meals and at bedtime for DM. 11/15/21   Libertad Watt MD   latanoprost (XALATAN) 0.005 % ophthalmic solution Administer 1 Drop to left eye nightly. Unspecified Glaucoma 10/26/21   Libertad Watt MD   omeprazole (PRILOSEC) 40 mg capsule 40 mg daily. 11/1/21   Libertad Watt MD   sertraline (ZOLOFT) 50 mg tablet 50 mg daily. 11/1/21   Libertad Watt MD   tamsulosin (FLOMAX) 0.4 mg capsule 0.4 mg. 11/1/21   Libertad Watt MD   glucose blood VI test strips (blood glucose test) strip by Does Not Apply route See Admin Instructions. Libertad Watt MD   Doxepin 3 mg tab Take 3 mg by mouth nightly. Libertad Watt MD   glucagon (Glucagon Emergency Kit, human,) 1 mg solr by Injection route. Inject 1 gram, intramuscularly as needed for Hypoglycemia Episode Give 1 gram for Blood sugar <60. NOTIFY MD.    Libertad Watt MD   multivitamin (ONE A DAY) tablet Take 1 Tablet by mouth daily. Libertad Watt MD   senna (Senna) 8.6 mg tablet Take 1 Tablet by mouth nightly. Libertad Watt MD   sodium chloride (OCEAN) 0.65 % nasal squeeze bottle 1 [place] by Both Nostrils route three (3) times daily as needed for Congestion. Libertad Watt MD   acetaminophen (TylenoL) 325 mg tablet Take 650 mg by mouth every four (4) hours as needed for Pain. Libertad Watt MD   ascorbic acid, vitamin C, (Vitamin C) 500 mg tablet Take 500 mg by mouth two (2) times a day. Libertad Watt MD   ergocalciferol (Vitamin D2) 1,250 mcg (50,000 unit) capsule Take 50,000 Units by mouth every seven (7) days. EVERY Monday. Libertad Watt MD   zinc sulfate (ZINCATE) 50 mg zinc (220 mg) capsule Take 1 Capsule by mouth daily. Libertad Watt MD   aspirin 81 mg tablet Take 81 mg by mouth daily. Libertda Watt MD       Allergies/Social/Family History:      Allergies   Allergen Reactions    Adhesive Tape-Silicones Rash      Social History     Tobacco Use    Smoking status: Never Smoker    Smokeless tobacco: Not on file   Substance Use Topics    Alcohol use: No      No family history on file. Review of Systems:     Unable to obtain due to intubation. Objective:   Vital Signs:  Visit Vitals  /82   Pulse 80   Resp 18   SpO2 100%        No data recorded. Intake/Output:   No intake or output data in the 24 hours ending 01/08/22 0959    Physical Exam:    Physical Exam  Constitutional:       Appearance: Normal appearance. HENT:      Head: Normocephalic and atraumatic. Nose: Nose normal.      Mouth/Throat:      Mouth: Mucous membranes are dry. Eyes:      Extraocular Movements: Extraocular movements intact. Pupils: Pupils are equal, round, and reactive to light. Cardiovascular:      Rate and Rhythm: Normal rate and regular rhythm. Pulses: Normal pulses. Heart sounds: Normal heart sounds. Pulmonary:      Comments: Mechanical breath sounds  Abdominal:      General: There is no distension. Palpations: Abdomen is soft. Musculoskeletal:      Cervical back: Normal range of motion. Skin:     General: Skin is warm and dry. Capillary Refill: Capillary refill takes less than 2 seconds. Neurological:      Comments: BEVERLY   Psychiatric:      Comments: BEVERLY           LABS AND  DATA: Personally reviewed  Recent Labs     01/08/22 0315   WBC 5.4   HGB 9.6*   HCT 30.7*   *     Recent Labs     01/08/22 0315      K 3.7      CO2 29   BUN 26*   CREA 6.43*   *   CA 9.6     Recent Labs     01/08/22 0315   AP 66   TP 7.2   ALB 2.8*   GLOB 4.4*     Recent Labs     01/08/22 0315   INR 1.1   PTP 11.2*      Recent Labs     01/08/22  0752   PHI 7.40   PCO2I 49.5*   PO2I 477*   FIO2I 100     No results for input(s): CPK, CKMB, TROIQ, BNPP in the last 72 hours.     Hemodynamics:   PAP:   CO:     Wedge:   CI:     CVP:    SVR:       PVR:       Ventilator Settings:  Mode Rate Tidal Volume Pressure FiO2 PEEP   Assist control,Volume control   400 ml    40 % (based on abg) 8 cm H20     Peak airway pressure: 19 cm H2O Minute ventilation: 7.46 l/min        MEDS: Reviewed    Chest X-Ray: personally reviewed and report checked  FINDINGS: Patient has been intubated. Endotracheal tube overlies the trachea 3  cm below the level of the clavicles. Cardiac silhouette is not enlarged. The  pulmonary vasculature is normal. No pneumothorax or focal consolidation. No  pleural effusion     IMPRESSION  1. No complicating features post intubation     ECHO:  Result status: Final result  · LV: Estimated LVEF is 20 - 25%. Normal wall thickness. Mildly dilated left ventricle. Moderate-to-severely and segmentally reduced systolic function. Severe hypokinesis of the basal anterolateral, mid anterolateral and apical lateral wall(s). Dyskinesis of the basal inferoseptal and mid anteroseptal wall(s). · AV: Severe aortic valve focal thickening present. Moderate aortic valve sclerosis with no evidence of reduced excursion. Aortic valve leaflet calcification present. · MV: Mitral valve thickening. Mitral valve leaflet calcification without reduced excursion. Mild to moderate mitral valve regurgitation is present. · TV: Mild tricuspid valve regurgitation is present. · LA: Mildly dilated left atrium. Assessment & Plan:   Severe cerebrovascular disease: CTA H/N/P extensive vertebrobasilar disease. Occluded cervical right vertebral artery with some mild distal reconstitution and then distal occlusion at the V4 segment. There is a dominant left vertebral artery with severe stenosis of the V4 segment of the left vertebral artery. The basilar artery is with diffuse stenosis. Corresponding perfusion abnormalities noted in the brainstem. Neurology currently following.  -Continue aspirin  -Continue Lipitor  -Follow-up MRI of brain  -Given change in mental status that warranted intubation check CT head now  -Obtain echocardiogram  -PT, OT, ST  -Allow permissive hypertension  -EEG to rule out seizure activity    Acute hypoxic respiratory failure:  In the setting of the above. Intubated for airway protection. Patient noted to be positive for COVID although doubt this is contributory. Post intubation ABG 7.4/50/477/31. Patient was noted to have [facility] outpatient. Onset of symptoms unknown. Chest x-ray unremarkable.  -Continue to trend procalcitonin  -Continue mechanical ventilation for now, lung protective ventilation strategies  -No sedation  -Send sputum if able  -Facilitate bronchopulmonary hygiene  -Wean to extubate as mental status allows  -Low threshold for antimicrobial therapy to cover hospital acquired pneumonia, last admission November 21, 2021    Insulin-dependent type 2 diabetes:   -Check hemoglobin A1c  -Lantus and insulin sliding scale coverage    HFrEF: Last echocardiogram with a EF of 20 to 25% with motion wall abnormalities indicative of prior MI. Cardiology following during last admission. Plan for repeat echocardiogram with evaluation for defibrillator.  -Follow-up echo  -Daily weights  -Strict I's and O's  -Resume Coreg when able    Hypertension, essential: Patient is maintained on Coreg, hydralazine, and chlorthalidone  -Hold antihypertensive agents as allowing permissive hypertension    Hyperlipidemia:  -Continue Lipitor    End-stage renal disease: On dialysis Tuesday Thursday Saturday  -Consult to nephrology    Anemia of chronic disease: No evidence of acute bleed  -CTM    At risk for protein calorie deficit malnutrition:   -NG tube  -Consult to registered dietitian  -Start tube feeds    Deconditioning: In the setting of acute on chronic illness  -PT OT  -Turn and reposition  -Optimize nutrition    ICU Comprehensive Plan of Care:   Plans for this Shift:   1. Repeat head imaging  2.  Supportive care     Multidisciplinary Rounds Completed:  Pending    ABCDEF Bundle/Checklist  Pain Medications: None  Target RASS: 0 - Alert & Calm - Spontaneously pays attention to caregiver  Sedation Medications: None  CAM-ICU:  Positive  Mobility: Poor  PT/OT: PT consulted and on board and OT consulted and on board   Restraints: None needed at this time  Discussed Plan of Care (goals of care): Yes  Addressed Code Status: Full Code    CARDIOVASCULAR  Cardiac Gtts: None  SBP Goal of: > 110 mmHg  MAP Goal of: > 65 mmHg  Transfusion Trigger (Hgb): <7 g/dL    RESPIRATORY  Vent Goals:   Optimize PEEP/Ventilation/Oxygenation  Goal Tidal Volume 6 cc/kg based on IBW  Plan to Extubate: pending mental status   DVT Prophylaxis (if no, list reason): Heparin   SPO2 Goal: > 92%  Pulmonary toilet: Incentive Spirometry     GI/  Ledezma Catheter Present: No  GI Prophylaxis: protonix  Nutrition: Pending   IVFs: PIVs  Bowel Movement: Pending  Bowel Regimen: None needed at this time  Insulin: lantus + ssi     ANTIBIOTICS  Antibiotics:  none     T/L/D  Tubes: ETT  Lines: Peripheral IV  Drains: None    SPECIAL EQUIPMENT  IHD    DISPOSITION  Stay in ICU    CRITICAL CARE CONSULTANT NOTE  I had a face to face encounter with the patient, reviewed and interpreted patient data including clinical events, labs, images, vital signs, I/O's, and examined patient. I have discussed the case and the plan and management of the patient's care with the consulting services, the bedside nurses and the respiratory therapist.      NOTE OF PERSONAL INVOLVEMENT IN CARE   This patient has a high probability of imminent, clinically significant deterioration, which requires the highest level of preparedness to intervene urgently. I participated in the decision-making and personally managed or directed the management of the following life and organ supporting interventions that required my frequent assessment to treat or prevent imminent deterioration. I personally spent 60 minutes of critical care time. This is time spent at this critically ill patient's bedside actively involved in patient care as well as the coordination of care and discussions with the patient's family.   This does not include any procedural time which has been billed separately.     Jean Asa, NP  [facility]  1/8/2022

## 2022-01-08 NOTE — PROGRESS NOTES
NIHSS Code Stroke Documentation      Person Administering Scale: Constance Welch NP    TIME: 02:49 AM    LKW: 01:00 AM    PMH: Diabetes, depression, stroke with left-sided deficit and dysarthria, hypertension, hyperlipidemia, CAD, ESRD, and BKA    SUBJECTIVE: The patient was transported from [facility] for rehab via EMS to [facility] with unresponsiveness. Per report, patient fell out of bed around 01:00 AM however, patient was laid back in bed and around 2 AM, the nurse petitioner at the facility recommended transferring patient to the emergency department for further evaluation. When staff went to check on patient at 2 AM, patient was unresponsive. Patient's blood pressure was 136/82 and blood sugar of 222. When patient arrived to the ED, he was taken straight to the CT. CTH showed chronic small vessel ischemic disease. No acute intracranial abnormality. The patient was evaluated by telemetry neurologist Dr. Reynaldo Contreras and was deemed not a tPA candidate. The patient started following commands and was alert and oriented to self, place, and the year. Patient was able to move his right-sided with limited movement on the left-sided from old stroke. Noted to have some exp aphasia and face droop and R eye was midline gaze. CTA H/N/P extensive vertebrobasilar disease. Occluded cervical right vertebral artery with some mild distal reconstitution and then distal occlusion at the V4 segment. There is a dominant left vertebral artery with severe stenosis of the V4 segment of the left vertebral artery. The basilar artery is with diffuse stenosis. Corresponding perfusion abnormalities noted in the brainstem. Discussed results with Dr. Meryle Flight. No LVO noted. Patient on Aspirin. Will give 600 mg IN and Plavix 150 mg once. Lipitor 40 mg to keep LDL goal <70. A1c for glycemic control. MRI brain, Echo, PT/OT/ST. Allow permissive hypertension. NS at 500cc bolus given renal insufficiency.  Patient will need rEEG for seizure evaluation if stroke w/u neg. Patient has old stroke deficits with L weakness and speech issues  1a  Level of consciousness: 1=not alert but arousable by minor stimulation to obey, answer or respond   1b. LOC questions:  1=Answers one question correctly   1c. LOC commands: 1=Performs one task correctly   2. Best Gaze: 1=partial gaze palsy; gaze is abnormal in one or both eyes, but forced deviation or total gaze paresis is not present. 3. Visual: 0=No visual loss   4. Facial Palsy: 1=Minor paralysis (flattened nasolabial fold, asymmetric on smiling)   5a. Motor left arm: 2=Some effort against gravity, arm cannot get to or maintain (if cured) 90 (or 45) degrees, drifts down to bed, but has some effort against gravity. 5b. Motor right arm: 0=No drift, arm holds 90 (or 45) degrees for full 10 seconds   6a. Motor left leg: 3=No effort against gravity; leg falls to bed immediately   6b  Motor right le=No drift; leg holds 30-degree position for full 5 seconds. 7. Limb Ataxia: 0=Absent   8. Sensory: 1=Mild to moderate sensory loss; patient feels pinprick is less sharp or is dull on the affected side; or there is a loss of superficial pain with pinprick but patient is aware of being touched. 9. Best Language:  1=Mild to moderate aphasia; some obvious loss of fluency or facility of comprehension without significant limitation on ideas expressed or form of expression. Reduction of speech and/or comprehension, however, makes conversation about provided materials difficult or impossible. For example, in conversation about provided materials, examiner can identify picture or naming card content from patients response. 10. Dysarthria: 2=Severe; patient speech is so slurred as to be unintelligible in the absence of or out of proportion to any dysphagia, or is mute-anarthric   11.  Extinction and Inattention: 0=No abnormality    Total:    14     VAN: POSITIVE    tPA Candidate: NO    Mechanical Thrombectomy Candidate: NO    ANTIPLT/AC/ANTITHROMB: YES (ASA)    CT Results (most recent):  Results from Hospital Encounter encounter on 01/08/22    CT CODE NEURO HEAD WO CONTRAST    Narrative  EXAM: CT CODE NEURO HEAD WO CONTRAST    INDICATION: fall AMS    COMPARISON: None. CONTRAST: None. TECHNIQUE: Unenhanced CT of the head was performed using 5 mm images. Brain and  bone windows were generated. Coronal and sagittal reformats. CT dose reduction  was achieved through use of a standardized protocol tailored for this  examination and automatic exposure control for dose modulation. FINDINGS:  Ventricles are mildly prominent. .. Extensive subcortical deep white matter  infarct in the right thalamus. There is no intracranial hemorrhage, extra-axial  collection, or mass effect. The basilar cisterns are open. No CT evidence of  acute infarct. The bone windows demonstrate no abnormalities. The visualized portions of the  paranasal sinuses and mastoid air cells are clear. Impression  Chronic small vessel ischemic disease. No acute intracranial abnormality. Findings were phoned to the emergency department at the time dictation      Discussed with: ED physician, Dr. Renee Workman, ED nurse, and tele-neurologist Dr. Lexus Munoz    Time spent: 45 minutes. Clover Menezes NP  Neurocritical Care Nurse Practitioner  [phone]    Please note that this dictation was completed with Stream, the computer voice recognition software. Quite often unanticipated grammatical, syntax, homophones, and other interpretive errors are inadvertently transcribed by the computer software. Please excuse any errors that have escaped final proofreading.

## 2022-01-08 NOTE — ED PROVIDER NOTES
65-year-old male presents from nursing home with decreased responsiveness. Reportedly he got up at 1 AM and fell out of bed. Since then he has been difficult to arouse. He is COVID positive. According to EMS he is on blood thinners. He has a history of cerebral infarct with dysphagia, diabetes, bilateral AKA. His blood glucose per EMS was 222. Patient can give no history secondary to acuity of condition. Fall    Altered mental status          Past Medical History:   Diagnosis Date    Anemia     Coronary artery disease involving native coronary artery of native heart without angina pectoris 11/18/2021    Cath from [facility] 6/20/17 for abnormal stress test LM ok LAD  prox with benny from RCA and distal LAD occluded D1 prox 70 D2 prox 90 Circ pMLI OM1 50%  RCA large, mid 36    Depression     Diabetes ([facility] [address]     Dysphagia     GERD (gastroesophageal reflux disease)     Hyperlipidemia     Hypertension     Nausea & vomiting     Stroke ([facility] [address] 5/16/2003       Past Surgical History:   Procedure Laterality Date    HX AMPUTATION FOOT Bilateral     HX ORTHOPAEDIC  7/20/2010    Bunion and toe repair left foot.  HX ORTHOPAEDIC      Plate in left hip.  UPPER GI ENDOSCOPY,BIOPSY  11/29/2021         UPPER GI ENDOSCOPY,DIAGNOSIS  11/29/2021              No family history on file.     Social History     Socioeconomic History    Marital status: LEGALLY      Spouse name: Not on file    Number of children: Not on file    Years of education: Not on file    Highest education level: Not on file   Occupational History    Not on file   Tobacco Use    Smoking status: Never Smoker    Smokeless tobacco: Not on file   Substance and Sexual Activity    Alcohol use: No    Drug use: Yes     Types: Prescription    Sexual activity: Not on file   Other Topics Concern    Not on file   Social History Narrative    Not on file     Social Determinants of Health     Financial Resource Strain:     Difficulty of Paying Living Expenses: Not on file   Food Insecurity:     Worried About Running Out of Food in the Last Year: Not on file    Ran Out of Food in the Last Year: Not on file   Transportation Needs:     Lack of Transportation (Medical): Not on file    Lack of Transportation (Non-Medical): Not on file   Physical Activity:     Days of Exercise per Week: Not on file    Minutes of Exercise per Session: Not on file   Stress:     Feeling of Stress : Not on file   Social Connections:     Frequency of Communication with Friends and Family: Not on file    Frequency of Social Gatherings with Friends and Family: Not on file    Attends Quaker Services: Not on file    Active Member of [address] [place] [address] [employer] or Organizations: Not on file    Attends Club or Organization Meetings: Not on file    Marital Status: Not on file   Intimate Partner Violence:     Fear of Current or Ex-Partner: Not on file    Emotionally Abused: Not on file    Physically Abused: Not on file    Sexually Abused: Not on file   Housing Stability:     Unable to Pay for Housing in the Last Year: Not on file    Number of [place] in the Last Year: Not on file    Unstable Housing in the Last Year: Not on file         ALLERGIES: Adhesive tape-silicones    Review of Systems   Unable to perform ROS: Acuity of condition       There were no vitals filed for this visit. Physical Exam  Vitals and nursing note reviewed. Constitutional:       General: He is not in acute distress. Comments: Chronically ill-appearing, minimally responsive   HENT:      Head: Normocephalic and atraumatic. Eyes:      General: No scleral icterus. Conjunctiva/sclera: Conjunctivae normal.      Pupils: Pupils are equal, round, and reactive to light. Neck:      Trachea: No tracheal deviation. Cardiovascular:      Rate and Rhythm: Normal rate and regular rhythm. Pulmonary:      Effort: Pulmonary effort is normal. No respiratory distress.       Breath sounds: Normal breath sounds. No stridor. Abdominal:      General: There is no distension. Palpations: Abdomen is soft. Tenderness: There is no abdominal tenderness. Genitourinary:     Comments: deferred  Musculoskeletal:         General: No deformity. Cervical back: No rigidity. Skin:     General: Skin is warm and dry. Neurological:      Mental Status: He is alert. Comments: Decreased responsiveness, will awaken for noxious stimuli, oriented to self   Psychiatric:      Comments: Unable to assess          [facility]  ED Course as of 01/08/22 0714   Sat Jan 08, 2022   0442 EKG shows normal sinus rhythm at rate 80, left axis deviation, LVH, ST and T wave abnormalities in the lateral leads. Compared to November 2021 PVCs have improved, ST changes are old  [TT]   0615 Patient even less responsive. Will control airway. [TT]   2500 Spoke to nurse practitioner with ICU. She will see the patient. [TT]      ED Course User Index  [TT] Tank Yen MD       Intubation    Date/Time: 1/8/2022 7:14 AM  Performed by: Tank Yen MD  Authorized by: Tank Yen MD     Consent:     Consent obtained:  Emergent situation  Pre-procedure details:     Patient status:  Unresponsive    Mallampati score:  III    Pretreatment meds: etomidate. Paralytics:  Rocuronium  Procedure details:     Preoxygenation:  Bag valve mask    CPR in progress: no      Intubation method:  Oral    Oral intubation technique:  Video-assisted    Laryngoscope blade:   Mac 3    Tube size (mm):  8.0    Number of attempts:  1    Ventilation between attempts: no      Cricoid pressure: no      Tube visualized through cords: yes    Placement assessment:     ETT to teeth:  27    Tube secured with:  ETT weber    Breath sounds:  Equal and absent over the epigastrium    Placement verification: chest rise, condensation, CXR verification, direct visualization, equal breath sounds, ETCO2 detector and tube exhalation      CXR findings:  ETT in proper place  Post-procedure details:     Patient tolerance of procedure: Tolerated well, no immediate complications              Perfect [facility] Consult for Admission  5:16 AM    ED Room Number: ER09/09  Patient Name and age:  Mata Watts 78 y.o.  male  Working Diagnosis:   1. Vertebrobasilar insufficiency        COVID-19 Suspicion:  yes  Sepsis present:  no  Reassessment needed: N/A  Code Status:  Full Code  Readmission: no  Isolation Requirements:  yes  Recommended Level of Care:  telemetry  Department:[facility] Adult ED - 21   Other: Presents from nursing home with decreased responsiveness. History of active COVID. CTA shows basilar artery stenosis with extensive vertebrobasilar disease. No intervention per neuro interventionalists. Telemetry neurology consulted and no tPA indicated. Total critical care time spent exclusive of procedures:  34 minutes    Selvin Adhikari MD

## 2022-01-08 NOTE — CONSULTS
[facility] Consult Note        NAME: Stuart Kevin       :  1955       MRN:  [id number]     Date/Time: 2022    Risk of deterioration: medium       Assessment:    Plan:  ESRD-T//S--[facility]MC/MCV  Intubated  COVID (+)  CVA  (B) amputee  DM  Occluded (R) vertebral artery  CMO ef 20-25%  Anemia HD today-orders entered  Jaylen Morocho aware  Remains a full code  epo  Will see again on Monday  Recommend palliative consult   Asked to see to provide HD. Chart reviewed/Pt intubated    Subjective:     Chief Complaint:  Unable to give    Review of Systems: Patient was not able to provide review of systems due to mental status change/acute illness    Objective:     VITALS:   Last 24hrs VS reviewed since prior progress note.  Most recent are:  Visit Vitals  /82   Pulse 75   Resp 18   SpO2 100%     SpO2 Readings from Last 6 Encounters:   22 100%   21 99%   21 98%   21 98%   08/07/10 98%        No intake or output data in the 24 hours ending 22 1305     Telemetry Reviewed      PHYSICAL EXAM:  Intubated/in no distress  (B) amputee          Lab Data Reviewed: (see below)    Medications Reviewed: (see below)    PMH/SH reviewed - no change compared to H&P  ________________________________________  ___________________________________________________    Attending Physician: Teresa Dutta MD     ____________________________________________________  MEDICATIONS:  Current Facility-Administered Medications   Medication Dose Route Frequency    atorvastatin (LIPITOR) tablet 40 mg  40 mg Oral DAILY    clopidogreL (PLAVIX) tablet 150 mg  150 mg Oral ONCE    propofol (DIPRIVAN) 10 mg/mL infusion  0-50 mcg/kg/min (Order-Specific) IntraVENous TITRATE    sodium chloride (NS) flush 5-40 mL  5-40 mL IntraVENous Q8H    sodium chloride (NS) flush 5-40 mL  5-40 mL IntraVENous PRN    acetaminophen (TYLENOL) tablet 650 mg  650 mg Oral Q6H PRN    Or    acetaminophen (TYLENOL) suppository 650 mg  650 mg Rectal Q6H PRN  polyethylene glycol (MIRALAX) packet 17 g  17 g Oral DAILY PRN    ondansetron (ZOFRAN ODT) tablet 4 mg  4 mg Oral Q8H PRN    Or    ondansetron (ZOFRAN) injection 4 mg  4 mg IntraVENous Q6H PRN    glucose chewable tablet 16 g  4 Tablet Oral PRN    dextrose (D50W) injection syrg 12.5-25 g  25-50 mL IntraVENous PRN    glucagon (GLUCAGEN) injection 1 mg  1 mg IntraMUSCular PRN    insulin glargine (LANTUS) injection 10 Units  10 Units SubCUTAneous QHS    insulin lispro (HUMALOG) injection   SubCUTAneous Q6H    heparin (porcine) injection 5,000 Units  5,000 Units SubCUTAneous Q8H    [START ON 1/9/2022] lansoprazole (PREVACID) 3 mg/mL oral suspension 30 mg  30 mg Per NG tube ACB    albumin human 25% (BUMINATE) solution 25 g  25 g IntraVENous PRN     Current Outpatient Medications   Medication Sig    hydrALAZINE (APRESOLINE) 25 mg tablet Take 25 mg by mouth four (4) days a week. Receives 25mg in morning and 25mg in evening on Mon/Wed/Fri/Sun    hydrALAZINE (APRESOLINE) 25 mg tablet Take 25 mg by mouth four (4) days a week. Receives 25mg in morning and 25mg in evening on Mon/Wed/Fri/Sun    carvediloL (COREG) 6.25 mg tablet Take 1 Tablet by mouth two (2) times daily (with meals).  isosorbide dinitrate (ISORDIL) 10 mg tablet Take 1 Tablet by mouth three (3) times daily.  sevelamer carbonate (Renvela) 0.8 gram pwpk oral powder Take 0.8 g by mouth three (3) times daily (with meals).  atorvastatin (LIPITOR) 40 mg tablet Take 40 mg by mouth nightly.  Combigan 0.2-0.5 % drop ophthalmic solution Administer 1 Drop to left eye every twelve (12) hours. Glaucoma    chlorproMAZINE (THORAZINE) 50 mg tablet 50 mg three (3) times daily.  clopidogreL (PLAVIX) 75 mg tab Take 75 mg by mouth daily.  gabapentin (NEURONTIN) 100 mg capsule 100 mg nightly.  insulin lispro (HUMALOG) 100 unit/mL injection by SubCUTAneous route Before breakfast, lunch, dinner and at bedtime.  Inject per sliding scale, 0-200= 0; 201-250= 2 units; 251-300= 4 units; 301-350= 6 units; 351-400= 8 units; 401-999= 10 units. Greater than or equal to 401, give 10 units and CALL MD/NP, subcutaneously before meals and at bedtime for DM.  latanoprost (XALATAN) 0.005 % ophthalmic solution Administer 1 Drop to left eye nightly. Unspecified Glaucoma    omeprazole (PRILOSEC) 40 mg capsule Take 40 mg by mouth daily.  sertraline (ZOLOFT) 50 mg tablet Take 50 mg by mouth daily.  tamsulosin (FLOMAX) 0.4 mg capsule Take 0.4 mg by mouth nightly.  Doxepin 3 mg tab Take 3 mg by mouth nightly.  multivitamin (ONE A DAY) tablet Take 1 Tablet by mouth daily.  senna (Senna) 8.6 mg tablet Take 1 Tablet by mouth nightly.  sodium chloride (OCEAN) 0.65 % nasal squeeze bottle 1 [place] by Both Nostrils route three (3) times daily as needed for Congestion.  acetaminophen (TylenoL) 325 mg tablet Take 650 mg by mouth every four (4) hours as needed for Pain.  ergocalciferol (Vitamin D2) 1,250 mcg (50,000 unit) capsule Take 50,000 Units by mouth every seven (7) days. EVERY Monday.  zinc sulfate (ZINCATE) 50 mg zinc (220 mg) capsule Take 1 Capsule by mouth daily.  aspirin 81 mg tablet Take 81 mg by mouth daily.  Glutose-15 40 % oral gel     ascorbic acid, vitamin C, (Vitamin C) 500 mg tablet Take 500 mg by mouth two (2) times a day. LABS:  Recent Labs     01/08/22 0315   WBC 5.4   HGB 9.6*   HCT 30.7*   *     Recent Labs     01/08/22 0315      K 3.7      CO2 29   BUN 26*   CREA 6.43*   *   CA 9.6     Recent Labs     01/08/22 0315   ALT 13   AP 66   TBILI 0.4   TP 7.2   ALB 2.8*   GLOB 4.4*     Recent Labs     01/08/22 0315   INR 1.1   PTP 11.2*      No results for input(s): FE, TIBC, PSAT, FERR in the last 72 hours. No results for input(s): PH, PCO2, PO2 in the last 72 hours. No results for input(s): CPK, CKNDX, TROIQ in the last 72 hours.     No lab exists for component: CPKMB  Lab Results   Component Value Date/Time    Glucose (POC) 169 (H) 01/08/2022 05:44 AM    Glucose (POC) 175 (H) 11/29/2021 09:17 AM    Glucose (POC) 212 (H) 11/21/2021 12:51 PM    Glucose (POC) 213 (H) 11/21/2021 11:58 AM    Glucose (POC) 179 (H) 11/21/2021 09:05 AM    Glucose (POC) 203 (H) 11/20/2021 09:25 PM

## 2022-01-08 NOTE — PROGRESS NOTES
Admission Medication Reconciliation:    Information obtained from:  TRANSFER PAPER  RxQuery data available¹:  YES    Comments/Recommendations: Updated PTA meds/reviewed patient's allergies. 1)  Updated PTA med list from transfer paper work. Patient arrived 0200 on 1/8/22. 2)  Medication changes (since last review): Added  - none    Adjusted  - hydralazine 25mg (updated dose is 25mg in morning and evening on Mon/Wed/Fri/Sun \"off dialysis days\")  -dose route/timing    Removed  - ABX: Azithromycin from 11/21  -Fluconazole     3)  Updated drug allergy:  Added Trazodone per transfer paper work. Unable to assess adverse drug      ¹RxQuery pharmacy benefit data reflects medications filled and processed through the patient's insurance, however   this data does NOT capture whether the medication was picked up or is currently being taken by the patient. Allergies:  Adhesive tape-silicones and Trazodone    Significant PMH/Disease States:   Past Medical History:   Diagnosis Date    Anemia     Coronary artery disease involving native coronary artery of native heart without angina pectoris 11/18/2021    Cath from [address] [place] [address] 6/20/17 for abnormal stress test LM ok LAD  prox with benny from RCA and distal LAD occluded D1 prox 70 D2 prox 90 Circ pMLI OM1 50%  RCA large, mid 40    Depression     Diabetes ([address]     Dysphagia     GERD (gastroesophageal reflux disease)     Hyperlipidemia     Hypertension     Nausea & vomiting     Stroke ([facility] [address] 5/16/2003     Chief Complaint for this Admission:    Chief Complaint   Patient presents with    Fall    Altered mental status     Prior to Admission Medications:   Prior to Admission Medications   Prescriptions Last Dose Informant Taking? Combigan 0.2-0.5 % drop ophthalmic solution   Yes   Sig: Administer 1 Drop to left eye every twelve (12) hours. Glaucoma   Doxepin 3 mg tab   Yes   Sig: Take 3 mg by mouth nightly.    Glutose-15 40 % oral gel   No   acetaminophen (TylenoL) 325 mg tablet Yes   Sig: Take 650 mg by mouth every four (4) hours as needed for Pain. ascorbic acid, vitamin C, (Vitamin C) 500 mg tablet Unknown at Unknown time  No   Sig: Take 500 mg by mouth two (2) times a day. aspirin 81 mg tablet   Yes   Sig: Take 81 mg by mouth daily. atorvastatin (LIPITOR) 40 mg tablet   Yes   Sig: Take 40 mg by mouth nightly. carvediloL (COREG) 6.25 mg tablet   Yes   Sig: Take 1 Tablet by mouth two (2) times daily (with meals). chlorproMAZINE (THORAZINE) 50 mg tablet   Yes   Si mg three (3) times daily. clopidogreL (PLAVIX) 75 mg tab 2022 at Unknown time  Yes   Sig: Take 75 mg by mouth daily. ergocalciferol (Vitamin D2) 1,250 mcg (50,000 unit) capsule   Yes   Sig: Take 50,000 Units by mouth every seven (7) days. EVERY Monday. gabapentin (NEURONTIN) 100 mg capsule   Yes   Si mg nightly. hydrALAZINE (APRESOLINE) 25 mg tablet   Yes   Sig: Take 25 mg by mouth four (4) days a week. Receives 25mg in morning and 25mg in evening on Mon/Wed/Fri/Sun   hydrALAZINE (APRESOLINE) 25 mg tablet   Yes   Sig: Take 25 mg by mouth four (4) days a week. Receives 25mg in morning and 25mg in evening on Mon/Wed/Fri/Sun   insulin lispro (HUMALOG) 100 unit/mL injection   Yes   Sig: by SubCUTAneous route Before breakfast, lunch, dinner and at bedtime. Inject per sliding scale, 0-200= 0; 201-250= 2 units; 251-300= 4 units; 301-350= 6 units; 351-400= 8 units; 401-999= 10 units. Greater than or equal to 401, give 10 units and CALL MD/NP, subcutaneously before meals and at bedtime for DM.   isosorbide dinitrate (ISORDIL) 10 mg tablet   Yes   Sig: Take 1 Tablet by mouth three (3) times daily. latanoprost (XALATAN) 0.005 % ophthalmic solution   Yes   Sig: Administer 1 Drop to left eye nightly. Unspecified Glaucoma   multivitamin (ONE A DAY) tablet   Yes   Sig: Take 1 Tablet by mouth daily. omeprazole (PRILOSEC) 40 mg capsule   Yes   Sig: Take 40 mg by mouth daily.    senna (Senna) 8.6 mg tablet Yes   Sig: Take 1 Tablet by mouth nightly. sertraline (ZOLOFT) 50 mg tablet   Yes   Sig: Take 50 mg by mouth daily. sevelamer carbonate (Renvela) 0.8 gram pwpk oral powder   Yes   Sig: Take 0.8 g by mouth three (3) times daily (with meals). sodium chloride (OCEAN) 0.65 % nasal squeeze bottle   Yes   Si [place] by Both Nostrils route three (3) times daily as needed for Congestion. tamsulosin (FLOMAX) 0.4 mg capsule   Yes   Sig: Take 0.4 mg by mouth nightly. zinc sulfate (ZINCATE) 50 mg zinc (220 mg) capsule   Yes   Sig: Take 1 Capsule by mouth daily. Facility-Administered Medications: None     Please contact the main inpatient pharmacy with any questions or concerns at [phone] and we will direct you to the clinical pharmacist covering this patient's care while in-house.    Digna Mcwilliams, PHARMD

## 2022-01-08 NOTE — ED NOTES
Verbal shift change report given to Khadra Torres RN (oncoming nurse) by Surekha Bueno RN (offgoing nurse). Report included the following information SBAR, ED Summary, Intake/Output, MAR and Recent Results.

## 2022-01-09 NOTE — CONSULTS
Neurology Consult  [facility]  Neurocritical Care NP    Patient: James Abraham MRN: [id number]  SSN: [id number]    YOB: 1955  Age: 78 y.o. Sex: male        Chief Complaint: altered mental status     Subjective:      James Abraham is a 78 y.o. 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 Per review of notes, the patient fell out of bed around 1:00 AM, but went back to bed and then around 2 AM nursing staff went to check on the patient and he was noted to be unresponsive. Upon arrival to the ED, a CT of the head was performed which showed no acute process. CTA of the head and neck showed extensive vertebrobasilar disease. Occluded cervical right vertebral artery with some mild distal reconstitution in the distal occlusion of the V4 segment. There is a dominant left vertebral artery with severe stenosis of the V4 segment of the left vertebral artery. The basilar artery is small in caliber with diffuse stenosis. Chronic small vessel ischemic disease is noted. CT perfusion showed abnormalities noted in the brainstem. Patient was not a candidate for tPA. Per review of notes, CTA findings were discussed with NIS on-call MD and no large vessel occlusion was noted. The patient is on aspirin prior to admission. He was loaded with 600 mg of aspirin and 150 mg of Plavix in the ED and started on Lipitor. The patient was found to be COVID-positive. Continue to have ongoing altered mental status and was therefore intubated in the ED for airway protection. He has not been able to obtain an MRI of the brain due to his COVID-positive status.   The patient has reportedly been off sedation since this morning and remains intubated. He continues to be minimally responsive, not following commands or withdrawing to painful stimuli. Neurology is consulted for further evaluation of the patient's altered mental status. Of note, the sister also informed me that the patient also has some residual vision issues in his left eye from previous stroke. The patient failed his SBT today due to apnea episodes per report. Past Medical History:   Diagnosis Date    Anemia     Coronary artery disease involving native coronary artery of native heart without angina pectoris 11/18/2021    Cath from [facility] 6/20/17 for abnormal stress test LM ok LAD  prox with benny from RCA and distal LAD occluded D1 prox 70 D2 prox 90 Circ pMLI OM1 50%  RCA large, mid 36    Depression     Diabetes ([facility] [address]     Dysphagia     GERD (gastroesophageal reflux disease)     Hyperlipidemia     Hypertension     Nausea & vomiting     Stroke ([facility] [address] 5/16/2003     Past Surgical History:   Procedure Laterality Date    HX AMPUTATION FOOT Bilateral     HX ORTHOPAEDIC  7/20/2010    Bunion and toe repair left foot.  HX ORTHOPAEDIC      Plate in left hip.  UPPER GI ENDOSCOPY,BIOPSY  11/29/2021         UPPER GI ENDOSCOPY,DIAGNOSIS  11/29/2021           Family History: Mother: hx of heart failure  Father: passed from pulmonary issues, hx of smoking  Sister: hx of stroke   Brother: hx of stroke and seizures in the first 5 years of living and then subsided   Social History     Tobacco Use    Smoking status: Never Smoker   Substance Use Topics    Alcohol use: Socially drinks, but not currently drinking.     Sister denies patient having any illicit drug use    Current Facility-Administered Medications   Medication Dose Route Frequency Provider Last Rate Last Admin    dexmedeTOMidine in 0.9 % NaCl (PRECEDEX) 400 mcg/100 mL (4 mcg/mL) infusion soln  0.1-1.5 mcg/kg/hr IntraVENous TITRATE Gio Nina MD   Stopped at 01/09/22 0959    atorvastatin (LIPITOR) tablet 40 mg  40 mg Oral DAILY Lenoard Shirts, NP   40 mg at 01/09/22 0814    propofol (DIPRIVAN) 10 mg/mL infusion  0-50 mcg/kg/min (Order-Specific) IntraVENous TITRATE Nely Gutierres MD   Stopped at 01/09/22 0851    sodium chloride (NS) flush 5-40 mL  5-40 mL IntraVENous Q8H Farzana Wong NP   10 mL at 01/08/22 2121    sodium chloride (NS) flush 5-40 mL  5-40 mL IntraVENous PRN Harolyn [place], NP        acetaminophen (TYLENOL) tablet 650 mg  650 mg Oral Q6H PRN Harolyn [place], NP        Or    acetaminophen (TYLENOL) suppository 650 mg  650 mg Rectal Q6H PRN Mostadrian, Render Lars, NP        polyethylene glycol (MIRALAX) packet 17 g  17 g Oral DAILY PRN Tiffanie Munson, NP        ondansetron (ZOFRAN ODT) tablet 4 mg  4 mg Oral Q8H PRN Tiffanie Munson NP        Or    ondansetron (ZOFRAN) injection 4 mg  4 mg IntraVENous Q6H PRN Mostrag, Render Lars, NP        glucose chewable tablet 16 g  4 Tablet Oral PRN Roxn [place], NP        dextrose (D50W) injection syrg 12.5-25 g  25-50 mL IntraVENous PRN Mostrag, Render Lars, NP        glucagon (GLUCAGEN) injection 1 mg  1 mg IntraMUSCular PRN Tiffanie Munson NP        insulin glargine (LANTUS) injection 10 Units  10 Units SubCUTAneous QHS Farzana Wong NP   10 Units at 01/08/22 2124    insulin lispro (HUMALOG) injection   SubCUTAneous Q6H Mostrag, Render Lars, NP        lansoprazole (PREVACID) 3 mg/mL oral suspension 30 mg  30 mg Per NG tube ACB Farzana Wong NP   30 mg at 01/09/22 0814    albumin human 25% (BUMINATE) solution 25 g  25 g IntraVENous PRN Mary Claros MD        epoetin glenny-epbx (RETACRIT) injection 4,000 Units  4,000 Units SubCUTAneous Q TUE, THU & SAT Mary Claros MD   4,000 Units at 01/08/22 2121    heparin (porcine) injection 5,000 Units  5,000 Units SubCUTAneous Q12H Mary Claros MD   5,000 Units at 01/09/22 1636    aspirin tablet 325 mg  325 mg Oral DAILY Papa Oxford, NP   325 mg at 01/09/22 7206    clopidogreL (PLAVIX) tablet 75 mg  75 mg Oral DAILY Papa Oxford, NP   75 mg at 01/09/22 1226        Allergies   Allergen Reactions    Adhesive Tape-Silicones Rash    Trazodone Unknown (comments)     Listed in transfer paper 01/08/22       Review of Systems:  Review of systems not obtained due to patient factors. Patient intubated with AMS. Objective:     Vitals:    01/09/22 1100 01/09/22 1200 01/09/22 1334 01/09/22 1646   BP: 100/74 (!) 82/53     Pulse: 94 99 95 92   Resp: 16 14 14 16   Temp:  97.9 °F (36.6 °C)     TempSrc:       SpO2: 100% 99% 100% 100%   Weight:       Height:            Physical Exam:  GENERAL: NAD, intubated, eyes slightly open to voice   EYE: conjunctivae/corneas clear  NECK: neck supple and symmetrical   LUNG: clear to auscultation bilaterally  HEART: regular rate and rhythm, S1, S2 normal, no murmur, click, rub or gallop  ABDOMEN: soft, non-tender. Bowel sounds normal. No masses,  no organomegaly  EXTREMITIES:  extremities normal, atraumatic, no cyanosis, noted some swelling in left arm above IV site, otherwise no significant abnormalities, bilateral BKA  SKIN: Skin warm to touch. Appropriate for ethnicity. Neurologic Exam:  Mental Status:  Eyes will open slightly to voice and stimulus. Unable to answer orientation questions     Language:    Intubated. Unable to assess. Cranial Nerves:        Pupils equal, right eye slightly irregularly shaped. Both pupils react to light. Blinks to threat bilaterally. + corneal reflexes, +cough     Eyes disconjugate, eyes will slightly deviate inward (R >L). Able to track with eyes with Doll's maneuver, however partial gaze palsy with right eye with Doll's eye maneuver when looking to the right      No obvious facial asymmetry with ETT in place. .    Motor:    No withdrawal to pain in extremities. Bilateral BKA. Noted muscle flicker in RLE.  Spontaneously moved RUE and gripped with right hand, but not on command. Left hand contracted. No command following. No involuntary movements. Sensation:    No withdrawal to pain in extremities. Reflexes:    Unable to assess Babinski's reflex. Coordination & Gait: Unable to assess due to patient condition. Recent Results (from the past 24 hour(s))   GLUCOSE, POC    Collection Time: 01/08/22  5:04 PM   Result Value Ref Range    Glucose (POC) 161 (H) 65 - 117 mg/dL    Performed by Maryuri Elmore (Trvlr)    MAGNESIUM    Collection Time: 01/08/22  6:28 PM   Result Value Ref Range    Magnesium 2.4 1.6 - 2.4 mg/dL   METABOLIC PANEL, BASIC    Collection Time: 01/08/22  6:28 PM   Result Value Ref Range    Sodium 137 136 - 145 mmol/L    Potassium 4.0 3.5 - 5.1 mmol/L    Chloride 101 97 - 108 mmol/L    CO2 28 21 - 32 mmol/L    Anion gap 8 5 - 15 mmol/L    Glucose 160 (H) 65 - 100 mg/dL    BUN 29 (H) 6 - 20 MG/DL    Creatinine 7.16 (H) 0.70 - 1.30 MG/DL    BUN/Creatinine ratio 4 (L) 12 - 20      GFR est AA 9 (L) >60 ml/min/1.73m2    GFR est non-AA 8 (L) >60 ml/min/1.73m2    Calcium 9.3 8.5 - 10.1 MG/DL   PHOSPHORUS    Collection Time: 01/08/22  6:28 PM   Result Value Ref Range    Phosphorus 3.2 2.6 - 4.7 MG/DL   PROCALCITONIN    Collection Time: 01/08/22  6:28 PM   Result Value Ref Range    Procalcitonin 0.24 ng/mL   C REACTIVE PROTEIN, QT    Collection Time: 01/08/22  6:28 PM   Result Value Ref Range    C-Reactive protein 2.61 (H) 0.00 - 0.60 mg/dL   HEP B SURFACE AG    Collection Time: 01/08/22  6:28 PM   Result Value Ref Range    Hepatitis B surface Ag <0.10 Index    Hep B surface Ag Interp. Negative NEG     HEP B SURFACE AB    Collection Time: 01/08/22  6:28 PM   Result Value Ref Range    Hepatitis B surface Ab 76.73 mIU/mL    Hep B surface Ab Interp.  REACTIVE (A) NR     SAMPLES BEING HELD    Collection Time: 01/08/22  6:28 PM   Result Value Ref Range    SAMPLES BEING HELD 1 BLUE 1LAV 1RED     COMMENT        Add-on orders for these samples will be processed based on acceptable specimen integrity and analyte stability, which may vary by analyte. GLUCOSE, POC    Collection Time: 01/08/22  9:19 PM   Result Value Ref Range    Glucose (POC) 129 (H) 65 - 117 mg/dL    Performed by [address] [place] [address], POC    Collection Time: 01/09/22 12:01 AM   Result Value Ref Range    Glucose (POC) 135 (H) 65 - 117 mg/dL    Performed by Stacy [address]agen    Collection Time: 01/09/22  2:03 AM   Result Value Ref Range    Magnesium 2.6 (H) 1.6 - 2.4 mg/dL   PHOSPHORUS    Collection Time: 01/09/22  2:03 AM   Result Value Ref Range    Phosphorus 3.6 2.6 - 4.7 MG/DL   METABOLIC PANEL, COMPREHENSIVE    Collection Time: 01/09/22  2:03 AM   Result Value Ref Range    Sodium 137 136 - 145 mmol/L    Potassium 3.9 3.5 - 5.1 mmol/L    Chloride 103 97 - 108 mmol/L    CO2 26 21 - 32 mmol/L    Anion gap 8 5 - 15 mmol/L    Glucose 139 (H) 65 - 100 mg/dL    BUN 32 (H) 6 - 20 MG/DL    Creatinine 7.61 (H) 0.70 - 1.30 MG/DL    BUN/Creatinine ratio 4 (L) 12 - 20      GFR est AA 9 (L) >60 ml/min/1.73m2    GFR est non-AA 7 (L) >60 ml/min/1.73m2    Calcium 9.2 8.5 - 10.1 MG/DL    Bilirubin, total 0.4 0.2 - 1.0 MG/DL    ALT (SGPT) 13 12 - 78 U/L    AST (SGOT) 13 (L) 15 - 37 U/L    Alk.  phosphatase 69 45 - 117 U/L    Protein, total 7.5 6.4 - 8.2 g/dL    Albumin 3.0 (L) 3.5 - 5.0 g/dL    Globulin 4.5 (H) 2.0 - 4.0 g/dL    A-G Ratio 0.7 (L) 1.1 - 2.2     CBC WITH AUTOMATED DIFF    Collection Time: 01/09/22  2:03 AM   Result Value Ref Range    WBC 8.5 4.1 - 11.1 K/uL    RBC 3.66 (L) 4.10 - 5.70 M/uL    HGB 10.8 (L) 12.1 - 17.0 g/dL    HCT 34.2 (L) 36.6 - 50.3 %    MCV 93.4 80.0 - 99.0 FL    MCH 29.5 26.0 - 34.0 PG    MCHC 31.6 30.0 - 36.5 g/dL    RDW 16.0 (H) 11.5 - 14.5 %    PLATELET 724 (L) 579 - 400 K/uL    MPV 11.0 8.9 - 12.9 FL    NRBC 0.0 0  WBC    ABSOLUTE NRBC 0.00 0.00 - 0.01 K/uL    NEUTROPHILS 81 (H) 32 - 75 %    LYMPHOCYTES 10 (L) 12 - 49 %    MONOCYTES 6 5 - 13 %    EOSINOPHILS 3 0 - 7 %    BASOPHILS 0 0 - 1 %    IMMATURE GRANULOCYTES 0 0.0 - 0.5 %    ABS. NEUTROPHILS 6.8 1.8 - 8.0 K/UL    ABS. LYMPHOCYTES 0.8 0.8 - 3.5 K/UL    ABS. MONOCYTES 0.5 0.0 - 1.0 K/UL    ABS. EOSINOPHILS 0.3 0.0 - 0.4 K/UL    ABS. BASOPHILS 0.0 0.0 - 0.1 K/UL    ABS. IMM. GRANS. 0.0 0.00 - 0.04 K/UL    DF AUTOMATED     PROTHROMBIN TIME + INR    Collection Time: 01/09/22  2:03 AM   Result Value Ref Range    INR 1.1 0.9 - 1.1      Prothrombin time 11.3 (H) 9.0 - 11.1 sec   PTT    Collection Time: 01/09/22  2:03 AM   Result Value Ref Range    aPTT 31.0 22.1 - 31.0 sec    aPTT, therapeutic range     58.0 - 77.0 SECS   PROCALCITONIN    Collection Time: 01/09/22  2:03 AM   Result Value Ref Range    Procalcitonin 0.28 ng/mL   ASPIRIN TEST    Collection Time: 01/09/22  2:03 AM   Result Value Ref Range    Aspirin test 423 ARU   PLATELET FUNCTION, VERIFY NOW P2Y12    Collection Time: 01/09/22  2:03 AM   Result Value Ref Range    P2Y12 Plt response 254 194 - 418 PRU   GLUCOSE, POC    Collection Time: 01/09/22  6:19 AM   Result Value Ref Range    Glucose (POC) 104 65 - 117 mg/dL    Performed by Brant Simon    GLUCOSE, POC    Collection Time: 01/09/22 11:37 AM   Result Value Ref Range    Glucose (POC) 89 65 - 117 mg/dL    Performed by Katt Thomas        Imaging:  CT of Head on 1/8/2022 at 0256 shows  IMPRESSION  Chronic small vessel ischemic disease. No acute intracranial abnormality. Findings were phoned to the emergency department at the time dictation. CTA of the head and neck and CT Perfusion on 1/8/2022 at 0329 shows  IMPRESSION     1. Extensive vertebrobasilar disease. Occluded cervical right vertebral artery  with some mild distal reconstitution and then distal occlusion at the V4  segment. There is a dominant left vertebral artery with severe stenosis of the  V4 segment of the left vertebral artery. The basilar artery is small caliber  with diffuse stenosis.     2.   Corresponding perfusion abnormalities noted in the brainstem.     3. Chronic small vessel ischemic disease. CT of the Head on 1/8/2022 at 2354 shows  IMPRESSION     No acute process. Assessment:     Hospital Problems  Never Reviewed          Codes Class Noted POA    Respiratory failure [facility]) ICD-10-CM: J96.90  ICD-9-CM: 518.81  11/16/2021 Unknown          This is a 78year-old male with a PMH significant for anemia, CAD, ESRD (on dialysis), bilateral BKA, depression, diabetes, GERD, hyperlipidemia, hypertension, and remote stroke with left-sided weakness, left eye visual disturbances, and dysarthria who presented to the hospital due to unresponsiveness. He was found to be COVID-positive. CT of the head was performed which showed no acute process. CTA of the head and neck showed extensive vertebrobasilar disease. Occluded cervical right vertebral artery with some mild distal reconstitution in the distal occlusion of the V4 segment. There is a dominant left vertebral artery with severe stenosis of the V4 segment of the left vertebral artery. The basilar artery is small in caliber with diffuse stenosis. Chronic small vessel ischemic disease is noted. CT perfusion showed abnormalities noted in the brainstem. Patient was not a candidate for tPA. He remain intubated and continues to be encephalopathic after sedation was stopped this morning. Unable to have MRI of Brain at this time due to COVID status.    Plan:   Possible Ischemic CVA  - patient noted to have disconjugate gaze on exam, given extensive vertebrobasilar disease and exam there is concern that patient may possibly had a posterior circulation stroke  - obtain MRI of the Brain when able to assess for ischemia  - continue DAPT with aspirin 325 mg and Plavix 75 mg daily for stroke prevention  - Hgb A1C 6.2, management per primary team   - check lipid panel, continue high statin therapy with Lipitor 40 mg daily   - ECHO pending   - PT/OT/SLP evals when able    Acute Encephalopathy  - possibly related to COVID vs ischemic stroke vs subclinical seizures  - stat EEG ordered to rule out seizure activity  - defer initiation of AEDs at this time unless EEG is positive for seizures    Plan discussed with Dr. Rica Patricia, RN, and patient's sister. Thank you for this consult and participating in the care of this patient.       Signed By: Skip Hansen NP     January 9, 2022

## 2022-01-09 NOTE — PROGRESS NOTES
01/09/22 1333   Weaning Parameters   Spontaneous Breathing Trial Complete Yes   Resp Rate Observed 0   Ve 0   VT 0   RSBI 0   Patient trailed on SBT 5/5  Apneic   Returned to previous ventilation settings

## 2022-01-09 NOTE — PROGRESS NOTES
Transitions of care  Patient is on isolation for Covid 19 virus  Baseline:   ADLs/IDALS: wheelchair bound  Previous Home Health:NA  Previous SNF/IPR: In LTC at [facility]  ER Contact: Sister Vandana Cedillo [phone]    Reason for Admission:   Acute Hypoxic respiratory failure                 RUR Score:   22%      PCP: First and Last name:  Ravinder Sparrow MD     Name of Practice: Medical director at [facility]   Are you a current patient: Yes/No:Yes   Approximate date of last visit:    Can you do a virtual visit with your PCP: NA             Resources/supports as identified by patient/family:   Patient in [address] [place] facing patient (as identified by patient/family and CM): Finances/Medication cost?    Patient has [employer] [address] for insurance                Transportation? [employer] van through [employer] system or lack thereof? Sister Vandana Cedillo [phone]                     Living arrangements? [facility] , C            Self-care/ADLs/Cognition? Current Advanced Directive/Advance Care Plan:  Full Code      Healthcare Decision Maker:   Click here to complete HealthCare Decision Makers including selection of the Healthcare Decision Maker Relationship (ie \"Primary\")      Primary Decision MakerDstephani Mcgovern -  - [phone]    Payor Source Payor: [facility] MEDICAID / Plan: Claus PABLO [facility] / Product Type: Managed Care Medicaid /                             Plan for utilizing home health:    NA                 Transition of Care Plan:     Patient on isolation for Covid, was intubated for airway protection. Patient resides in [address] [place] [address] at [facility]. Patient uses a motorized chair r/t patient being a b/l amputee. Patient will need stretcher transport at discharge through [address] .    Care manager left a Hippa compliant message for patient's sister Vandana Cedillo [phone] to complete initial assessment. Per previous CM notes patient is in LTC at [facility]. Patient is ESRD and receives dialysis [facility] [address] [address] He is on a Tues - Thurs - Sat schedule and is transported by [facility]. Per notes the facility uses a susana lift to get patient into his motorized chair. Referral made through [facility] to [facility]. Care management will continue to follow. Sangeeta Falcon RN,Care Management  Care Management Interventions  PCP Verified by CM:  Yes (Dr Breanna Baires )  Transition of Care Consult (CM Consult): [address] [place] [address] ([facility])  MyChart Signup: No  Discharge Durable Medical Equipment: No  Physical Therapy Consult: Yes  Occupational Therapy Consult: Yes  Speech Therapy Consult: No  Support Systems: Other Family Member(s) (Sister Emmie Norman [phone])  Discharge Location  Discharge Placement: [address] [place] [address]

## 2022-01-09 NOTE — ED NOTES
Patient presents to ED via EMS for complaints of blood in urine/catheter. Had TURP procedure about a week ago. Began passing clots and blood in urine this morning. 6/18 was evaluated for UTI. Also due for checkup on Thurs. Does take ASA. Report given to Coy Collado RN. Pt resting in bed with no apparent distress.

## 2022-01-09 NOTE — DIALYSIS
Hemodialysis / [phone]    Vitals Pre Post Assessment Pre Post   BP BP: (!) 128/93 (01/09/22 0215) 121/77 LOC Intubated/Sedated Intubated/Sedated   HR Pulse (Heart Rate): 81 (01/09/22 0215) 96 Lungs Clear Clear   Resp Resp Rate: 15 (01/09/22 0215) 16 Cardiac NSR Sinus Arhythmia with PVC's    Temp Temp: 97.3 °F (36.3 °C) (01/09/22 0200) 96.5 Skin Cool, dry, visibly intact, bilateral lower extremity amputations Cold, dry, pt under Toma Hugger at this time   Weight  NA NA Edema None None   Tele status Bedside Bedside Pain  0/10 0/10     Orders   Duration: Start: 0205 End: 0505 Total: 3 hours   Dialyzer: Dialyzer/Set Up Inspection: Arnold Sanders (01/09/22 0205)   K Bath: Dialysate K (mEq/L): 3 (01/09/22 0205)   Ca Bath: Dialysate CA (mEq/L): 2.5 (01/09/22 0205)   Na: Dialysate NA (mEq/L): 140 (01/09/22 0205)   Bicarb: Dialysate HCO3 (mEq/L): 35 (01/09/22 0205)   Target Fluid Removal: Goal/Amount of Fluid to Remove (mL): 2000 mL (01/09/22 0205)     Access   Type & Location: Right lower arm AV Fistula without evidence of warmth, redness, or drainage. +thrill/+bruit. Each access site disinfected for 60 seconds per site with alcohol swabs per P&P. Cannulated with 15G needles x2 and secured with paper tape. +aspiration/+flushed. Comments: On arrival to ICU, patient noted to have propofol running in 20g in right [facility] - same side as patients fistula, just distal to the [facility]. Primary RN notified and site removed immediately and new IV established by primary RN on left arm.                                     Labs   HBsAg (Antigen) / date: Negative 01/08/22                                              HBsAb (Antibody) / date: Immune 01/08/22   Source: Epic    Obtained/Reviewed  Critical Results Called HGB   Date Value Ref Range Status   01/09/2022 10.8 (L) 12.1 - 17.0 g/dL Final     Potassium   Date Value Ref Range Status   01/08/2022 4.0 3.5 - 5.1 mmol/L Final     Calcium   Date Value Ref Range Status   01/08/2022 9.3 8.5 - 10.1 MG/DL Final     BUN   Date Value Ref Range Status   01/08/2022 29 (H) 6 - 20 MG/DL Final     Creatinine   Date Value Ref Range Status   01/08/2022 7.16 (H) 0.70 - 1.30 MG/DL Final        Meds Given   Name Dose Route                    Adequacy / Fluid    Total Liters Process: 68.8   Net Fluid Removed: 2,000 mL      Comments   Time Out Done:   (Time) 0140   Admitting Diagnosis: Covid/Unresponsive   Consent obtained/signed: Informed Consent Verified: Yes (01/09/22 0205)   Machine / RO # Machine Number: [id number] (01/09/22 0205)   Primary Nurse Rpt Pre: Kajal Chavez RN - ED / Salomon Second, RN - ICU   Primary Nurse Rpt Post: Aime Burns, RN   Pt Education: Intubated/sedated   Care Plan: Continue HD per MD orders   Pts outpatient clinic: [facility]     Tx Summary   Comments:                        Patient in ED on arrival, primary RN off the floor with another patient. No consent on chart. Primary RN back to floor and advised patient now has a room in the ICU and will be transferred before starting HD.   0140 - Patient to room at this time. 0205 - Treatment started without complication. 0400 - Infrequent PVC's noted  0430 - Sinus arhythmia with occasional PVC's present on monitor. Patient cold to the touch, primary RN advised. Tmoa aguilar brought in and placed on patient. 6239- Treatment completed without difficulty. All blood rinsed back and sites remain +aspiration/flush x2. 15g needles removed intact x2, pressure and bandages applied. Sites both remain +bruit/thrill.

## 2022-01-09 NOTE — PROGRESS NOTES
Physical Therapy 1/9/2022    PT order acknowledged, chart reviewed, pt admitted with COVID, intubated - per RN note this am 10:00 -  MD notified of hypotension; orders given for 5% albumin; pt switched to precedex for attempt to extubate; profound hypotension noted with start of precedex; per MD d/c precedex. Of Note - PLOF wheelchair bound, LTC at [facility]    Will continue to follow and eval when appropriate.     Raghavendra Lemon, PT

## 2022-01-09 NOTE — PROGRESS NOTES
0200: TRANSFER - IN REPORT:    Verbal report received from [facility] (name) on Polly Schlatter  being received from ER (unit) for routine progression of care      Report consisted of patients Situation, Background, Assessment and   Recommendations(SBAR). Information from the following report(s) SBAR, Intake/Output, MAR and Recent Results was reviewed with the receiving nurse. Opportunity for questions and clarification was provided. Assessment completed upon patients arrival to unit and care assumed. Shift Summary: Patient arrived from ER on Propofol 20mcg. Sedation turned off to assess neuro exam. Patient followed commands on right side, moved bilateral AKAs, no commands on left (contractured at baseline). Low-dose Propofol restarted. HD completed without issue.      Primary Nurse Tricia Bajwa, RN and Isabela Fuentes, RN, RN performed a dual skin assessment on this patient No impairment noted  Laureano score is 10

## 2022-01-09 NOTE — PROGRESS NOTES
0730: Bedside and Verbal shift change report given to Kvng Ferrera RN (oncoming nurse) by Barbie Malagon RN (offgoing nurse). Report included the following information SBAR, Kardex, Intake/Output, MAR, Accordion, Recent Results, Med Rec Status, Cardiac Rhythm NSR and Alarm Parameters . 7951: Pts  Leanna from [employer] at pts window; wants to retrieve patient's belongings, spoke to pts sister Chivo Martell via phone and was given permission to release belongings to [facility]; cellphone; two yellow necklaces; a chargerr; glasses and a grey ring were released to [facility]. 1000: spoke with MD about hypotension; orders given for 5% albumin; pt switched to precedex for attempt to extubate; profound hypotension noted with start of precedex; per MD d/c precedex    1930: Verbal shift change report given to Carolin Herring RN (oncoming nurse) by Kvng Ferrera RN (offgoing nurse). Report included the following information SBAR, Kardex, Intake/Output, MAR, Accordion, Recent Results, Med Rec Status, Cardiac Rhythm NSR and Alarm Parameters .

## 2022-01-09 NOTE — PROGRESS NOTES
[facility]    ICU TEAM Progress Note    Name: Jo Ramirez   : 1955   MRN: [id number]   Date: 2022           ICU Assessment     1. COVID  2. Vertebral stenosis not explaining exam change             ICU Comprehensive Plan of Care:     1. Neurological System  Change to Precedex to get better exam  MRI  ASA and Plavix    2. Cardiovascular System  Low BP   Pt has EF of 20-25 percent but cannot add beta blockade and ACE at this time due to low BP    3. Respiratory System  Wean vent today. Hopeful to get to SBT, check cuff leak and extubate    4. Renal/GI/Endocrine System  HD patient. Plan to consider HD once BP more stable  GI:  Enteral access    5. Infectious Disease  COVID-19  May consider Steroids but reason for intubation was mental status and COVID was incidental.  Having no issues ventilating pt. 6. PT/OT: Not at this time    7. Goals of Care Discussion with family:  Sister would like pt to be full code    8. Plan of Care/Code Status: Full    9. Discussed Care Plan with Bedside RN    10. Documentation of Current Medications    Subjective:   Progress Note: 2022      Reason for ICU Admission: Pt fell out of bed at nursing home    HPI:Reason for ICU Admission: Acute hypoxic respiratory failure in the setting of acute encephalopathy     Overnight Events: Presented to the emergency room for evaluation having fallen out of bed at the nursing home overnight.     HPI: The patient is a 14-year-old male with a past medical history of diabetes, depression, stroke with left-sided deficit and dysarthria, hypertension, hyperlipidemia, CAD, end-stage renal disease, and BKA who presented to the emergency department having fallen out of bed. He continued to have altered mental status and was stroke alerted upon arrival to the emergency department. He was seen and evaluated by neurology and deemed not a candidate for tPA.   CTA showed occluded cervical right vertebral artery with mild distal reconstitution in the distal occlusion at the V4 segment. Patient was given Plavix. Unfortunately patient had ongoing altered mental status and was therefore intubated by emergency medicine for airway protection.     Patient was subsequently found to be COVID-positive. He will now be admitted to the ICU for further work-up and management. Overnight Events:   1/9/2022  Pt brought up to ICU    POD:  * No surgery found *        Active Problem List:     Problem List  Never Reviewed          Codes Class    Coronary artery disease involving native coronary artery of native heart without angina pectoris (Chronic) ICD-10-CM: I25.10  ICD-9-CM: 414.01     Overview Signed 11/18/2021 12:29 PM by Lewis Hemphill MD     Cath from [facility]  6/20/17 for abnormal stress test  LM ok  LAD  prox with benny from RCA and distal LAD occluded  D1 prox 70  D2 prox 90  Circ pMLI  OM1 50%   RCA large, mid 40             NSTEMI (non-ST elevated myocardial infarction) ([facility] [address] ICD-10-CM: I21.4  ICD-9-CM: 410.70     Overview Signed 11/18/2021 12:30 PM by Lewis Hemphill MD     Peak troponin 2136 11/17/21  Hx of LAD  in 2017  EF 11/17/21 25%             Systolic CHF, acute on chronic [facility]) ICD-10-CM: I50.23  ICD-9-CM: 428.23, 428.0     Overview Signed 11/18/2021 12:31 PM by Lewis Hemphill MD     11/16/21    ECHO ADULT COMPLETE 11/18/2021 11/18/2021    Interpretation Summary  · LV: Estimated LVEF is 20 - 25%. Normal wall thickness. Mildly dilated left ventricle. Moderate-to-severely and segmentally reduced systolic function. Severe hypokinesis of the basal anterolateral, mid anterolateral and apical lateral wall(s). Dyskinesis of the basal inferoseptal and mid anteroseptal wall(s). · AV: Severe aortic valve focal thickening present. Moderate aortic valve sclerosis with no evidence of reduced excursion. Aortic valve leaflet calcification present. · MV: Mitral valve thickening.  Mitral valve leaflet calcification without reduced excursion. Mild to moderate mitral valve regurgitation is present. · TV: Mild tricuspid valve regurgitation is present. · LA: Mildly dilated left atrium. Signed by: Sherron Campbell MD on 11/18/2021 12:08 AM                Respiratory failure (HCC) ICD-10-CM: J96.90  ICD-9-CM: 518.81         Anemia in chronic kidney disease ICD-10-CM: N18.9, D63.1  ICD-9-CM: 285.21         Diabetes mellitus due to underlying condition with diabetic nephropathy ([facility] [address] ICD-10-CM: E08.21  ICD-9-CM: 249.40, 583.81         End stage renal disease ([facility] [address] ICD-10-CM: N18.6  ICD-9-CM: 585.6               Past Medical History:      has a past medical history of Anemia, Coronary artery disease involving native coronary artery of native heart without angina pectoris (11/18/2021), Depression, Diabetes ([facility] Dysphagia, GERD (gastroesophageal reflux disease), Hyperlipidemia, Hypertension, Nausea & vomiting, and Stroke ([facility] (5/16/2003). Past Surgical History:      has a past surgical history that includes hx orthopaedic (7/20/2010); hx orthopaedic; hx amputation foot (Bilateral); upper gi endoscopy,biopsy (11/29/2021); and upper gi endoscopy,diagnosis (11/29/2021). Home Medications:     Prior to Admission medications    Medication Sig Start Date End Date Taking? Authorizing Provider   hydrALAZINE (APRESOLINE) 25 mg tablet Take 25 mg by mouth four (4) days a week. Receives 25mg in morning and 25mg in evening on Mon/Wed/Fri/Sun   Yes Provider, Historical   hydrALAZINE (APRESOLINE) 25 mg tablet Take 25 mg by mouth four (4) days a week. Receives 25mg in morning and 25mg in evening on Mon/Wed/Fri/Sun   Yes Provider, Historical   carvediloL (COREG) 6.25 mg tablet Take 1 Tablet by mouth two (2) times daily (with meals). 11/21/21  Yes Argenis Rdz MD   isosorbide dinitrate (ISORDIL) 10 mg tablet Take 1 Tablet by mouth three (3) times daily.  11/21/21  Yes Argenis Rdz MD   sevelamer carbonate (Renvela) 0.8 gram pwpk oral powder Take 0.8 g by mouth three (3) times daily (with meals). 5/19/21  Yes Other, MD Libertad   atorvastatin (LIPITOR) 40 mg tablet Take 40 mg by mouth nightly. 11/1/21  Yes Other, MD Libertad   Combigan 0.2-0.5 % drop ophthalmic solution Administer 1 Drop to left eye every twelve (12) hours. Glaucoma 11/1/21  Yes Other, MD Libertad   chlorproMAZINE (THORAZINE) 50 mg tablet 50 mg three (3) times daily. 11/8/21  Yes Other, MD Libertad   clopidogreL (PLAVIX) 75 mg tab Take 75 mg by mouth daily. 11/7/21  Yes Other, MD Libertad   gabapentin (NEURONTIN) 100 mg capsule 100 mg nightly. 10/26/21  Yes Other, MD Libertad   insulin lispro (HUMALOG) 100 unit/mL injection by SubCUTAneous route Before breakfast, lunch, dinner and at bedtime. Inject per sliding scale, 0-200= 0; 201-250= 2 units; 251-300= 4 units; 301-350= 6 units; 351-400= 8 units; 401-999= 10 units. Greater than or equal to 401, give 10 units and CALL MD/NP, subcutaneously before meals and at bedtime for DM. 11/15/21  Yes Other, MD Libertad   latanoprost (XALATAN) 0.005 % ophthalmic solution Administer 1 Drop to left eye nightly. Unspecified Glaucoma 10/26/21  Yes Other, MD Libertad   omeprazole (PRILOSEC) 40 mg capsule Take 40 mg by mouth daily. 11/1/21  Yes Other, MD Libertad   sertraline (ZOLOFT) 50 mg tablet Take 50 mg by mouth daily. 11/1/21  Yes Other, MD Libertad   tamsulosin (FLOMAX) 0.4 mg capsule Take 0.4 mg by mouth nightly. 11/1/21  Yes Other, MD Libertad   Doxepin 3 mg tab Take 3 mg by mouth nightly. Yes Other, MD Libertad   multivitamin (ONE A DAY) tablet Take 1 Tablet by mouth daily. Yes Other, MD Libertad   senna (Senna) 8.6 mg tablet Take 1 Tablet by mouth nightly. Yes Other, MD Libertad   sodium chloride (OCEAN) 0.65 % nasal squeeze bottle 1 [place] by Both Nostrils route three (3) times daily as needed for Congestion. Yes Other, MD Libertad   acetaminophen (TylenoL) 325 mg tablet Take 650 mg by mouth every four (4) hours as needed for Pain.    Yes Other, MD Libertad   ergocalciferol (Vitamin D2) 1,250 mcg (50,000 unit) capsule Take 50,000 Units by mouth every seven (7) days. EVERY Monday. Yes Shukri, MD Libertad   zinc sulfate (ZINCATE) 50 mg zinc (220 mg) capsule Take 1 Capsule by mouth daily. Yes Shukri, MD [facility]   aspirin 81 mg tablet Take 81 mg by mouth daily. Yes Shukri, MD [facility]   Glutose-15 40 % oral gel  10/21/21   Other, MD Libertad   ascorbic acid, vitamin C, (Vitamin C) 500 mg tablet Take 500 mg by mouth two (2) times a day. Other, MD Libertad       Allergies/Social/Family History: Allergies   Allergen Reactions    Adhesive Tape-Silicones Rash    Trazodone Unknown (comments)     Listed in transfer paper 22      Social History     Tobacco Use    Smoking status: Never Smoker    Smokeless tobacco: Not on file   Substance Use Topics    Alcohol use: No      No family history on file. Review of Systems:     Pt intubated and sedated. Unable to give ROS    Objective:   Vital Signs:  Visit Vitals  /73   Pulse 98   Temp 97.6 °F (36.4 °C)   Resp 14   Ht 6' 3\" (1.905 m)   Wt 80 kg (176 lb 5.9 oz)   SpO2 99%   BMI 22.04 kg/m²      O2 Device: Endotracheal tube Temp (24hrs), Av.2 °F (36.2 °C), Min:96.5 °F (35.8 °C), Max:97.6 °F (36.4 °C)           Intake/Output:     Intake/Output Summary (Last 24 hours) at 2022 0833  Last data filed at 2022 0505  Gross per 24 hour   Intake 108.28 ml   Output 2000 ml   Net -1891.72 ml       Physical Exam:    Elderly male in bed in [facility]. Has excellent cough reflex.   CV RRR  Pulm No rales appreciated  GI Abd soft  Skin warm, dry    LABS AND  DATA: Personally reviewed  Recent Labs     22  0203 22  0315   WBC 8.5 5.4   HGB 10.8* 9.6*   HCT 34.2* 30.7*   * 118*     Recent Labs     22  0203 22  1828    137   K 3.9 4.0    101   CO2 26 28   BUN 32* 29*   CREA 7.61* 7.16*   * 160*   CA 9.2 9.3   MG 2.6* 2.4   PHOS 3.6 3.2     Recent Labs     223 225   AP 69 66   TP 7.5 7.2   ALB 3.0* 2.8*   GLOB 4.5* 4.4*     Recent Labs     01/09/22  0203 01/08/22  0315   INR 1.1 1.1   PTP 11.3* 11.2*   APTT 31.0  --       Recent Labs     01/08/22  0752   PHI 7.40   PCO2I 49.5*   PO2I 477*   FIO2I 100     No results for input(s): CPK, CKMB, TROIQ, BNPP in the last 72 hours. Ventilator Settings:  Mode Rate Tidal Volume Pressure FiO2 PEEP   Assist control,Volume control  16 600 ml    30 % 5 cm H20     Peak airway pressure: 24 cm H2O    Minute ventilation: 8.71 l/min        MEDS: Reviewed    Chest X-Ray:  CXR Results  (Last 48 hours)               01/08/22 0712  XR CHEST PORT Final result    Impression:  1. No complicating features post intubation           Narrative:  INDICATION:  intubation verification        EXAM: Portable chest 0707 hours. Comparison 11/16/2021       FINDINGS: Patient has been intubated. Endotracheal tube overlies the trachea 3   cm below the level of the clavicles. Cardiac silhouette is not enlarged. The   pulmonary vasculature is normal. No pneumothorax or focal consolidation. No   pleural effusion                   Multidisciplinary Rounds Completed:  No    ABCDEF Bundle/Checklist Completed:  Yes        CRITICAL CARE CONSULTANT NOTE  I had a face to face encounter with the patient, reviewed and interpreted patient data including clinical events, labs, images, vital signs, I/O's, and examined patient. I have discussed the case and the plan and management of the patient's care with the consulting services, the bedside nurses and the respiratory therapist.      NOTE OF PERSONAL INVOLVEMENT IN CARE   This patient has a high probability of imminent, clinically significant deterioration, which requires the highest level of preparedness to intervene urgently. I participated in the decision-making and personally managed or directed the management of the following life and organ supporting interventions that required my frequent assessment to treat or prevent imminent deterioration.     I personally spent 45 minutes of critical care time. This is time spent at this critically ill patient's bedside actively involved in patient care as well as the coordination of care. This does not include any procedural time which has been billed separately.     Cipriano Vargas MD  Staff Intensivist/Neurointensivist  [facility]  1/9/2022

## 2022-01-09 NOTE — ED NOTES
Spoke with RT regarding needing to take patient to CT. RT to come back and check with primary RN after shift change.

## 2022-01-09 NOTE — ROUTINE PROCESS
Emergency Room Outgoing Transfer Nursing Note      Verbal and/or Written report given to Rui Phillips, RN by Timo Mera, RN on Annalee Garcia a 78 y.o. male who was admitted on 1/8/2022  2:52 AM and being transferred for routine progression of care. Report consisted of the following information SBAR, Kardex, MAR and Recent Results and the information was reviewed with the receiving nurse. Code Status: Full Code      Chief Complaint: Fall and Altered mental status      Admit Diagnosis: Respiratory failure ([facility] [address] [J96.90]      Admitting Provider: Indiana Thomas MD      Surgery: * No surgery found *       Infections: COVID-19      Allergies: Adhesive tape-silicones and Trazodone      Current diet: ADULT TUBE FEEDING Nasogastric; Renal; Delivery Method: Continuous; Continuous Initial Rate (mL/hr): 20; Continuous Advance Tube Feeding: No; Water Flush Volume (mL): 30; Water Flush Frequency: Q 4 hours      Lines:   Peripheral IV 01/08/22 Right Antecubital (Active)       Peripheral IV 01/07/22 Left; Inner Antecubital (Active)                Vital Signs:     Patient Vitals for the past 12 hrs:   Temp Pulse Resp BP SpO2   01/09/22 0100  77 18 (!) 142/86 100 %   01/09/22 0030  78 19 (!) 143/83 100 %   01/09/22 0000  80 20 (!) 151/89 100 %   01/08/22 2355  90 18  100 %   01/08/22 2330  87 18 (!) 151/99 100 %   01/08/22 2300  88 18 (!) 150/96 100 %   01/08/22 2230  87 20 (!) 153/96 100 %   01/08/22 2200  77 18 (!) 151/95 100 %   01/08/22 2130  76 18 (!) 145/97 100 %   01/08/22 2100  68 (!) 0 124/79 100 %   01/08/22 2030  75 (!) 0 (!) 146/90 100 %   01/08/22 2000  74 (!) 5 (!) 146/87 100 %   01/08/22 1957  75 18  100 %   01/08/22 1900  73 18 138/80 100 %   01/08/22 1718 97.6 °F (36.4 °C) 76 18 (!) 145/88 100 %   01/08/22 1700  75 16 (!) 142/93 100 %   01/08/22 1600    135/86    01/08/22 1521  81 18  99 %   01/08/22 1500  83 21 (!) 144/85    01/08/22 1400  77 18 129/84 98 % 01/08/22 1344     99 %   01/08/22 1330  77 11 (!) 150/90 100 %           Intake & Output:   No intake or output data in the 24 hours ending 01/09/22 0128       Laboratory Results:     Recent Results (from the past 12 hour(s))   GLUCOSE, POC    Collection Time: 01/08/22  5:04 PM   Result Value Ref Range    Glucose (POC) 161 (H) 65 - 117 mg/dL    Performed by Danii Cavazos ([facility])    MAGNESIUM    Collection Time: 01/08/22  6:28 PM   Result Value Ref Range    Magnesium 2.4 1.6 - 2.4 mg/dL   METABOLIC PANEL, BASIC    Collection Time: 01/08/22  6:28 PM   Result Value Ref Range    Sodium 137 136 - 145 mmol/L    Potassium 4.0 3.5 - 5.1 mmol/L    Chloride 101 97 - 108 mmol/L    CO2 28 21 - 32 mmol/L    Anion gap 8 5 - 15 mmol/L    Glucose 160 (H) 65 - 100 mg/dL    BUN 29 (H) 6 - 20 MG/DL    Creatinine 7.16 (H) 0.70 - 1.30 MG/DL    BUN/Creatinine ratio 4 (L) 12 - 20      GFR est AA 9 (L) >60 ml/min/1.73m2    GFR est non-AA 8 (L) >60 ml/min/1.73m2    Calcium 9.3 8.5 - 10.1 MG/DL   PHOSPHORUS    Collection Time: 01/08/22  6:28 PM   Result Value Ref Range    Phosphorus 3.2 2.6 - 4.7 MG/DL   PROCALCITONIN    Collection Time: 01/08/22  6:28 PM   Result Value Ref Range    Procalcitonin 0.24 ng/mL   C REACTIVE PROTEIN, QT    Collection Time: 01/08/22  6:28 PM   Result Value Ref Range    C-Reactive protein 2.61 (H) 0.00 - 0.60 mg/dL   HEP B SURFACE AG    Collection Time: 01/08/22  6:28 PM   Result Value Ref Range    Hepatitis B surface Ag <0.10 Index    Hep B surface Ag Interp. Negative NEG     HEP B SURFACE AB    Collection Time: 01/08/22  6:28 PM   Result Value Ref Range    Hepatitis B surface Ab 76.73 mIU/mL    Hep B surface Ab Interp.  REACTIVE (A) NR     SAMPLES BEING HELD    Collection Time: 01/08/22  6:28 PM   Result Value Ref Range    SAMPLES BEING HELD 1 BLUE 1LAV 1RED     COMMENT        Add-on orders for these samples will be processed based on acceptable specimen integrity and analyte stability, which may vary by analyte. GLUCOSE, POC    Collection Time: 01/08/22  9:19 PM   Result Value Ref Range    Glucose (POC) 129 (H) 65 - 117 mg/dL    Performed by [address] [place] [address], POC    Collection Time: 01/09/22 12:01 AM   Result Value Ref Range    Glucose (POC) 135 (H) 65 - 117 mg/dL    Performed by Angi Tse            Patient transported with : Registered Nurse     Opportunity for questions and clarifications were provided.          Anupam Baird RN, KHUSHBU, BSN, VIA [facility]     1/9/2022, 1:28 AM

## 2022-01-10 NOTE — PROGRESS NOTES
[facility]    ICU TEAM Progress Note    Name: Daniela Maher   : 1955   MRN: [id number]   Date: 1/10/2022           ICU Assessment     1. COVID  2. Vertebral stenosis not explaining exam change             ICU Comprehensive Plan of Care:     1. Neurological System  EEG  Neuro has seen and rec EEG but not to start AEDs yet  ASA and Plavix    2. Cardiovascular System  BP improved  Pt has EF of 20-25 percent but cannot add beta blockade and ACE at this time due to low BP    3. Respiratory System  Attempt wean after EEG and HD    4. Renal/GI/Endocrine System  HD patient. Phos low today. GI:  Enteral access    5. Infectious Disease  COVID-19  May consider Steroids but reason for intubation was mental status and COVID was incidental.  Having no issues ventilating pt. 6. PT/OT: Not at this time    7. Goals of Care Discussion with family:  Sister would like pt to be full code    8. Plan of Care/Code Status: Full    9. Discussed Care Plan with Bedside RN    10. Documentation of Current Medications    Subjective:   Progress Note: 1/10/2022      Reason for ICU Admission: Pt fell out of bed at nursing home    HPI:Reason for ICU Admission: Acute hypoxic respiratory failure in the setting of acute encephalopathy     Overnight Events: Presented to the emergency room for evaluation having fallen out of bed at the nursing home overnight.     HPI: The patient is a 48-year-old male with a past medical history of diabetes, depression, stroke with left-sided deficit and dysarthria, hypertension, hyperlipidemia, CAD, end-stage renal disease, and BKA who presented to the emergency department having fallen out of bed. He continued to have altered mental status and was stroke alerted upon arrival to the emergency department. He was seen and evaluated by neurology and deemed not a candidate for tPA.   CTA showed occluded cervical right vertebral artery with mild distal reconstitution in the distal occlusion at the V4 segment. Patient was given Plavix. Unfortunately patient had ongoing altered mental status and was therefore intubated by emergency medicine for airway protection.     Patient was subsequently found to be COVID-positive. He will now be admitted to the ICU for further work-up and management. Overnight Events:   1/10/2022  Await EEG, as he did not breathe over vent yesterday  May be due to COVID encephalopathy    POD:  * No surgery found *        Active Problem List:     Problem List  Never Reviewed          Codes Class    Coronary artery disease involving native coronary artery of native heart without angina pectoris (Chronic) ICD-10-CM: I25.10  ICD-9-CM: 414.01     Overview Signed 11/18/2021 12:29 PM by Donny Newton MD     Cath from [facility]  6/20/17 for abnormal stress test  LM ok  LAD  prox with benny from RCA and distal LAD occluded  D1 prox 70  D2 prox 90  Circ pMLI  OM1 50%   RCA large, mid 40             NSTEMI (non-ST elevated myocardial infarction) ([facility] [address] ICD-10-CM: I21.4  ICD-9-CM: 410.70     Overview Signed 11/18/2021 12:30 PM by Donny Newton MD     Peak troponin 2136 11/17/21  Hx of LAD  in 2017  EF 11/17/21 25%             Systolic CHF, acute on chronic [facility]) ICD-10-CM: I50.23  ICD-9-CM: 428.23, 428.0     Overview Signed 11/18/2021 12:31 PM by Donny Newton MD     11/16/21    ECHO ADULT COMPLETE 11/18/2021 11/18/2021    Interpretation Summary  · LV: Estimated LVEF is 20 - 25%. Normal wall thickness. Mildly dilated left ventricle. Moderate-to-severely and segmentally reduced systolic function. Severe hypokinesis of the basal anterolateral, mid anterolateral and apical lateral wall(s). Dyskinesis of the basal inferoseptal and mid anteroseptal wall(s). · AV: Severe aortic valve focal thickening present. Moderate aortic valve sclerosis with no evidence of reduced excursion. Aortic valve leaflet calcification present. · MV: Mitral valve thickening.  Mitral valve leaflet calcification without reduced excursion. Mild to moderate mitral valve regurgitation is present. · TV: Mild tricuspid valve regurgitation is present. · LA: Mildly dilated left atrium. Signed by: Brooke Durán MD on 11/18/2021 12:08 AM                Respiratory failure (HCC) ICD-10-CM: J96.90  ICD-9-CM: 518.81         Anemia in chronic kidney disease ICD-10-CM: N18.9, D63.1  ICD-9-CM: 285.21         Diabetes mellitus due to underlying condition with diabetic nephropathy ([facility] [address] ICD-10-CM: E08.21  ICD-9-CM: 249.40, 583.81         End stage renal disease ([facility] [address] ICD-10-CM: N18.6  ICD-9-CM: 585.6               Past Medical History:      has a past medical history of Anemia, Coronary artery disease involving native coronary artery of native heart without angina pectoris (11/18/2021), Depression, Diabetes ([facility] [address] Dysphagia, GERD (gastroesophageal reflux disease), Hyperlipidemia, Hypertension, Nausea & vomiting, and Stroke ([facility] [address] (5/16/2003). Past Surgical History:      has a past surgical history that includes hx orthopaedic (7/20/2010); hx orthopaedic; hx amputation foot (Bilateral); upper gi endoscopy,biopsy (11/29/2021); and upper gi endoscopy,diagnosis (11/29/2021). Home Medications:     Prior to Admission medications    Medication Sig Start Date End Date Taking? Authorizing Provider   hydrALAZINE (APRESOLINE) 25 mg tablet Take 25 mg by mouth four (4) days a week. Receives 25mg in morning and 25mg in evening on Mon/Wed/Fri/Sun   Yes Provider, Historical   hydrALAZINE (APRESOLINE) 25 mg tablet Take 25 mg by mouth four (4) days a week. Receives 25mg in morning and 25mg in evening on Mon/Wed/Fri/Sun   Yes Provider, Historical   carvediloL (COREG) 6.25 mg tablet Take 1 Tablet by mouth two (2) times daily (with meals). 11/21/21  Yes Sandrita Rivero MD   isosorbide dinitrate (ISORDIL) 10 mg tablet Take 1 Tablet by mouth three (3) times daily.  11/21/21  Yes Sandrita Rivero MD   sevelamer carbonate (Renvela) 0.8 gram pwpk oral powder Take 0.8 g by mouth three (3) times daily (with meals). 5/19/21  Yes Other, MD Libertad   atorvastatin (LIPITOR) 40 mg tablet Take 40 mg by mouth nightly. 11/1/21  Yes Other, MD Libertad   Combigan 0.2-0.5 % drop ophthalmic solution Administer 1 Drop to left eye every twelve (12) hours. Glaucoma 11/1/21  Yes Other, MD Libertad   chlorproMAZINE (THORAZINE) 50 mg tablet 50 mg three (3) times daily. 11/8/21  Yes Other, MD Libertad   clopidogreL (PLAVIX) 75 mg tab Take 75 mg by mouth daily. 11/7/21  Yes Other, MD Libertad   gabapentin (NEURONTIN) 100 mg capsule 100 mg nightly. 10/26/21  Yes Other, MD Libertad   insulin lispro (HUMALOG) 100 unit/mL injection by SubCUTAneous route Before breakfast, lunch, dinner and at bedtime. Inject per sliding scale, 0-200= 0; 201-250= 2 units; 251-300= 4 units; 301-350= 6 units; 351-400= 8 units; 401-999= 10 units. Greater than or equal to 401, give 10 units and CALL MD/NP, subcutaneously before meals and at bedtime for DM. 11/15/21  Yes Other, MD Libertad   latanoprost (XALATAN) 0.005 % ophthalmic solution Administer 1 Drop to left eye nightly. Unspecified Glaucoma 10/26/21  Yes Other, MD Libertad   omeprazole (PRILOSEC) 40 mg capsule Take 40 mg by mouth daily. 11/1/21  Yes Other, MD Libertad   sertraline (ZOLOFT) 50 mg tablet Take 50 mg by mouth daily. 11/1/21  Yes Other, MD Libertad   tamsulosin (FLOMAX) 0.4 mg capsule Take 0.4 mg by mouth nightly. 11/1/21  Yes Other, MD Libertad   Doxepin 3 mg tab Take 3 mg by mouth nightly. Yes Other, MD Libertad   multivitamin (ONE A DAY) tablet Take 1 Tablet by mouth daily. Yes Other, MD Libertad   senna (Senna) 8.6 mg tablet Take 1 Tablet by mouth nightly. Yes Other, MD Libertad   sodium chloride (OCEAN) 0.65 % nasal squeeze bottle 1 [place] by Both Nostrils route three (3) times daily as needed for Congestion. Yes Other, MD Libertad   acetaminophen (TylenoL) 325 mg tablet Take 650 mg by mouth every four (4) hours as needed for Pain.    Yes Shukri, MD Libertad ergocalciferol (Vitamin D2) 1,250 mcg (50,000 unit) capsule Take 50,000 Units by mouth every seven (7) days. EVERY Monday. Yes Shukri, MD Libertad   zinc sulfate (ZINCATE) 50 mg zinc (220 mg) capsule Take 1 Capsule by mouth daily. Yes Shukri, MD Liebrtad   aspirin 81 mg tablet Take 81 mg by mouth daily. Yes Shukri, MD Libertad   Glutose-15 40 % oral gel  10/21/21   Other, MD Libertad   ascorbic acid, vitamin C, (Vitamin C) 500 mg tablet Take 500 mg by mouth two (2) times a day. Other, MD Libertad       Allergies/Social/Family History: Allergies   Allergen Reactions    Adhesive Tape-Silicones Rash    Trazodone Unknown (comments)     Listed in transfer paper 22      Social History     Tobacco Use    Smoking status: Never Smoker    Smokeless tobacco: Not on file   Substance Use Topics    Alcohol use: No      No family history on file. Review of Systems:     Pt intubated and sedated. Unable to give ROS    Objective:   Vital Signs:  Visit Vitals  /63 (BP 1 Location: Left upper arm, BP Patient Position: At rest)   Pulse 74   Temp 98.3 °F (36.8 °C)   Resp 16   Ht 6' 3\" (1.905 m)   Wt 71.3 kg (157 lb 3 oz)   SpO2 100%   BMI 19.65 kg/m²      O2 Device: Endotracheal tube,Ventilator Temp (24hrs), Av.6 °F (37 °C), Min:97.5 °F (36.4 °C), Max:99.8 °F (37.7 °C)           Intake/Output:     Intake/Output Summary (Last 24 hours) at 1/10/2022 0941  Last data filed at 2022 1200  Gross per 24 hour   Intake 512.99 ml   Output    Net 512.99 ml       Physical Exam:    Elderly male in bed in NAD. Has excellent cough reflex.   CV RRR  Pulm No rales appreciated  GI Abd soft  Skin warm, dry    LABS AND  DATA: Personally reviewed  Recent Labs     01/10/22  0429 01/09/22  0203   WBC 7.0 8.5   HGB 10.5* 10.8*   HCT 32.3* 34.2*    141*     Recent Labs     01/10/22  0429 01/09/22  0203    137   K 3.2* 3.9    103   CO2 29 26   BUN 26* 32*   CREA 6.84* 7.61*   GLU 64* 139*   CA 9.2 9.2   MG 2.3 2.6* PHOS 1.7* 3.6     Recent Labs     01/09/22  0203 01/08/22  0315   AP 69 66   TP 7.5 7.2   ALB 3.0* 2.8*   GLOB 4.5* 4.4*     Recent Labs     01/09/22  0203 01/08/22  0315   INR 1.1 1.1   PTP 11.3* 11.2*   APTT 31.0  --       Recent Labs     01/08/22  0752   PHI 7.40   PCO2I 49.5*   PO2I 477*   FIO2I 100     No results for input(s): CPK, CKMB, TROIQ, BNPP in the last 72 hours. Ventilator Settings:  Mode Rate Tidal Volume Pressure FiO2 PEEP   Assist control,Volume control  16 600 ml    25 % 5 cm H20     Peak airway pressure: 17 cm H2O    Minute ventilation: 9.54 l/min        MEDS: Reviewed    Chest X-Ray:  CXR Results  (Last 48 hours)    None            Multidisciplinary Rounds Completed:  No    ABCDEF Bundle/Checklist Completed:  Yes        CRITICAL CARE CONSULTANT NOTE  I had a face to face encounter with the patient, reviewed and interpreted patient data including clinical events, labs, images, vital signs, I/O's, and examined patient. I have discussed the case and the plan and management of the patient's care with the consulting services, the bedside nurses and the respiratory therapist.      NOTE OF PERSONAL INVOLVEMENT IN CARE   This patient has a high probability of imminent, clinically significant deterioration, which requires the highest level of preparedness to intervene urgently. I participated in the decision-making and personally managed or directed the management of the following life and organ supporting interventions that required my frequent assessment to treat or prevent imminent deterioration. I personally spent 45 minutes of critical care time. This is time spent at this critically ill patient's bedside actively involved in patient care as well as the coordination of care. This does not include any procedural time which has been billed separately.     Taryn Silver MD  Staff Intensivist/Neurointensivist  [facility]  1/10/2022

## 2022-01-10 NOTE — PROGRESS NOTES
Physical Therapy  1/10/2022    Order received, chart reviewed. Attempted to see for PT evaluation, as patient's vent settings within parameters (FiO2 25% and PEEP 5) and per RN, had been following commands earlier today. Upon eval attempt, patient not rousing to noxious, tactile, verbal stimulation. Repositioned in bed and HOB elevated to 45 degrees. Moving R UE and LE but non purposeful and not on command. PT eval aborted and will f/u. Thank you.     Pushpa Newsome, PT, DPT

## 2022-01-10 NOTE — PROGRESS NOTES
Occupational Therapy  01/10/22    Order acknowledged, chart reviewed. Attempted to see patient for OT evaluation however patient on ventilator with sedation, not rousing to multimodal stimuli (repositioning, noxious stimuli, verbal & tactile facilitation). Involuntary AROM of RUE, RLE, and brief LUE noted with & without noxious stimuli, no command following or eyes opening despite all attempts. HOB elevated, VSS, support provided for L hand d/t significant flexor tone, further activity aborted. Will continue to follow and complete OT evaluation when patient is able to follow basic functional commands.      Thank you,   Lynn Newton, OTD, OTR/L

## 2022-01-10 NOTE — PROGRESS NOTES
Name: Piyush Willis   MRN: [id number]  : 1955        Assessment:                                    Plan:  ESRD-T//S--FMC/MCV  Intubated  Low K/Phos  COVID (+)  CVA  (B) amputee  DM  Occluded (R) vertebral artery  CMO ef 20-25%  Anemia HD tomm per schedule  IV KPHOS 20 mmol x 1  Not on phosphate binders    Remains a full code  Ct WARREN  EEG per Neuro        Subjective:  Seen via remote chart rev    ROS:   Deferred    Exam:  Visit Vitals  /69   Pulse 78   Temp 98.3 °F (36.8 °C)   Resp 14   Ht 6' 3\" (1.905 m)   Wt 71 kg (156 lb 8.4 oz)   SpO2 100%   BMI 19.56 kg/m²     deferred    Current Facility-Administered Medications   Medication Dose Route Frequency Last Admin    potassium phosphate 20 mmol in 0.9% sodium chloride 250 mL infusion   IntraVENous ONCE      dexmedeTOMidine in 0.9 % NaCl (PRECEDEX) 400 mcg/100 mL (4 mcg/mL) infusion soln  0.1-1.5 mcg/kg/hr IntraVENous TITRATE Stopped at 22 0959    atorvastatin (LIPITOR) tablet 40 mg  40 mg Oral DAILY 40 mg at 01/10/22 0846    propofol (DIPRIVAN) 10 mg/mL infusion  0-50 mcg/kg/min (Order-Specific) IntraVENous TITRATE Stopped at 22 0851    sodium chloride (NS) flush 5-40 mL  5-40 mL IntraVENous Q8H 10 mL at 01/10/22 7610    sodium chloride (NS) flush 5-40 mL  5-40 mL IntraVENous PRN      acetaminophen (TYLENOL) tablet 650 mg  650 mg Oral Q6H PRN      Or    acetaminophen (TYLENOL) suppository 650 mg  650 mg Rectal Q6H PRN      polyethylene glycol (MIRALAX) packet 17 g  17 g Oral DAILY PRN      ondansetron (ZOFRAN ODT) tablet 4 mg  4 mg Oral Q8H PRN      Or    ondansetron (ZOFRAN) injection 4 mg  4 mg IntraVENous Q6H PRN      glucose chewable tablet 16 g  4 Tablet Oral PRN      dextrose (D50W) injection syrg 12.5-25 g  25-50 mL IntraVENous PRN 25 g at 01/10/22 0911    glucagon (GLUCAGEN) injection 1 mg  1 mg IntraMUSCular PRN      insulin glargine (LANTUS) injection 10 Units  10 Units SubCUTAneous QHS 10 Units at 01/09/22 2112    insulin lispro (HUMALOG) injection   SubCUTAneous Q6H      lansoprazole (PREVACID) 3 mg/mL oral suspension 30 mg  30 mg Per NG tube ACB 30 mg at 01/10/22 0846    albumin human 25% (BUMINATE) solution 25 g  25 g IntraVENous PRN      epoetin glenny-epbx (RETACRIT) injection 4,000 Units  4,000 Units SubCUTAneous Q TUE, THU & SAT 4,000 Units at 01/08/22 2121    heparin (porcine) injection 5,000 Units  5,000 Units SubCUTAneous Q12H 5,000 Units at 01/10/22 0416    aspirin tablet 325 mg  325 mg Oral DAILY 325 mg at 01/10/22 0846    clopidogreL (PLAVIX) tablet 75 mg  75 mg Oral DAILY 75 mg at 01/10/22 0846       Labs/Data:    Lab Results   Component Value Date/Time    WBC 7.0 01/10/2022 04:29 AM    HGB 10.5 (L) 01/10/2022 04:29 AM    HCT 32.3 (L) 01/10/2022 04:29 AM    PLATELET 105 03/81/4652 04:29 AM    MCV 91.0 01/10/2022 04:29 AM       Lab Results   Component Value Date/Time    Sodium 139 01/10/2022 04:29 AM    Potassium 3.2 (L) 01/10/2022 04:29 AM    Chloride 101 01/10/2022 04:29 AM    CO2 29 01/10/2022 04:29 AM    Anion gap 9 01/10/2022 04:29 AM    Glucose 64 (L) 01/10/2022 04:29 AM    BUN 26 (H) 01/10/2022 04:29 AM    Creatinine 6.84 (H) 01/10/2022 04:29 AM    BUN/Creatinine ratio 4 (L) 01/10/2022 04:29 AM    GFR est AA 10 (L) 01/10/2022 04:29 AM    GFR est non-AA 8 (L) 01/10/2022 04:29 AM    Calcium 9.2 01/10/2022 04:29 AM       Wt Readings from Last 3 Encounters:   01/10/22 71 kg (156 lb 8.4 oz)   12/02/21 80 kg (176 lb 5.9 oz)   11/21/21 72.8 kg (160 lb 7.9 oz)         Intake/Output Summary (Last 24 hours) at 1/10/2022 1158  Last data filed at 1/9/2022 1200  Gross per 24 hour   Intake 512.99 ml   Output    Net 512.99 ml       Patient seen and examined. Chart reviewed. Labs, data and other pertinent notes reviewed in last 24 hrs.     PMH/SH/FH reviewed and unchanged compared to H&P    Wilfrid Pretty MD

## 2022-01-10 NOTE — PROGRESS NOTES
Order for PICC noted. Patient currently on HD, not a candidate for PICC. Recommend central line for access, awaiting response from Dr Chet Zendejas.

## 2022-01-10 NOTE — PROGRESS NOTES
Dr Renetta Domingo agrees to central line placement, order changed. Awaiting call back from ICU RN to make aware.

## 2022-01-10 NOTE — PROGRESS NOTES
Comprehensive Nutrition Assessment    Type and Reason for Visit: Initial,Consult    Nutrition Recommendations/Plan:      Start EN-suggested goal: Nepro @ 45 ml/hr with 1 packet Prosource daily and 50 ml water flush q 4 hr    Nutrition Assessment:    Pt admitted with Respiratory failure. PMHx: DM 2, CVA, HTN, HLD, CAD, ESRD, B/L BKA. Ischemic CVA-unable to do MRI d/t incidental finding COVID +. Intubated in ED for airway protection; on minimal vent settings. Encephalopathy; neurology following. EEG ordered for today. Trophic tube feeding ordered; DHT advanced this morning and in appropriate position for use. Patient appears thin (from window)-at low-end of IBW range. Phosphorus 1.7 today-pt was dialyzed yesterday. Potassium slightly BNL. Nephrology ordered KPhos. EN to start today-continue to monitor lytes daily. Expect BG to rise once on EN; low-normal on AM labs today. Suggested goal tube feeding: Nepro @ 45 ml/hr with 1 packet Prosource daily and 50 ml water flush q 4 hr. This will provide 990 ml, 1795 calories, 93 gm protein and 1070 ml free water (tube feeding/flush) per day to meet estimated needs. Malnutrition Assessment:  Malnutrition Status:  Insufficient data    Context:  Acute illness     Findings of the 6 clinical characteristics of malnutrition:   Energy Intake:  Unable to assess  Weight Loss:  No significant weight loss     Body Fat Loss:  Unable to assess,     Muscle Mass Loss:  Unable to assess,    Fluid Accumulation:  No significant fluid accumulation,     Strength:  Not performed     Nutritionally Significant Medications:   Lipitor, Retacrit, Lantus, Humalog, Prevacid, Miralax prn, K Phos    Estimated Daily Nutrient Needs:  Energy (kcal): 1775 (25 kcal/kg); Weight Used for Energy Requirements: Current  Protein (g): 90-98 (1.2-1.3g/kg);  Weight Used for Protein Requirements: Current  Fluid (ml/day):Method Used for Fluid Requirements: Standard renal    Nutrition Related Findings:       BM: 1/9  Edema: None  Wounds:  None       Current Nutrition Therapies:   Diet: NPO  Tube Feeding: Nepro @ 20 ml/hr with 30 ml water flush q 4 hr  Additional Caloric Sources:  None    Anthropometric Measures:  · Height:  6' 3\" (190.5 cm)  · Current Body Wt:  71 kg (156 lb 8.4 oz)   · Ideal Body Wt:  173# (90.5% IBW); adjusted for B/L BKA  · Adjusted BMI:  21.9    · BMI Categories:  Underweight (BMI less than 22) age over 72     Wt Readings from Last 10 Encounters:   01/10/22 71 kg (156 lb 8.4 oz)   12/02/21 80 kg (176 lb 5.9 oz)-?accurate   11/21/21 72.8 kg (160 lb 7.9 oz)   08/07/10 98 kg (216 lb)       Nutrition Diagnosis:   · Inadequate energy intake related to cognitive or neurological impairment,impaired respiratory function as evidenced by NPO or clear liquid status due to medical condition,intubation. Nutrition Interventions:   Food and/or Nutrient Delivery: Modify tube feeding  Nutrition Education and Counseling: No recommendations at this time  Coordination of Nutrition Care: Continue to monitor while inpatient,Interdisciplinary rounds    Goals: Tolerate EN at goal in next 1-2 days. Nutrition Monitoring and Evaluation:   Behavioral-Environmental Outcomes: None identified  Food/Nutrient Intake Outcomes: Enteral nutrition intake/tolerance  Physical Signs/Symptoms Outcomes:      Discharge Planning:     Too soon to determine     Jhonatan Townsend RD CNSC  Contact: Karina Carcamo

## 2022-01-10 NOTE — PROGRESS NOTES
0730 Bedside and Verbal shift change report given to Ana Howard, RN (oncoming nurse) by Holli Gottron, RN (offgoing nurse). Report included the following information SBAR, Kardex, ED Summary, Procedure Summary, Intake/Output, MAR, Recent Results and Cardiac Rhythm NSR to sinus tach . 0900: Upon assessment, BG 67. Pt given 25mL D50% IV. Repeat .  0930: Updated Dr. Ángel Morris regarding pt BG. Orders confirmed to check BG again at noon. 1000: Per MRI, they will call when able to get pt in for MRI   1015: Started renal TF at 20mL/hr with q4 30mL flush. 1030: Updated sister Judy Alonso by phone. 1500: EEG tech at bedside   1600:  Increased TF to 45mL/hr with q4 50mL flush   1730: Per MRI, scanner down and will call when ready for pt

## 2022-01-10 NOTE — PROGRESS NOTES
0100: Bedside and Verbal shift change report given to Archie RN (oncoming nurse) by Silvano Dodge RN (offgoing nurse). Report included the following information SBAR, Kardex, Intake/Output, MAR, Recent Results, Cardiac Rhythm NSR and Alarm Parameters . 0440: Patients  yayo noted to be at 60 advanced to 75 per previous placement and KUB ordered. MARIANA Barbosa notified. 0500: MARIANA Barbosa reviewed KUB and verified  yayo placement. 0540: Notified MARIANA Barbosa of patients potassium of 3.2.    0730: Bedside and Verbal shift change report given to Vin Mcrae RN (oncoming nurse) by Krista Bonilla RN (offgoing nurse). Report included the following information SBAR, Kardex, Intake/Output, MAR, Recent Results, Cardiac Rhythm NSR and Alarm Parameters .

## 2022-01-11 NOTE — PROCEDURES
ELECTROENCEPHALOGRAM REPORT    HISTORY: Patient is a 60-year-old male who is being evaluated for altered mental status. DESCRIPTION: This is an 18-channel EEG performed on an intubated, non-responsive patient. The dominant posterior background rhythm consists of medium-voltage rhythms in the 6 Hz frequency range out of the posterior head region. Photic stimulation does not elicit a significant driving response. Hyperventilation was not performed. ELECTROENCEPHALOGRAM SUMMARY: Mildly abnormal EEG due to mild slowing of the background rhythms. CLINICAL INTERPRETATION: This EEG is suggestive of some mild generalized encephalopathic process, nonspecific in type. This may be related to underlying structural brain injury and/or toxic/metabolic abnormality. No clearly lateralizing or epileptiform features were seen.      Vidhi Sanchez,   01/11/22

## 2022-01-11 NOTE — PROGRESS NOTES
Transition of Care Plan   RUR-  High Risk   Covid positive   DISPOSITION: Return to [facility] in [facility]: AMR/BLS  Patient remains in ICU vented on isolation for Covid virus, on Diprivan for sedation. Patient in [address] at [facility], referral made through [facility]. Will need BLS transport at discharge. CM updated SW with [facility].    Soraya Smart RN,Care Management

## 2022-01-11 NOTE — PROGRESS NOTES
1930: Bedside and Verbal shift change report given to Archie RN (oncoming nurse) by Ana Vitale RN (offgoing nurse). Report included the following information SBAR, Kardex, Intake/Output, MAR, Recent Results, Cardiac Rhythm NSR/ST and Alarm Parameters . 0730: Bedside and Verbal shift change report given to Ana Vitale RN (oncoming nurse) by Kingston Argueta RN (offgoing nurse). Report included the following information SBAR, Kardex, Intake/Output, MAR, Recent Results, Cardiac Rhythm NSR/ST and Alarm Parameters .

## 2022-01-11 NOTE — PROGRESS NOTES
0730 Bedside and Verbal shift change report given to Jannette Crow RN (oncoming nurse) by Rory Ortiz RN (offgoing nurse). Report included the following information SBAR, Kardex, ED Summary, Procedure Summary, Intake/Output, MAR, Recent Results and Cardiac Rhythm NSR to sinus tach. 0800: Dialysis RN at bedside   1210: Propofol paused and RT at bedside for SBT   1248: Dr. Karma Marquez aware of pt jerking motion. Per Dr. Karma Marquez, take pt off SBT. RT notified and propofol restarted. 1418: Per phone call with MRI, pt cannot be seen until 8:00pm-12:00am due to covid positive status and need to shut down scanner for minimum 60 min.  Will notify night shift RN   1650: RT at bedside changing filter

## 2022-01-11 NOTE — PROGRESS NOTES
Physical Therapy  1/11/2022    Chart reviewed. PT held today due to need for increased propofol after SBT. F/u tomorrow for evaluation. Thank you.     Pushpa Ortiz, PT, DPT

## 2022-01-11 NOTE — PROGRESS NOTES
Occupational Therapy  01/11/22    Chart reviewed, patient currently on bedside HD, will follow up later today as able & appropriate for OT evaluation.      Thank you,   Valeri Wei, OTD, OTR/L

## 2022-01-11 NOTE — PROGRESS NOTES
[facility]    ICU TEAM Progress Note    Name: Akil Luciano   : 1955   MRN: [id number]   Date: 2022           ICU Assessment     1. COVID  2. Vertebral stenosis not explaining exam change; COVID encephalopathy v NCSE             ICU Comprehensive Plan of Care:     1. Neurological System  EEG  ASA and Plavix    2. Cardiovascular System  BP improved  Pt has EF of 20-25 percent but cannot add beta blockade and ACE at this time due to low BP    3. Respiratory System  Attempt wean after EEG and HD. Hopeful for better exam today. No MRI    4. Renal/GI/Endocrine System  HD patient. HD in progress. 5. Infectious Disease  COVID-19  May consider Steroids but reason for intubation was mental status and COVID was incidental.  Having no issues ventilating pt. 6. PT/OT: Not at this time    7. Goals of Care Discussion with family:  Sister would like pt to be full code    8. Plan of Care/Code Status: Full    9. Discussed Care Plan with Bedside RN    10. Documentation of Current Medications    Subjective:   Progress Note: 2022      Reason for ICU Admission: Pt fell out of bed at nursing home    HPI:Reason for ICU Admission: Acute hypoxic respiratory failure in the setting of acute encephalopathy     Overnight Events: Presented to the emergency room for evaluation having fallen out of bed at the nursing home overnight.     HPI: The patient is a 60-year-old male with a past medical history of diabetes, depression, stroke with left-sided deficit and dysarthria, hypertension, hyperlipidemia, CAD, end-stage renal disease, and BKA who presented to the emergency department having fallen out of bed. He continued to have altered mental status and was stroke alerted upon arrival to the emergency department. He was seen and evaluated by neurology and deemed not a candidate for tPA.   CTA showed occluded cervical right vertebral artery with mild distal reconstitution in the distal occlusion at the V4 segment. Patient was given Plavix. Unfortunately patient had ongoing altered mental status and was therefore intubated by emergency medicine for airway protection.     Patient was subsequently found to be COVID-positive. He will now be admitted to the ICU for further work-up and management. Overnight Events:   1/11/2022  Await EEG, as he did not breathe over vent yesterday  May be due to COVID encephalopathy    POD:  * No surgery found *        Active Problem List:     Problem List  Never Reviewed          Codes Class    Coronary artery disease involving native coronary artery of native heart without angina pectoris (Chronic) ICD-10-CM: I25.10  ICD-9-CM: 414.01     Overview Signed 11/18/2021 12:29 PM by Renate Kussmaul, MD     Cath from [facility]  6/20/17 for abnormal stress test  LM ok  LAD  prox with benny from RCA and distal LAD occluded  D1 prox 70  D2 prox 90  Circ pMLI  OM1 50%   RCA large, mid 40             NSTEMI (non-ST elevated myocardial infarction) ([facility] [address] ICD-10-CM: I21.4  ICD-9-CM: 410.70     Overview Signed 11/18/2021 12:30 PM by Renate Kussmaul, MD     Peak troponin 2136 11/17/21  Hx of LAD  in 2017  EF 11/17/21 25%             Systolic CHF, acute on chronic [facility]) ICD-10-CM: I50.23  ICD-9-CM: 428.23, 428.0     Overview Signed 11/18/2021 12:31 PM by Renate Kussmaul, MD     11/16/21    ECHO ADULT COMPLETE 11/18/2021 11/18/2021    Interpretation Summary  · LV: Estimated LVEF is 20 - 25%. Normal wall thickness. Mildly dilated left ventricle. Moderate-to-severely and segmentally reduced systolic function. Severe hypokinesis of the basal anterolateral, mid anterolateral and apical lateral wall(s). Dyskinesis of the basal inferoseptal and mid anteroseptal wall(s). · AV: Severe aortic valve focal thickening present. Moderate aortic valve sclerosis with no evidence of reduced excursion. Aortic valve leaflet calcification present. · MV: Mitral valve thickening.  Mitral valve leaflet calcification without reduced excursion. Mild to moderate mitral valve regurgitation is present. · TV: Mild tricuspid valve regurgitation is present. · LA: Mildly dilated left atrium. Signed by: Brooke Durán MD on 11/18/2021 12:08 AM                Respiratory failure (HCC) ICD-10-CM: J96.90  ICD-9-CM: 518.81         Anemia in chronic kidney disease ICD-10-CM: N18.9, D63.1  ICD-9-CM: 285.21         Diabetes mellitus due to underlying condition with diabetic nephropathy ([facility] [address] ICD-10-CM: E08.21  ICD-9-CM: 249.40, 583.81         End stage renal disease ([facility] [address] ICD-10-CM: N18.6  ICD-9-CM: 585.6               Past Medical History:      has a past medical history of Anemia, Coronary artery disease involving native coronary artery of native heart without angina pectoris (11/18/2021), Depression, Diabetes ([facility] [address] Dysphagia, GERD (gastroesophageal reflux disease), Hyperlipidemia, Hypertension, Nausea & vomiting, and Stroke ([facility] [address] (5/16/2003). Past Surgical History:      has a past surgical history that includes hx orthopaedic (7/20/2010); hx orthopaedic; hx amputation foot (Bilateral); upper gi endoscopy,biopsy (11/29/2021); and upper gi endoscopy,diagnosis (11/29/2021). Home Medications:     Prior to Admission medications    Medication Sig Start Date End Date Taking? Authorizing Provider   hydrALAZINE (APRESOLINE) 25 mg tablet Take 25 mg by mouth four (4) days a week. Receives 25mg in morning and 25mg in evening on Mon/Wed/Fri/Sun   Yes Provider, Historical   hydrALAZINE (APRESOLINE) 25 mg tablet Take 25 mg by mouth four (4) days a week. Receives 25mg in morning and 25mg in evening on Mon/Wed/Fri/Sun   Yes Provider, Historical   carvediloL (COREG) 6.25 mg tablet Take 1 Tablet by mouth two (2) times daily (with meals). 11/21/21  Yes Sandrita Rivero MD   isosorbide dinitrate (ISORDIL) 10 mg tablet Take 1 Tablet by mouth three (3) times daily.  11/21/21  Yes Sandrita Rivero MD   sevelamer carbonate (Renvela) 0.8 gram pwpk oral powder Take 0.8 g by mouth three (3) times daily (with meals). 5/19/21  Yes Other, MD Libertad   atorvastatin (LIPITOR) 40 mg tablet Take 40 mg by mouth nightly. 11/1/21  Yes Other, MD Libertad   Combigan 0.2-0.5 % drop ophthalmic solution Administer 1 Drop to left eye every twelve (12) hours. Glaucoma 11/1/21  Yes Other, MD Libertad   chlorproMAZINE (THORAZINE) 50 mg tablet 50 mg three (3) times daily. 11/8/21  Yes Other, MD Libertad   clopidogreL (PLAVIX) 75 mg tab Take 75 mg by mouth daily. 11/7/21  Yes Other, MD Libertad   gabapentin (NEURONTIN) 100 mg capsule 100 mg nightly. 10/26/21  Yes Other, MD Libertad   insulin lispro (HUMALOG) 100 unit/mL injection by SubCUTAneous route Before breakfast, lunch, dinner and at bedtime. Inject per sliding scale, 0-200= 0; 201-250= 2 units; 251-300= 4 units; 301-350= 6 units; 351-400= 8 units; 401-999= 10 units. Greater than or equal to 401, give 10 units and CALL MD/NP, subcutaneously before meals and at bedtime for DM. 11/15/21  Yes Other, MD Libertad   latanoprost (XALATAN) 0.005 % ophthalmic solution Administer 1 Drop to left eye nightly. Unspecified Glaucoma 10/26/21  Yes Other, MD Libertad   omeprazole (PRILOSEC) 40 mg capsule Take 40 mg by mouth daily. 11/1/21  Yes Other, MD Libertad   sertraline (ZOLOFT) 50 mg tablet Take 50 mg by mouth daily. 11/1/21  Yes Other, MD Libertad   tamsulosin (FLOMAX) 0.4 mg capsule Take 0.4 mg by mouth nightly. 11/1/21  Yes Other, MD Libertad   Doxepin 3 mg tab Take 3 mg by mouth nightly. Yes Other, MD Libertad   multivitamin (ONE A DAY) tablet Take 1 Tablet by mouth daily. Yes Other, MD Libertad   senna (Senna) 8.6 mg tablet Take 1 Tablet by mouth nightly. Yes Other, MD Libertad   sodium chloride (OCEAN) 0.65 % nasal squeeze bottle 1 [place] by Both Nostrils route three (3) times daily as needed for Congestion. Yes Other, MD Libertad   acetaminophen (TylenoL) 325 mg tablet Take 650 mg by mouth every four (4) hours as needed for Pain.    Yes Other, MD Libertad   ergocalciferol (Vitamin D2) 1,250 mcg (50,000 unit) capsule Take 50,000 Units by mouth every seven (7) days. EVERY Monday. Yes Shukri, MD Libertad   zinc sulfate (ZINCATE) 50 mg zinc (220 mg) capsule Take 1 Capsule by mouth daily. Yes Other, MD Libertad   aspirin 81 mg tablet Take 81 mg by mouth daily. Yes Shukri, MD Libertad   Glutose-15 40 % oral gel  10/21/21   Other, MD Libertad   ascorbic acid, vitamin C, (Vitamin C) 500 mg tablet Take 500 mg by mouth two (2) times a day. Other, MD Libertad       Allergies/Social/Family History: Allergies   Allergen Reactions    Adhesive Tape-Silicones Rash    Trazodone Unknown (comments)     Listed in transfer paper 22      Social History     Tobacco Use    Smoking status: Never Smoker    Smokeless tobacco: Not on file   Substance Use Topics    Alcohol use: No      No family history on file. Review of Systems:     Pt intubated and sedated. Unable to give ROS    Objective:   Vital Signs:  Visit Vitals  /80   Pulse 86   Temp 98.1 °F (36.7 °C)   Resp 16   Ht 6' 3\" (1.905 m)   Wt 71.3 kg (157 lb 3 oz)   SpO2 100%   BMI 19.65 kg/m²      O2 Device: Endotracheal tube,Ventilator Temp (24hrs), Av.6 °F (37 °C), Min:98.1 °F (36.7 °C), Max:99.3 °F (37.4 °C)           Intake/Output:     Intake/Output Summary (Last 24 hours) at 2022 0858  Last data filed at 2022 0700  Gross per 24 hour   Intake 1336.28 ml   Output    Net 1336.28 ml       Physical Exam:    Elderly male in bed in NAD. Has strong cough reflex.   CV RRR  Pulm No rales appreciated  GI Abd soft  Skin warm, dry    LABS AND  DATA: Personally reviewed  Recent Labs     22  0508 01/10/22  0429   WBC 7.9 7.0   HGB 10.7* 10.5*   HCT 33.1* 32.3*    194     Recent Labs     22  0508 01/10/22  0429    139   K 3.2* 3.2*    101   CO2 25 29   BUN 39* 26*   CREA 8.45* 6.84*   * 64*   CA 9.4 9.2   MG 2.5* 2.3   PHOS 2.4* 1.7*     Recent Labs     22  0203   AP 69   TP 7.5   ALB 3.0* GLOB 4.5*     Recent Labs     01/09/22  0203   INR 1.1   PTP 11.3*   APTT 31.0      No results for input(s): PHI, PCO2I, PO2I, FIO2I in the last 72 hours. No results for input(s): CPK, CKMB, TROIQ, BNPP in the last 72 hours. Ventilator Settings:  Mode Rate Tidal Volume Pressure FiO2 PEEP   Assist control  16 600 ml    25 % 5 cm H20     Peak airway pressure: 32 cm H2O    Minute ventilation: 12 l/min        MEDS: Reviewed    Chest X-Ray:  CXR Results  (Last 48 hours)    None            Multidisciplinary Rounds Completed:  No    ABCDEF Bundle/Checklist Completed:  Yes        CRITICAL CARE CONSULTANT NOTE  I had a face to face encounter with the patient, reviewed and interpreted patient data including clinical events, labs, images, vital signs, I/O's, and examined patient. I have discussed the case and the plan and management of the patient's care with the consulting services, the bedside nurses and the respiratory therapist.      NOTE OF PERSONAL INVOLVEMENT IN CARE   This patient has a high probability of imminent, clinically significant deterioration, which requires the highest level of preparedness to intervene urgently. I participated in the decision-making and personally managed or directed the management of the following life and organ supporting interventions that required my frequent assessment to treat or prevent imminent deterioration. I personally spent 45 minutes of critical care time. This is time spent at this critically ill patient's bedside actively involved in patient care as well as the coordination of care. This does not include any procedural time which has been billed separately.     Ping Yeboah MD  Staff Intensivist/Neurointensivist  [facility]  1/11/2022

## 2022-01-11 NOTE — PROCEDURES
Hemodialysis / [phone]    Vitals Pre Post Assessment Pre Post   BP BP: 131/89 (01/11/22 0745) 106/74 LOC Intubated and sedated Intubated and sedated   HR Pulse (Heart Rate): 89 (01/11/22 0745) 85 Lungs Diminished bilat Diminished bilat   Resp Resp Rate: 20 (01/11/22 0725) 20 Cardiac WNL WNL   Temp Temp: 99.3 °F (37.4 °C) (01/11/22 0725) 97.8 Skin Dry and intact Dry and intact   Weight n/a n/a Edema none none   Tele status bedside bedside Pain Pain Intensity 1: 0 (01/11/22 0400) 0     Orders   Duration: Start: 0725 End: 1026 Total: 3 hours   Dialyzer: Dialyzer/Set Up Inspection: Revaclear (01/11/22 0725)   K Bath: Dialysate K (mEq/L): 3 (01/11/22 0725)   Ca Bath: Dialysate CA (mEq/L): 2.5 (01/11/22 0725)   Na: Dialysate NA (mEq/L): 138 (01/11/22 0725)   Bicarb: Dialysate HCO3 (mEq/L): 35 (01/11/22 0725)   Target Fluid Removal: Goal/Amount of Fluid to Remove (mL): 2000 mL (01/11/22 0725)     Access   Type & Location: MARLENE AVF: skin CDI. No s/s of infection. + B/T. No issues with cannulation or hemostasis. Running well at .       Comments:                                        Labs   HBsAg (Antigen) / date:                     negative   1/8/22                       HBsAb (Antibody) / date: Susceptible   1/8/22   Source: [facility] care   Obtained/Reviewed  Critical Results Called HGB   Date Value Ref Range Status   01/11/2022 10.7 (L) 12.1 - 17.0 g/dL Final     Potassium   Date Value Ref Range Status   01/11/2022 3.2 (L) 3.5 - 5.1 mmol/L Final     Calcium   Date Value Ref Range Status   01/11/2022 9.4 8.5 - 10.1 MG/DL Final     BUN   Date Value Ref Range Status   01/11/2022 39 (H) 6 - 20 MG/DL Final     Creatinine   Date Value Ref Range Status   01/11/2022 8.45 (H) 0.70 - 1.30 MG/DL Final        Meds Given   Name Dose Route   none                 Adequacy / Fluid    Total Liters Process: 65.7   Net Fluid Removed: 3342      Comments   Time Out Done:   (Time) 0965   Admitting Diagnosis: covid   Consent obtained/signed: Informed Consent Verified: Yes (01/11/22 0725)   Machine / RO # Machine Number: [id number] (01/11/22 0725)   Primary Nurse Rpt Pre: Gloria Zaidi RN   Primary Nurse Rpt Post: Gloria Zaidi, RN   Pt Education: Hemodialysis   Care Plan: To continue HD   Pts outpatient clinic: [facility]     Tx Summary   Comments:                          Pt intubated and sedated. Consent signed & on file. SBAR received from Primary RN.  3104: Pt cannulated with 41N needles per policy & without issue. Labs drawn per request/ order. VSS.    0715: contacted Dr. Sandra Fermin about patient's current order for a 3K bath when patient's K level is 3.2, no response from Dr. Sandra Fermin. 0725: Dialysis Tx initiated  0900: Pt resting quietly. 0935: BP 96/75, decreased temp to 35.5 and decreased UF goal 500  1000: re-verified patient's dialysis order. Order has been changed to a 4K bath. Changed potassium bath per order. 1015: BP 75/51, turned off UF, Gave 200ml bolus, MD made aware, rechecked BP  1026: Tx ended. VSS. All possible blood returned to patient. Hemostasis achieved without issue. Bed locked and in the lowest position, call bell and belongings in reach. SBAR given to Primary, RN. Patient is stable at time of their/ my departure. All Dialysis related medications have been reviewed.

## 2022-01-12 NOTE — PROGRESS NOTES
Transition of Care Plan   RUR: 23%   Disposition: The disposition plan is return to [facility] in [address] [facility] [address] F/U with PCP/Specialist     Transport: [facility]           Patient remains in ICU vented on isolation for Covid virus, on Diprivan for sedation. Patient in [facility] at [facility], referral made through [facility]. Will need BLS transport at discharge. CM updated SW with [facility].     No Fritz, CRM

## 2022-01-12 NOTE — PROGRESS NOTES
[facility]    ICU TEAM Progress Note    Name: Michael Grady   : 1955   MRN: [id number]   Date: 2022           ICU Assessment     1. COVID 19  2. Acute hypoxic respiratory failure  3. Atelectasis  4. Vertebral stenosis not explaining exam change  5. Acute toxic encephalopathy  a. Differential includes COVID-19 or subclinical seizures  6. End-stage renal disease             ICU Comprehensive Plan of Care:     1. Neurological System  EEG  ASA and Plavix  Appreciate neurology    2. Cardiovascular System  BP improved  Pt has EF of 20-25 percent but cannot add beta blockade and ACE at this time due to low BP    3. Respiratory System  Wean FiO2 for pulse ox goal greater than 88%  SAT  SBT    4. Renal/GI/Endocrine System  Appreciate nephrology  HD as needed    5. Infectious Disease  COVID-19    6. PT/OT: Not at this time    7. Goals of Care Discussion with family:  Sister would like pt to be full code    8. Plan of Care/Code Status: Full    9. Discussed Care Plan with Bedside RN    10. Documentation of Current Medications    Subjective:   Progress Note: 2022      Reason for ICU Admission: Pt fell out of bed at nursing home    HPI:Reason for ICU Admission: Acute hypoxic respiratory failure in the setting of acute encephalopathy     Overnight Events: Presented to the emergency room for evaluation having fallen out of bed at the nursing home overnight.     HPI: The patient is a 57-year-old male with a past medical history of diabetes, depression, stroke with left-sided deficit and dysarthria, hypertension, hyperlipidemia, CAD, end-stage renal disease, and BKA who presented to the emergency department having fallen out of bed. He continued to have altered mental status and was stroke alerted upon arrival to the emergency department. He was seen and evaluated by neurology and deemed not a candidate for tPA.   CTA showed occluded cervical right vertebral artery with mild distal reconstitution in the distal occlusion at the V4 segment. Patient was given Plavix. Unfortunately patient had ongoing altered mental status and was therefore intubated by emergency medicine for airway protection.     Patient was subsequently found to be COVID-positive. He will now be admitted to the ICU for further work-up and management. Overnight Events:   1/11 - Await EEG, as he did not breathe over vent yesterday  May be due to [place] encephalopathy  1/12 -MRI done overnight, no occlusion, history of vertebral artery occlusion    POD:  * No surgery found *    Active Problem List:     Problem List  Never Reviewed          Codes Class    Coronary artery disease involving native coronary artery of native heart without angina pectoris (Chronic) ICD-10-CM: I25.10  ICD-9-CM: 414.01     Overview Signed 11/18/2021 12:29 PM by Donny Newton MD     Cath from [address] [place] [address]  6/20/17 for abnormal stress test  LM ok  LAD  prox with benny from RCA and distal LAD occluded  D1 prox 70  D2 prox 90  Circ pMLI  OM1 50%   RCA large, mid 40             NSTEMI (non-ST elevated myocardial infarction) ([facility] ICD-10-CM: I21.4  ICD-9-CM: 410.70     Overview Signed 11/18/2021 12:30 PM by Donny Newton MD     Peak troponin 2136 11/17/21  Hx of LAD  in 2017  EF 11/17/21 25%             Systolic CHF, acute on chronic [facility]) ICD-10-CM: I50.23  ICD-9-CM: 428.23, 428.0     Overview Signed 11/18/2021 12:31 PM by Donny Newton MD     11/16/21    ECHO ADULT COMPLETE 11/18/2021 11/18/2021    Interpretation Summary  · LV: Estimated LVEF is 20 - 25%. Normal wall thickness. Mildly dilated left ventricle. Moderate-to-severely and segmentally reduced systolic function. Severe hypokinesis of the basal anterolateral, mid anterolateral and apical lateral wall(s). Dyskinesis of the basal inferoseptal and mid anteroseptal wall(s). · AV: Severe aortic valve focal thickening present. Moderate aortic valve sclerosis with no evidence of reduced excursion.  Aortic valve leaflet calcification present. · MV: Mitral valve thickening. Mitral valve leaflet calcification without reduced excursion. Mild to moderate mitral valve regurgitation is present. · TV: Mild tricuspid valve regurgitation is present. · LA: Mildly dilated left atrium. Signed by: Nola Lesches, MD on 11/18/2021 12:08 AM                Respiratory failure (HCC) ICD-10-CM: J96.90  ICD-9-CM: 518.81         Anemia in chronic kidney disease ICD-10-CM: N18.9, D63.1  ICD-9-CM: 285.21         Diabetes mellitus due to underlying condition with diabetic nephropathy ([facility] ICD-10-CM: E08.21  ICD-9-CM: 249.40, 583.81         End stage renal disease ([facility] ICD-10-CM: N18.6  ICD-9-CM: 585.6               Past Medical History:      has a past medical history of Anemia, Coronary artery disease involving native coronary artery of native heart without angina pectoris (11/18/2021), Depression, Diabetes ([facility] [address] Dysphagia, GERD (gastroesophageal reflux disease), Hyperlipidemia, Hypertension, Nausea & vomiting, and Stroke ([facility] [address] (5/16/2003). Past Surgical History:      has a past surgical history that includes hx orthopaedic (7/20/2010); hx orthopaedic; hx amputation foot (Bilateral); upper gi endoscopy,biopsy (11/29/2021); and upper gi endoscopy,diagnosis (11/29/2021). Home Medications:     Prior to Admission medications    Medication Sig Start Date End Date Taking? Authorizing Provider   hydrALAZINE (APRESOLINE) 25 mg tablet Take 25 mg by mouth four (4) days a week. Receives 25mg in morning and 25mg in evening on Mon/Wed/Fri/Sun   Yes Provider, Historical   hydrALAZINE (APRESOLINE) 25 mg tablet Take 25 mg by mouth four (4) days a week. Receives 25mg in morning and 25mg in evening on Mon/Wed/Fri/Sun   Yes Provider, Historical   carvediloL (COREG) 6.25 mg tablet Take 1 Tablet by mouth two (2) times daily (with meals).  11/21/21  Yes Sima Johnson MD   isosorbide dinitrate (ISORDIL) 10 mg tablet Take 1 Tablet by mouth three (3) times daily. 11/21/21  Yes Hansa Wahl MD   sevelamer carbonate (Renvela) 0.8 gram pwpk oral powder Take 0.8 g by mouth three (3) times daily (with meals). 5/19/21  Yes Libertad Watt MD   atorvastatin (LIPITOR) 40 mg tablet Take 40 mg by mouth nightly. 11/1/21  Yes Shukri, MD Libertad   Combigan 0.2-0.5 % drop ophthalmic solution Administer 1 Drop to left eye every twelve (12) hours. Glaucoma 11/1/21  Yes Shukri, MD Libertad   chlorproMAZINE (THORAZINE) 50 mg tablet 50 mg three (3) times daily. 11/8/21  Yes Libertad Watt MD   clopidogreL (PLAVIX) 75 mg tab Take 75 mg by mouth daily. 11/7/21  Yes Shukri, MD Libertad   gabapentin (NEURONTIN) 100 mg capsule 100 mg nightly. 10/26/21  Yes Libertad Watt MD   insulin lispro (HUMALOG) 100 unit/mL injection by SubCUTAneous route Before breakfast, lunch, dinner and at bedtime. Inject per sliding scale, 0-200= 0; 201-250= 2 units; 251-300= 4 units; 301-350= 6 units; 351-400= 8 units; 401-999= 10 units. Greater than or equal to 401, give 10 units and CALL MD/NP, subcutaneously before meals and at bedtime for DM. 11/15/21  Yes Shukri, MD Libertad   latanoprost (XALATAN) 0.005 % ophthalmic solution Administer 1 Drop to left eye nightly. Unspecified Glaucoma 10/26/21  Yes Shukri, MD Libertad   omeprazole (PRILOSEC) 40 mg capsule Take 40 mg by mouth daily. 11/1/21  Yes Other, MD Libertad   sertraline (ZOLOFT) 50 mg tablet Take 50 mg by mouth daily. 11/1/21  Yes Other, MD Libertad   tamsulosin (FLOMAX) 0.4 mg capsule Take 0.4 mg by mouth nightly. 11/1/21  Yes Other, MD Libertad   Doxepin 3 mg tab Take 3 mg by mouth nightly. Yes Other, MD Libertad   multivitamin (ONE A DAY) tablet Take 1 Tablet by mouth daily. Yes Other, MD Libertad   senna (Senna) 8.6 mg tablet Take 1 Tablet by mouth nightly. Yes Other, MD Libertad   sodium chloride (OCEAN) 0.65 % nasal squeeze bottle 1 [place] by Both Nostrils route three (3) times daily as needed for Congestion.    Yes Other, MD Libertad   acetaminophen (TylenoL) 325 mg tablet Take 650 mg by mouth every four (4) hours as needed for Pain. Yes Shukri, MD Libertad   ergocalciferol (Vitamin D2) 1,250 mcg (50,000 unit) capsule Take 50,000 Units by mouth every seven (7) days. EVERY Monday. Yes Shukri, MD Libertad   zinc sulfate (ZINCATE) 50 mg zinc (220 mg) capsule Take 1 Capsule by mouth daily. Yes Shukri, MD Libertad   aspirin 81 mg tablet Take 81 mg by mouth daily. Yes Shukri, MD Libertad   Glutose-15 40 % oral gel  10/21/21   Other, MD Libertad   ascorbic acid, vitamin C, (Vitamin C) 500 mg tablet Take 500 mg by mouth two (2) times a day. Other, MD Libertad       Allergies/Social/Family History: Allergies   Allergen Reactions    Adhesive Tape-Silicones Rash    Trazodone Unknown (comments)     Listed in transfer paper 22      Social History     Tobacco Use    Smoking status: Never Smoker    Smokeless tobacco: Not on file   Substance Use Topics    Alcohol use: No      No family history on file. Review of Systems:     Pt intubated and sedated. Unable to give ROS    Objective:   Vital Signs:  Visit Vitals  BP (!) 139/97   Pulse 100   Temp 98.8 °F (37.1 °C)   Resp 11   Ht 6' 3\" (1.905 m)   Wt 69.6 kg (153 lb 7 oz)   SpO2 100%   BMI 19.18 kg/m²      O2 Device: Endotracheal tube,Ventilator Temp (24hrs), Av.5 °F (36.9 °C), Min:97.8 °F (36.6 °C), Max:99.3 °F (37.4 °C)           Intake/Output:     Intake/Output Summary (Last 24 hours) at 2022 0634  Last data filed at 2022 0600  Gross per 24 hour   Intake 1778.4 ml   Output 1261 ml   Net 517.4 ml       Physical Exam:    Elderly male in bed in NAD. Has strong cough reflex.   CV RRR  Pulm No rales appreciated  GI Abd soft  Skin warm, dry    LABS AND  DATA: Personally reviewed  Recent Labs     22  0508   WBC 14.0* 7.9   HGB 10.8* 10.7*   HCT 33.6* 33.1*    215     Recent Labs     22/22  0508    137   K 3.4* 3.2*   CL 99 100   CO2 27 25   BUN 29* 39*   CREA 6.66* 8.45*   * 233*   CA 10.0 9.4   MG 2.5* 2.5*   PHOS 2.8 2.4*     No results for input(s): AP, TBIL, TP, ALB, GLOB, AML, LPSE in the last 72 hours. No lab exists for component: SGOT, GPT, AMYP  No results for input(s): INR, PTP, APTT, INREXT, INREXT in the last 72 hours. No results for input(s): PHI, PCO2I, PO2I, FIO2I in the last 72 hours. No results for input(s): CPK, CKMB, TROIQ, BNPP in the last 72 hours. Ventilator Settings:  Mode Rate Tidal Volume Pressure FiO2 PEEP   Assist control,Volume control  16 600 ml  5 cm H2O 25 % 5 cm H20     Peak airway pressure: 25 cm H2O (post suction)    Minute ventilation: 16.3 l/min        MEDS: Reviewed    Chest X-Ray:  CXR Results  (Last 48 hours)    None        Multidisciplinary Rounds Completed:  No    ABCDEF Bundle/Checklist Completed:  Yes    CRITICAL CARE CONSULTANT NOTE  I had a face to face encounter with the patient, reviewed and interpreted patient data including clinical events, labs, images, vital signs, I/O's, and examined patient. I have discussed the case and the plan and management of the patient's care with the consulting services, the bedside nurses and the respiratory therapist.      NOTE OF PERSONAL INVOLVEMENT IN CARE   This patient has a high probability of imminent, clinically significant deterioration, which requires the highest level of preparedness to intervene urgently. I participated in the decision-making and personally managed or directed the management of the following life and organ supporting interventions that required my frequent assessment to treat or prevent imminent deterioration. I personally spent 55 minutes of critical care time. This is time spent at this critically ill patient's bedside actively involved in patient care as well as the coordination of care. This does not include any procedural time which has been billed separately.     Lorin Atkins DO   Staff Intensivist/Anesthesiologist  Critical Care Medicine

## 2022-01-12 NOTE — PROGRESS NOTES
: Bedside and Verbal shift change report given to DEO Almanza (oncoming nurse) by Venkata Borrego RN (offgoing nurse). Report included the following information SBAR, Kardex, Intake/Output, MAR, Recent Results, Cardiac Rhythm NSR/ST and Alarm Parameters . : Spoke with Cyndi Camarena NP about patients strong cough and concern for being still enough for MRI. Orders received to give versed 2 mg IVP prior to MRI.    : Off unit to MRI with RT, RN, and PCT. : Returned to unit from MRI. MARIANA Westbrook notified of having to remove the patients SBP in the upper 80's to lower 90's with a MAP > 65 while at MRI. Orders received to give a 500 mL bolus of lactated ringers.  yayo was removed at MRI due to the weight in the bottom. Cyndi Camarena NP notified. RN will place new  yayo and order a KUB for placement verification. 0000: NP notified of patients slight neurological changes (see Chart). Will continue to monitor. 730: Bedside and Verbal shift change report given to Kemar Collins RN (oncoming nurse) by Clint Mishra RN (offgoing nurse). Report included the following information SBAR, Kardex, Intake/Output, MAR, Recent Results, Cardiac Rhythm NSR/ST and Alarm Parameters .

## 2022-01-12 NOTE — PROGRESS NOTES
Physical Therapy  1/12/2022    Chart reviewed, discussed with RN. Patient remains inappropriate for therapy services at this time, increased ventilation & sedation with no functional command following. Will sign off, please re-consult when patient is able to functionally participate.     Pushpa Oviedo, PT, DPT

## 2022-01-12 NOTE — PROGRESS NOTES
Physician Progress Note      Karen Blair  [facility] #:                  [id number]  :                       1955  ADMIT DATE:       2022 2:52 AM  DISCH DATE:  RESPONDING  PROVIDER #:        KAYLIN SWANN DO          QUERY TEXT:    Patient admitted with eAMS Noted documentation of intubation for airway protection and acute hypoxic respiratory failure . No increased work of breathing documented . Please indicate one of the following and document in the medical record: The medical record reflects the following:  Risk Factors: AMS  Clinical Indicators:  ED  Pulmonary:  Effort: Pulmonary effort is normal. No respiratory distress. Breath sounds: Normal breath sounds. No stridor    H/P  Reason for ICU Admission: Acute hypoxic respiratory failure in the setting of acute encephalopathy    Unfortunately patient had ongoing altered mental status and was therefore intubated by emergency medicine for airway protection. Treatment: vent, ICU monitoring      Thank you,  Lenny Matute RN, [facility], [place], [address]  Certified Clinical   [phone]  Options provided:  -- Intubated for Acute Hypoxic Respiratory Failure as evidenced by, Please document evidence. -- Intubated for airway protection only, Acute Respiratory Failure ruled out after study  -- Other - I will add my own diagnosis  -- Disagree - Not applicable / Not valid  -- Disagree - Clinically unable to determine / Unknown  -- Refer to Clinical Documentation Reviewer    PROVIDER RESPONSE TEXT:    This patient was intubated for airway protection only, Acute Respiratory Failure has been ruled out after study. Query created by: Raad Kinney on 1/10/2022 11:46 AM      QUERY TEXT:    Patient admitted with AMS . Pt noted to have diabetes requiring sliding scale insulin. Please document in progress notes and discharge summary further specificity regarding the control status of DM:       The medical record reflects the following:  Risk Factors: DM  Clinical Indicators:  1/8/2022 05:44  GLUCOSE,FAST - POC: 169 (H)    1/8/2022 17:04  GLUCOSE,FAST - POC: 161 (H)    1/8/2022 03:15  Glucose: 187 (H)    1/8/2022 18:28  Glucose: 160 (H)    1/8/2022 03:15  Hemoglobin A1c, (calculated): 6.2 (H)  Est. average glucose: 131    Treatment: POC glucose,  lantus insulin , lispro sliding scale insulin    Thank you,  Vivi Tse RN, CDI, Jaciel, CCDS  Certified Clinical   [phone]  Options provided:  -- Hyperglycemia due to DM type II  -- Hyperglycemia not related to DM type II  -- Other - I will add my own diagnosis  -- Disagree - Not applicable / Not valid  -- Disagree - Clinically unable to determine / Unknown  -- Refer to Clinical Documentation Reviewer    PROVIDER RESPONSE TEXT:    This patient has Hyperglycemia due to DM type II.     Query created by: Mark Funez on 1/10/2022 11:49 AM      Electronically signed by:  Tami Chao DO 1/12/2022 2:34 PM

## 2022-01-12 NOTE — PROGRESS NOTES
Occupational Therapy  01/12/22    Chart reviewed, discussed with RN. Patient remains inappropriate for therapy services at this time, increased ventilation & sedation with no functional command following. Will sign off, please re-consult when patient is able to functionally participate.      Thank you,   Rose Marie Tucker, OTKAY, OTR/L

## 2022-01-12 NOTE — PROGRESS NOTES
0730: Bedside and Verbal shift change report given to GUADALUPE Montes (oncoming nurse) by Chon Mcnamara RN (offgoing nurse). Report included the following information SBAR, Kardex, Intake/Output, MAR, Recent Results, Med Rec Status, Cardiac Rhythm NSR and Alarm Parameters . Drips verified at the start of the shift:   -Propofol @ 15 mcg/kg/hr     1930: Bedside and Verbal shift change report given to Hansel Trivedi RN (oncoming nurse) by GUADALUPE Cordon RN (offgoing nurse). Report included the following information SBAR, Kardex, Intake/Output, MAR, Recent Results, Med Rec Status, Cardiac Rhythm NSR and Alarm Parameters .

## 2022-01-12 NOTE — WOUND CARE
WOCN Note:     New wound care consult placed for buttock wound  Assessed in room 7112    Chart shows:  Patient admitted on 1/8/22 with Respiratory failure, Covid 19  Past Medical History:   Diagnosis Date    Anemia     Coronary artery disease involving native coronary artery of native heart without angina pectoris 11/18/2021    Cath from [facility] 6/20/17 for abnormal stress test LM ok LAD  prox with benny from RCA and distal LAD occluded D1 prox 70 D2 prox 90 Circ pMLI OM1 50%  RCA large, mid 40    Depression     Diabetes ([facility]     Dysphagia     GERD (gastroesophageal reflux disease)     Hyperlipidemia     Hypertension     Nausea & vomiting     Stroke ([facility] 5/16/2003      Assessment:   Intubated and sedated  Mobility: total assistance with repositioning  Continence: incontinent of stool, anuric  Last Laureano Score: 12  Surface: [place] in-touch mattress    Bilateral BKA        1. MASD to buttocks with partial thickness skin breakdown to right buttock measuring 1.5 x 0.6 cm. No exudate    Wound Recommendations:      Cleanse buttocks with Remedy foaming cleanser, pat dry, Apply Desitin Cream  to buttocks and sacrum every 8 hours and as needed with incontinence care    PI Prevention:  Turn/reposition approximately every 2 hours  Pad bony prominences   Keep HOB 30 degrees or less to decrease shearing and pressure unless medically contraindicated.  If HOB is to be over 30 degrees, raise knees first then [facility] to prevent sliding   Minimize layers of linen/pads under patient to optimize support surface to one flat sheet and one incontinence pad     Orders received from Dr. Pauly Rdz  Discussed with RN     Transition of Care: Plan to follow weekly and as needed while admitted to hospital    Susie Campos MSN, RN, [address] [facility],[address], [facility]  Certified Wound and Ostomy Nurse  office [phone]  Can be reached through [address] [place] [address]  pager [phone] or call  to page

## 2022-01-13 NOTE — PROGRESS NOTES
Name: Ju Pagan   MRN: [id number]  : 1955        Assessment:                                    Plan:  ESRD-T//S--FMC/MCV  Intubated  Low K/Phos  COVID (+)  CVA  (B) amputee  DM  Occluded (R) vertebral artery  CMO ef 20-25%  Anemia Had HD earlier today. Got 1.5 Kg UF  Check Phos tomm     Not on phosphate binders    Ct WARREN       Subjective:  Seen thru glass door in ICU  Chart revd  D/W RN  Had HD earlier today.  Not on pressors    ROS:   Deferred    Exam:  Visit Vitals  BP (!) 88/58   Pulse 92   Temp 99 °F (37.2 °C)   Resp 14   Ht 6' 3\" (1.905 m)   Wt 70 kg (154 lb 5.2 oz)   SpO2 98%   BMI 19.29 kg/m²     Chronic ill appearing in mild resp distress  +ETT    Current Facility-Administered Medications   Medication Dose Route Frequency Last Admin    albuterol (PROVENTIL VENTOLIN) nebulizer solution 2.5 mg  2.5 mg Nebulization Q4H PRN      zinc oxide-cod liver oil (DESITIN) 40 % paste   Topical Q8H Given at 22 1312    insulin glargine (LANTUS) injection 10 Units  10 Units SubCUTAneous DAILY 10 Units at 22 0936    chlorhexidine (ORAL CARE KIT) 0.12 % mouthwash 15 mL  15 mL Oral Q12H 15 mL at 22 0936    heparin (porcine) injection 5,000 Units  5,000 Units SubCUTAneous Q8H 5,000 Units at 22 0629    midazolam (VERSED) injection 2 mg  2 mg IntraVENous Q30MIN PRN 2 mg at 22 2046    dexmedeTOMidine in 0.9 % NaCl (PRECEDEX) 400 mcg/100 mL (4 mcg/mL) infusion soln  0.1-1.5 mcg/kg/hr IntraVENous TITRATE 0.9 mcg/kg/hr at 22 1313    atorvastatin (LIPITOR) tablet 40 mg  40 mg Oral DAILY 40 mg at 22 0936    propofol (DIPRIVAN) 10 mg/mL infusion  0-50 mcg/kg/min (Order-Specific) IntraVENous TITRATE Paused at 22 1230    sodium chloride (NS) flush 5-40 mL  5-40 mL IntraVENous Q8H 10 mL at 22 1312    sodium chloride (NS) flush 5-40 mL  5-40 mL IntraVENous PRN      acetaminophen (TYLENOL) tablet 650 mg  650 mg Oral Q6H PRN      Or    acetaminophen (TYLENOL) suppository 650 mg  650 mg Rectal Q6H PRN      polyethylene glycol (MIRALAX) packet 17 g  17 g Oral DAILY PRN      ondansetron (ZOFRAN ODT) tablet 4 mg  4 mg Oral Q8H PRN      Or    ondansetron (ZOFRAN) injection 4 mg  4 mg IntraVENous Q6H PRN      glucose chewable tablet 16 g  4 Tablet Oral PRN      dextrose (D50W) injection syrg 12.5-25 g  25-50 mL IntraVENous PRN 25 g at 01/10/22 0911    glucagon (GLUCAGEN) injection 1 mg  1 mg IntraMUSCular PRN      insulin lispro (HUMALOG) injection   SubCUTAneous Q6H 2 Units at 01/13/22 0031    lansoprazole (PREVACID) 3 mg/mL oral suspension 30 mg  30 mg Per NG tube ACB 30 mg at 01/13/22 0936    albumin human 25% (BUMINATE) solution 25 g  25 g IntraVENous PRN 25 g at 01/13/22 0600    [Held by provider] epoetin glenny-epbx (RETACRIT) injection 4,000 Units  4,000 Units SubCUTAneous Q TUE, THU & SAT 4,000 Units at 01/08/22 2121    aspirin tablet 325 mg  325 mg Oral DAILY 325 mg at 01/13/22 0936    clopidogreL (PLAVIX) tablet 75 mg  75 mg Oral DAILY 75 mg at 01/13/22 0936       Labs/Data:    Lab Results   Component Value Date/Time    WBC 10.5 01/13/2022 02:29 AM    HGB 11.2 (L) 01/13/2022 02:29 AM    HCT 36.1 (L) 01/13/2022 02:29 AM    PLATELET 301 25/95/1960 02:29 AM    MCV 95.0 01/13/2022 02:29 AM       Lab Results   Component Value Date/Time    Sodium 136 01/13/2022 02:29 AM    Potassium 3.2 (L) 01/13/2022 02:29 AM    Chloride 100 01/13/2022 02:29 AM    CO2 29 01/13/2022 02:29 AM    Anion gap 7 01/13/2022 02:29 AM    Glucose 212 (H) 01/13/2022 02:29 AM    BUN 44 (H) 01/13/2022 02:29 AM    Creatinine 8.01 (H) 01/13/2022 02:29 AM    BUN/Creatinine ratio 5 (L) 01/13/2022 02:29 AM    GFR est AA 8 (L) 01/13/2022 02:29 AM    GFR est non-AA 7 (L) 01/13/2022 02:29 AM    Calcium 9.9 01/13/2022 02:29 AM       Wt Readings from Last 3 Encounters:   01/13/22 70 kg (154 lb 5.2 oz)   12/02/21 80 kg (176 lb 5.9 oz)   11/21/21 72.8 kg (160 lb 7.9 oz)         Intake/Output Summary (Last 24 hours) at 1/13/2022 1413  Last data filed at 1/13/2022 1200  Gross per 24 hour   Intake 1054.16 ml   Output 1500 ml   Net -445.84 ml       Patient seen and examined. Chart reviewed. Labs, data and other pertinent notes reviewed in last 24 hrs.     PMH/SH/FH reviewed and unchanged compared to H&P    Rajwinder Huerta MD

## 2022-01-13 NOTE — PROGRESS NOTES
[facility]    ICU TEAM Progress Note    Name: Za Grayson   : 1955   MRN: [id number]   Date: 2022           ICU Assessment     1. COVID 19  2. Acute hypoxic respiratory failure  3. Atelectasis  4. Vertebral stenosis not explaining exam change  5. Acute toxic encephalopathy  a. Differential includes COVID-19 or subclinical seizures  6. End-stage renal disease             ICU Comprehensive Plan of Care:     1. Neurological System  EEG  ASA and Plavix  Appreciate neurology    2. Cardiovascular System  BP improved  Pt has EF of 20-25 percent but cannot add beta blockade and ACE at this time due to low BP    3. Respiratory System  Wean FiO2 for pulse ox goal greater than 88%  SAT  SBT    4. Renal/GI/Endocrine System  Appreciate nephrology  HD as needed    5. Infectious Disease  COVID-19    6. PT/OT: Not at this time    7. Goals of Care Discussion with family:  Sister would like pt to be full code    8. Plan of Care/Code Status: Full    9. Discussed Care Plan with Bedside RN    10. Documentation of Current Medications    Subjective:   Progress Note: 2022      Reason for ICU Admission: Pt fell out of bed at nursing home    HPI:Reason for ICU Admission: Acute hypoxic respiratory failure in the setting of acute encephalopathy     Overnight Events: Presented to the emergency room for evaluation having fallen out of bed at the nursing home overnight.     HPI: The patient is a 58-year-old male with a past medical history of diabetes, depression, stroke with left-sided deficit and dysarthria, hypertension, hyperlipidemia, CAD, end-stage renal disease, and BKA who presented to the emergency department having fallen out of bed. He continued to have altered mental status and was stroke alerted upon arrival to the emergency department. He was seen and evaluated by neurology and deemed not a candidate for tPA.   CTA showed occluded cervical right vertebral artery with mild distal reconstitution in the distal occlusion at the V4 segment. Patient was given Plavix. Unfortunately patient had ongoing altered mental status and was therefore intubated by emergency medicine for airway protection.     Patient was subsequently found to be COVID-positive. He will now be admitted to the ICU for further work-up and management. Overnight Events:   1/11 - Await EEG, as he did not breathe over vent yesterday  May be due to [place] encephalopathy  1/12 -MRI done overnight, no occlusion, history of vertebral artery occlusion  1/13: No acute events overnight    POD:  * No surgery found *    Active Problem List:     Problem List  Never Reviewed          Codes Class    Coronary artery disease involving native coronary artery of native heart without angina pectoris (Chronic) ICD-10-CM: I25.10  ICD-9-CM: 414.01     Overview Signed 11/18/2021 12:29 PM by Nichol Wilkins MD     Cath from [facility]  6/20/17 for abnormal stress test  LM ok  LAD  prox with benny from RCA and distal LAD occluded  D1 prox 70  D2 prox 90  Circ pMLI  OM1 50%   RCA large, mid 40             NSTEMI (non-ST elevated myocardial infarction) ([facility] [address] ICD-10-CM: I21.4  ICD-9-CM: 410.70     Overview Signed 11/18/2021 12:30 PM by Nichol Wilkins MD     Peak troponin 2136 11/17/21  Hx of LAD  in 2017  EF 11/17/21 25%             Systolic CHF, acute on chronic [facility]) ICD-10-CM: I50.23  ICD-9-CM: 428.23, 428.0     Overview Signed 11/18/2021 12:31 PM by Nichol Wilkins MD     11/16/21    ECHO ADULT COMPLETE 11/18/2021 11/18/2021    Interpretation Summary  · LV: Estimated LVEF is 20 - 25%. Normal wall thickness. Mildly dilated left ventricle. Moderate-to-severely and segmentally reduced systolic function. Severe hypokinesis of the basal anterolateral, mid anterolateral and apical lateral wall(s). Dyskinesis of the basal inferoseptal and mid anteroseptal wall(s). · AV: Severe aortic valve focal thickening present.  Moderate aortic valve sclerosis with no evidence of reduced excursion. Aortic valve leaflet calcification present. · MV: Mitral valve thickening. Mitral valve leaflet calcification without reduced excursion. Mild to moderate mitral valve regurgitation is present. · TV: Mild tricuspid valve regurgitation is present. · LA: Mildly dilated left atrium. Signed by: Ru Moura MD on 11/18/2021 12:08 AM                Respiratory failure (HCC) ICD-10-CM: J96.90  ICD-9-CM: 518.81         Anemia in chronic kidney disease ICD-10-CM: N18.9, D63.1  ICD-9-CM: 285.21         Diabetes mellitus due to underlying condition with diabetic nephropathy ([facility] [address] ICD-10-CM: E08.21  ICD-9-CM: 249.40, 583.81         End stage renal disease ([facility] [address] ICD-10-CM: N18.6  ICD-9-CM: 585.6               Past Medical History:      has a past medical history of Anemia, Coronary artery disease involving native coronary artery of native heart without angina pectoris (11/18/2021), Depression, Diabetes ([facility] [address] Dysphagia, GERD (gastroesophageal reflux disease), Hyperlipidemia, Hypertension, Nausea & vomiting, and Stroke ([facility] (5/16/2003). Past Surgical History:      has a past surgical history that includes hx orthopaedic (7/20/2010); hx orthopaedic; hx amputation foot (Bilateral); upper gi endoscopy,biopsy (11/29/2021); and upper gi endoscopy,diagnosis (11/29/2021). Home Medications:     Prior to Admission medications    Medication Sig Start Date End Date Taking? Authorizing Provider   hydrALAZINE (APRESOLINE) 25 mg tablet Take 25 mg by mouth four (4) days a week. Receives 25mg in morning and 25mg in evening on Mon/Wed/Fri/Sun   Yes Provider, Historical   hydrALAZINE (APRESOLINE) 25 mg tablet Take 25 mg by mouth four (4) days a week. Receives 25mg in morning and 25mg in evening on Mon/Wed/Fri/Sun   Yes Provider, Historical   carvediloL (COREG) 6.25 mg tablet Take 1 Tablet by mouth two (2) times daily (with meals).  11/21/21  Yes Sky Boo MD   isosorbide dinitrate (ISORDIL) 10 mg tablet Take 1 Tablet by mouth three (3) times daily. 11/21/21  Yes Rogelio Walton MD   sevelamer carbonate (Renvela) 0.8 gram pwpk oral powder Take 0.8 g by mouth three (3) times daily (with meals). 5/19/21  Yes Shukri, MD [facility]   atorvastatin (LIPITOR) 40 mg tablet Take 40 mg by mouth nightly. 11/1/21  Yes Other, MD [facility]   Combigan 0.2-0.5 % drop ophthalmic solution Administer 1 Drop to left eye every twelve (12) hours. Glaucoma 11/1/21  Yes Other, MD [facility]   chlorproMAZINE (THORAZINE) 50 mg tablet 50 mg three (3) times daily. 11/8/21  Yes Libertad Watt MD   clopidogreL (PLAVIX) 75 mg tab Take 75 mg by mouth daily. 11/7/21  Yes Other, MD [facility]   gabapentin (NEURONTIN) 100 mg capsule 100 mg nightly. 10/26/21  Yes Other, MD Libertad   insulin lispro (HUMALOG) 100 unit/mL injection by SubCUTAneous route Before breakfast, lunch, dinner and at bedtime. Inject per sliding scale, 0-200= 0; 201-250= 2 units; 251-300= 4 units; 301-350= 6 units; 351-400= 8 units; 401-999= 10 units. Greater than or equal to 401, give 10 units and CALL MD/NP, subcutaneously before meals and at bedtime for DM. 11/15/21  Yes Shukri, MD [facility]   latanoprost (XALATAN) 0.005 % ophthalmic solution Administer 1 Drop to left eye nightly. Unspecified Glaucoma 10/26/21  Yes Other, MD [facility]   omeprazole (PRILOSEC) 40 mg capsule Take 40 mg by mouth daily. 11/1/21  Yes Other, MD Libertad   sertraline (ZOLOFT) 50 mg tablet Take 50 mg by mouth daily. 11/1/21  Yes Other, MD Libertad   tamsulosin (FLOMAX) 0.4 mg capsule Take 0.4 mg by mouth nightly. 11/1/21  Yes Other, MD Libertad   Doxepin 3 mg tab Take 3 mg by mouth nightly. Yes Other, MD [facility]   multivitamin (ONE A DAY) tablet Take 1 Tablet by mouth daily. Yes Other, MD Libertad   senna (Senna) 8.6 mg tablet Take 1 Tablet by mouth nightly. Yes Other, MD Libertad   sodium chloride (OCEAN) 0.65 % nasal squeeze bottle 1 [place] by Both Nostrils route three (3) times daily as needed for Congestion.    Yes Other, MD Libertad acetaminophen (TylenoL) 325 mg tablet Take 650 mg by mouth every four (4) hours as needed for Pain. Yes Shukri, MD [facility]   ergocalciferol (Vitamin D2) 1,250 mcg (50,000 unit) capsule Take 50,000 Units by mouth every seven (7) days. EVERY Monday. Yes Shukri, MD [facility]   zinc sulfate (ZINCATE) 50 mg zinc (220 mg) capsule Take 1 Capsule by mouth daily. Yes Shukri, MD [facility]   aspirin 81 mg tablet Take 81 mg by mouth daily. Yes Shukri, MD [facility]   Glutose-15 40 % oral gel  10/21/21   Other, MD Libertad   ascorbic acid, vitamin C, (Vitamin C) 500 mg tablet Take 500 mg by mouth two (2) times a day. Other, MD Libertad       Allergies/Social/Family History: Allergies   Allergen Reactions    Adhesive Tape-Silicones Rash    Trazodone Unknown (comments)     Listed in transfer paper 22      Social History     Tobacco Use    Smoking status: Never Smoker    Smokeless tobacco: Not on file   Substance Use Topics    Alcohol use: No      No family history on file. Review of Systems:     Pt intubated and sedated. Unable to give ROS    Objective:   Vital Signs:  Visit Vitals  BP 98/68 Comment: Albumin started. Will continue to monitor    Pulse 92   Temp 99.2 °F (37.3 °C)   Resp 15   Ht 6' 3\" (1.905 m)   Wt 70 kg (154 lb 5.2 oz)   SpO2 100%   BMI 19.29 kg/m²      O2 Device: Ventilator,Endotracheal tube Temp (24hrs), Av.7 °F (37.1 °C), Min:97.9 °F (36.6 °C), Max:99.5 °F (37.5 °C)           Intake/Output:     Intake/Output Summary (Last 24 hours) at 2022 0618  Last data filed at 2022 0000  Gross per 24 hour   Intake 1145.96 ml   Output    Net 1145.96 ml       Physical Exam:    Elderly male in bed in NAD. Has strong cough reflex.   CV RRR  Pulm No rales appreciated  GI Abd soft  Skin warm, dry    LABS AND  DATA: Personally reviewed  Recent Labs     22  0229 22  0423   WBC 10.5 14.0*   HGB 11.2* 10.8*   HCT 36.1* 33.6*    209     Recent Labs     22  0229 22  0423 01/11/22  0508 01/11/22  0508    137   < > 137   K 3.2* 3.4*   < > 3.2*    99   < > 100   CO2 29 27   < > 25   BUN 44* 29*   < > 39*   CREA 8.01* 6.66*   < > 8.45*   * 193*   < > 233*   CA 9.9 10.0   < > 9.4   MG  --  2.5*  --  2.5*   PHOS  --  2.8  --  2.4*    < > = values in this interval not displayed. No results for input(s): AP, TBIL, TP, ALB, GLOB, AML, LPSE in the last 72 hours. No lab exists for component: SGOT, GPT, AMYP  No results for input(s): INR, PTP, APTT, INREXT, INREXT in the last 72 hours. No results for input(s): PHI, PCO2I, PO2I, FIO2I in the last 72 hours. No results for input(s): CPK, CKMB, TROIQ, BNPP in the last 72 hours. Ventilator Settings:  Mode Rate Tidal Volume Pressure FiO2 PEEP   Assist control,Volume control  16 600 ml  5 cm H2O 25 % 5 cm H20     Peak airway pressure: 18 cm H2O    Minute ventilation: 8.99 l/min        MEDS: Reviewed    Chest X-Ray:  CXR Results  (Last 48 hours)    None        Multidisciplinary Rounds Completed:  No    ABCDEF Bundle/Checklist Completed:  Yes    CRITICAL CARE CONSULTANT NOTE  I had a face to face encounter with the patient, reviewed and interpreted patient data including clinical events, labs, images, vital signs, I/O's, and examined patient. I have discussed the case and the plan and management of the patient's care with the consulting services, the bedside nurses and the respiratory therapist.      NOTE OF PERSONAL INVOLVEMENT IN CARE   This patient has a high probability of imminent, clinically significant deterioration, which requires the highest level of preparedness to intervene urgently. I participated in the decision-making and personally managed or directed the management of the following life and organ supporting interventions that required my frequent assessment to treat or prevent imminent deterioration. I personally spent 75 minutes of critical care time.   This is time spent at this critically ill patient's bedside actively involved in patient care as well as the coordination of care. This does not include any procedural time which has been billed separately.     Anil Velasquez DO   Staff Intensivist/Anesthesiologist  Critical Care Medicine

## 2022-01-13 NOTE — PROGRESS NOTES
01/13/22 1245   Weaning Parameters   Spontaneous Breathing Trial Complete No (Comments)  (apnea)   Resp Rate Observed 26   Ve 17.8      RSBI 41       Patient apneic. Placed back on previous vent settings at this time. RN aware.

## 2022-01-13 NOTE — PROCEDURES
Hemodialysis / [phone]    Vitals Pre Post Assessment Pre Post   BP BP: 105/74 (01/13/22 0500) 104/70 LOC Sedated and  Intubated  Sedated and Intubated    HR Pulse (Heart Rate): 98 (01/13/22 0500) 96 Lungs Intubated  Intubated    Resp Resp Rate: 14 (01/13/22 0500) 27 Cardiac Normal Sinus Rhythm  Normal Sinus Rhythm    Temp Temp: 99.2 °F (37.3 °C) (01/13/22 0500) 99.0 Skin Warm and dry  Warm and dry    Weight  68.7kg    N/A Edema Generalized  Generalized edema     Tele status Monitored at bedside  Monitored at Bedside  Pain Pain Intensity 1: 0 (01/13/22 0000) Unable to assess r/t patient sedated      Orders   Duration: Start: 0503 End: 0906 Total: 4hrs   Dialyzer: Dialyzer/Set Up Inspection: Revaclear (01/13/22 0500)   K Bath: Dialysate K (mEq/L): 4 (01/13/22 0500)   Ca Bath: Dialysate CA (mEq/L): 2.5 (01/13/22 0500)   Na: Dialysate NA (mEq/L): 140 (01/13/22 0500)   Bicarb: Dialysate HCO3 (mEq/L): 35 (01/13/22 0500)   Target Fluid Removal: Goal/Amount of Fluid to Remove (mL): 2500 mL (01/13/22 0500)     Access   Type & Location: Right AVF   Comments:                                   No s/s of infection noted      Labs   HBsAg (Antigen) / date:         Negative 01/08/2022                                      HBsAb (Antibody) / date: Immune 01/08/2022   Source: [facility]    Obtained/Reviewed  Critical Results Called HGB   Date Value Ref Range Status   01/13/2022 11.2 (L) 12.1 - 17.0 g/dL Final     Potassium   Date Value Ref Range Status   01/13/2022 3.2 (L) 3.5 - 5.1 mmol/L Final     Calcium   Date Value Ref Range Status   01/13/2022 9.9 8.5 - 10.1 MG/DL Final     BUN   Date Value Ref Range Status   01/13/2022 44 (H) 6 - 20 MG/DL Final     Comment:     INVESTIGATED PER DELTA CHECK PROTOCOL     Creatinine   Date Value Ref Range Status   01/13/2022 8.01 (H) 0.70 - 1.30 MG/DL Final        Meds Given   Name Dose Route   Albumin  25g Via HD circuit                Adequacy / Fluid    Total Liters Process: 84.4L Net Fluid Removed: 1500ml      Comments   Time Out Done:   (Time) 0500   Admitting Diagnosis: ESRD    Consent obtained/signed: Informed Consent Verified: Yes (01/13/22 0500)   Machine / RO # Machine Number: [id number] (01/13/22 0500)   Primary Nurse Rpt Pre: Mortimer Calico RN   Primary Nurse Rpt Post: Raul Ryan    RN Pt sedated    Care Plan: Continue with HD care plan    Pts outpatient clinic: [address]     Tx Summary   Comments:                   PAUL AVF: skin CDI. No s/s of infection. + B/T. No issues with cannulation or hemostasis. Running well at . SBAR received from Primary RN.  1086: Pt cannulated with 69G needles per policy & without issue. VSS. Dialysis Tx initiated. 0600: Albumin PRN started r/t patient low bp. Will continue to monitor. 0615:Patient BP improving . Will continue to monitor   0730:Patient bp dropped to the 80's, UF turned off. AAlbumin still ongoing. Patient asyptomatic at this time. Will continue to monitor patient . 0800: Pt resting quietly. 6449: Tx ended. VSS. All possible blood returned to patient. Hemostasis achieved without issue. Bed locked and in the lowest position, call bell and belongings in reach. SBAR given to Primary, RN. Patient is stable at time of  my departure. All Dialysis related medications have been reviewed.

## 2022-01-13 NOTE — PROGRESS NOTES
Physician Progress Note      PATIENTVernida Felty  [facility]                  [id number]  :                       1955  ADMIT DATE:       2022 2:52 AM  DISCH DATE:  RESPONDING  PROVIDER #:        KAYLIN SWANN DO        QUERY TEXT:    Type of Encephalopathy: Please provide further specificity, if known. Clinical indicators include: infectious, acute, ncephalopathy, end-stage renal disease, altered mental status, chronic kidney disease, end stage renal disease, alcohol use  Options provided:  -- Anoxic/hypoxic encephalopathy  -- Metabolic encephalopathy  -- Toxic encephalopathy  -- Hepatic encephalopathy  -- Hypertensive encephalopathy  -- Other - I will add my own diagnosis  -- Disagree - Not applicable / Not valid  -- Disagree - Clinically Unable to determine / Unknown        PROVIDER RESPONSE TEXT:    The patient has metabolic encephalopathy.       Electronically signed by:  Jessica Gilbert DO 2022 3:22 PM

## 2022-01-13 NOTE — PROGRESS NOTES
TRANSITION OF CARE:  RUR:23%  Disposition: Patient will return to long term bed at [facility] when medically stable. Patient remains on ventilator and is on sedation. Toni Wallace has been updated on patient's status.

## 2022-01-13 NOTE — PROGRESS NOTES
0730: Bedside and Verbal shift change report given to H. Frankey Hales, RN (oncoming nurse) by Viv Walker RN (offgoing nurse). Report included the following information SBAR, Kardex, Intake/Output, MAR, Recent Results, Med Rec Status, Cardiac Rhythm NSR and Alarm Parameters . Drips verified upon the start of the shift:   -Propofol @ 20 mcg/kg/min    1230: Propofol paused. RT at bedside to complete SBT.     1255: Patient appears to be tolerating SBT at this time, however, coughing excessively and biting ETT. Precedex started. 1400: Patient going apneic on SBT. RT made aware. Will switch back to rate. 1545: Patient's MAP in the low 60s with SBP in the 80s. Orders received to give albumin. Primary Nurse Delbert Greenwood and ABEL RN performed a dual skin assessment on this patient Impairment noted- see wound doc flow sheet  Laureano score is 11    1930: Bedside and Verbal shift change report given to Viv Walker RN (oncoming nurse) by Moe Devries. Frankey Hales, RN (offgoing nurse). Report included the following information SBAR, Kardex, Intake/Output, MAR, Recent Results, Med Rec Status, Cardiac Rhythm NSR and Alarm Parameters .

## 2022-01-13 NOTE — PROGRESS NOTES
Spiritual Care Assessment/Progress Note  [facility]      NAME: Jaime Christianson      MRN: [id number]  AGE: 78 y.o. SEX: male  Scientologist Affiliation: Non Jainism   Language: English     1/13/2022     Total Time (in minutes): 10     Spiritual Assessment begun in [address] through conversation with:         [x]Patient        [] Family    [] Friend(s)        Reason for Consult: Initial/Spiritual assessment, critical care     Spiritual beliefs: (Please include comment if needed)     [] Identifies with a ollie tradition:         [] Supported by a ollie community:            [] Claims no spiritual orientation:           [] Seeking spiritual identity:                [] Adheres to an individual form of spirituality:           [x] Not able to assess:                           Identified resources for coping:      [] Prayer                               [] Music                  [] Guided Imagery     [] Family/friends                 [] Pet visits     [] Devotional reading                         [x] Unknown     [] Other:                                            Interventions offered during this visit: (See comments for more details)                Plan of Care:     [] Support spiritual and/or cultural needs    [] Support AMD and/or advance care planning process      [] Support grieving process   [] Coordinate Rites and/or Rituals    [] Coordination with community clergy   [] No spiritual needs identified at this time   [] Detailed Plan of Care below (See Comments)  [] Make referral to Music Therapy  [] Make referral to Pet Therapy     [] Make referral to Addiction services  [] Make referral to [facility]  [] Make referral to Spiritual Care Partner  [] No future visits requested        [x] Contact Spiritual Care for further referrals     Comments:  for initial visit in the ICU. Pt is unable to interact and contact precautions. No family present. Talked with pt's RN for update.  Please contact [phone] [facility] for further support.      [address] [place], [facility] [place]   [phone])

## 2022-01-14 NOTE — CONSULTS
Neurology Consult  Kirstin Loyd, MARIANA    Patient: Chandrakant New MRN: [id number]  SSN: [id number]    YOB: 1955  Age: 78 y.o. Sex: male      Chief Complaint: Continued unresponsiveness, involuntary movements    Subjective:      Chandrakant New is a 78 y.o. M with a past medical history of CVA, HTN, hyperlipidemia, GERD, dysphagia, DM, CAD, anemia, GERD, depression, bilateral BKA, and CKD on dialysis who presented to the ER at [facility] via EMS in the early am on 01/08/22 after being found on the floor at [facility] after falling out of bed at approximately 0100. He was unresponsive. He was known to be positive with COVID-19. A Code Stroke was called on his arrival to the ER. [address] [place] [address] showed no acute abnormality. CTA H/N showed extensive vertebrobasilar disease with an occluded cervical right vertebral artery with some mild distal reconstitution and distal occlusion at the V4 segment. There was a dominant left VA with severe stenosis of the V4 segment on the left side. The basilar artery was with diffuse stenosis. Corresponding perfusion abnormalities were noted in the brainstem. No LVO. He was not a candidate for tPA or for mechanical thrombectomy. Patient was intubated in ER to protect his airway. According to record he had been on aspirin 81 mg q day, Plavix 75 mg po q day and Lipitor 40 mg po q day. He was loaded with aspirin and Plavix in the ER and he has continued on aspirin, Plavix and Lipitor during his admission. There was concern for possible subclinical seizure due to his unresponsiveness, so EEG was completed on 01/10/22 which showed no seizure activity. There was a mild generalized encephalopathic process. Neurology was re-consulted due to patient remaining unresponsive and now having some possible seizure-like activity versus posturing. He was given 2 mg of Ativan by Intensivist due to left eye gaze deviation and jerking movements and rigidity.      Past Medical History:   Diagnosis Date  Anemia     Coronary artery disease involving native coronary artery of native heart without angina pectoris 2021    [facility] from [facility] for abnormal stress test LM ok LAD  prox with benny from RCA and distal LAD occluded D1 prox 70 D2 prox 90 Circ pMLI OM1 50%  RCA large, mid 40    Depression     Diabetes (HCC)     Dysphagia     GERD (gastroesophageal reflux disease)     Hyperlipidemia     Hypertension     Nausea & vomiting     Stroke ([facility] [address] 2003     No family history on file. Social History     Tobacco Use    Smoking status: Never Smoker    Smokeless tobacco: Not on file   Substance Use Topics    Alcohol use: No      Prior to Admission Medications   Prescriptions Last Dose Informant Patient Reported? Taking? Combigan 0.2-0.5 % drop ophthalmic solution   Yes Yes   Sig: Administer 1 Drop to left eye every twelve (12) hours. Glaucoma   Doxepin 3 mg tab   Yes Yes   Sig: Take 3 mg by mouth nightly. Glutose-15 40 % oral gel   Yes No   acetaminophen (TylenoL) 325 mg tablet   Yes Yes   Sig: Take 650 mg by mouth every four (4) hours as needed for Pain. ascorbic acid, vitamin C, (Vitamin C) 500 mg tablet Unknown at Unknown time  Yes No   Sig: Take 500 mg by mouth two (2) times a day. aspirin 81 mg tablet   Yes Yes   Sig: Take 81 mg by mouth daily. atorvastatin (LIPITOR) 40 mg tablet   Yes Yes   Sig: Take 40 mg by mouth nightly. carvediloL (COREG) 6.25 mg tablet   No Yes   Sig: Take 1 Tablet by mouth two (2) times daily (with meals). chlorproMAZINE (THORAZINE) 50 mg tablet   Yes Yes   Si mg three (3) times daily. clopidogreL (PLAVIX) 75 mg tab 2022 at Unknown time  Yes Yes   Sig: Take 75 mg by mouth daily. ergocalciferol (Vitamin D2) 1,250 mcg (50,000 unit) capsule   Yes Yes   Sig: Take 50,000 Units by mouth every seven (7) days. EVERY Monday. gabapentin (NEURONTIN) 100 mg capsule   Yes Yes   Si mg nightly.    hydrALAZINE (APRESOLINE) 25 mg tablet   Yes Yes Sig: Take 25 mg by mouth four (4) days a week. Receives 25mg in morning and 25mg in evening on Mon/Wed/Fri/Sun   hydrALAZINE (APRESOLINE) 25 mg tablet   Yes Yes   Sig: Take 25 mg by mouth four (4) days a week. Receives 25mg in morning and 25mg in evening on Mon/Wed/Fri/Sun   insulin lispro (HUMALOG) 100 unit/mL injection   Yes Yes   Sig: by SubCUTAneous route Before breakfast, lunch, dinner and at bedtime. Inject per sliding scale, 0-200= 0; 201-250= 2 units; 251-300= 4 units; 301-350= 6 units; 351-400= 8 units; 401-999= 10 units. Greater than or equal to 401, give 10 units and CALL MD/NP, subcutaneously before meals and at bedtime for DM.   isosorbide dinitrate (ISORDIL) 10 mg tablet   No Yes   Sig: Take 1 Tablet by mouth three (3) times daily. latanoprost (XALATAN) 0.005 % ophthalmic solution   Yes Yes   Sig: Administer 1 Drop to left eye nightly. Unspecified Glaucoma   multivitamin (ONE A DAY) tablet   Yes Yes   Sig: Take 1 Tablet by mouth daily. omeprazole (PRILOSEC) 40 mg capsule   Yes Yes   Sig: Take 40 mg by mouth daily. senna (Senna) 8.6 mg tablet   Yes Yes   Sig: Take 1 Tablet by mouth nightly. sertraline (ZOLOFT) 50 mg tablet   Yes Yes   Sig: Take 50 mg by mouth daily. sevelamer carbonate (Renvela) 0.8 gram pwpk oral powder   Yes Yes   Sig: Take 0.8 g by mouth three (3) times daily (with meals). sodium chloride (OCEAN) 0.65 % nasal squeeze bottle   Yes Yes   Si [place] by Both Nostrils route three (3) times daily as needed for Congestion. tamsulosin (FLOMAX) 0.4 mg capsule   Yes Yes   Sig: Take 0.4 mg by mouth nightly. zinc sulfate (ZINCATE) 50 mg zinc (220 mg) capsule   Yes Yes   Sig: Take 1 Capsule by mouth daily. Facility-Administered Medications: None       Allergies   Allergen Reactions    Adhesive Tape-Silicones Rash    Trazodone Unknown (comments)     Listed in transfer paper 22       Review of Systems:  Review of systems not obtained due to patient factors. Unresponsiveness. Objective:     Vitals:    01/13/22 1900 01/13/22 2000 01/13/22 2100 01/13/22 2119   BP: (!) 112/50 107/73 (!) 105/59    Pulse: 75 73 83 77   Resp: 14 14 30 14   Temp:  97.6 °F (36.4 °C)     TempSrc:       SpO2: 100% 100% 100% 100%   Weight:       Height:          Physical Exam:  GENERAL: Ill appearing male, intubated and sedated with Precedex. SKIN: Warm, dry, color appropriate for ethnicity. EXTREMITIES: Bilateral lower extremity BKAs. Neurologic Exam:  Mental Status:  No eye opening, no command following. Language:    M[place], ETT in place. Cranial Nerves:   Left pupil 2 mm round reactive to light, right pupil approximately 3 mm irregularly shaped and appears fixed. Blink to threat in the left eye, no blink to threat on the right. Right eye esotropia with gaze inward and down. (Most likely chronic blindness as CTH shows right eye phthisis bulbi). Left eye slightly lateral gaze. No facial asymmetry noted around ETT. Motor:    Left arm and hand contracted and rigid. Occasional spontaneous involuntary movements noted throughout. Sensation:    Extensor posturing to pain in bilateral upper extremities. Reflexes:    Positive corneal reflex in left eye, none in right eye.        Labs:  Lab Results   Component Value Date/Time    WBC 10.5 01/13/2022 02:29 AM    HGB 11.2 (L) 01/13/2022 02:29 AM    HCT 36.1 (L) 01/13/2022 02:29 AM    PLATELET 979 69/73/0624 02:29 AM    MCV 95.0 01/13/2022 02:29 AM      Lab Results   Component Value Date/Time    Sodium 136 01/13/2022 02:29 AM    Potassium 3.2 (L) 01/13/2022 02:29 AM    Chloride 100 01/13/2022 02:29 AM    CO2 29 01/13/2022 02:29 AM    Anion gap 7 01/13/2022 02:29 AM    Glucose 212 (H) 01/13/2022 02:29 AM    BUN 44 (H) 01/13/2022 02:29 AM    Creatinine 8.01 (H) 01/13/2022 02:29 AM    BUN/Creatinine ratio 5 (L) 01/13/2022 02:29 AM    GFR est AA 8 (L) 01/13/2022 02:29 AM    GFR est non-AA 7 (L) 01/13/2022 02:29 AM    Calcium 9.9 01/13/2022 02:29 AM     No results found for: CPK, RCK1, RCK2, RCK3, RCK4, CKMB, CKNDX, CKND1, TROPT, TROIQ, BNPP, BNP    Imaging:  CT Results (maximum last 3): Results from [place] encounter on 01/08/22    CT HEAD WO CONT    Narrative  INDICATION: AMS    EXAM:  HEAD CT WITHOUT CONTRAST    COMPARISON: January 8, 2022    TECHNIQUE:  Routine noncontrast axial head CT was performed. Sagittal and  coronal reconstructions were generated. CT dose reduction was achieved through use of a standardized protocol tailored  for this examination and automatic exposure control for dose modulation. FINDINGS:    Ventricles: Midline, no hydrocephalus. Intracranial Hemorrhage: None. Brain Parenchyma/Brainstem: Chronic right MCA territory infarction. Chronic  right inferior cerebellar infarction. Generalized atrophy. Basal Cisterns: Normal.  Paranasal Sinuses: Visualized sinuses are clear. Additional Comments: Intravascular contrast. Right phthisis bulbi. Impression  No acute process. CTA CODE NEURO HEAD AND NECK W CONT    Addendum 1/8/2022  4:04 AM  Addendum: 69% stenosis at the origin of the right internal carotid artery    Narrative  EXAM: CTA CODE NEURO HEAD AND NECK W CONT, CT CODE NEURO PERF W CBF    INDICATION: unresponsive and s/p fall    COMPARISON: None. CONTRAST: 100 mL of Isovue-370. TECHNIQUE:  Unenhanced  images were obtained to localize the volume for  acquisition. Multislice helical axial CT angiography was performed from the  aortic arch to the top of the head during uneventful rapid bolus intravenous  contrast administration. Coronal and sagittal reformations and 3D post  processing was performed. CT dose reduction was achieved through use of a  standardized protocol tailored for this examination and automatic exposure  control for dose modulation. This study was analyzed by the [address] ai algorithm.     CT brain perfusion was performed with generation of hemodynamic maps of multiple  parameters, including cerebral blood flow, cerebral blood volume, and MTT (mean  transit time). CT dose reduction was achieved through use of a standardized  protocol tailored for this examination and automatic exposure control for dose  modulation. FINDINGS:    CTA NECK  There is conventional three vessel arch anatomy. Calcified plaque and stenosis  is noted at the origin of the right internal carotid artery. % of right carotid artery stenosis: 69%  % of left carotid artery stenosis: No significant stenosis    NASCET method was utilized for calculating stenosis. The proximal right vertebral artery is very small in caliber and then not  visualized in the mid neck. There is some mild reconstitution distally. The  cervical portion of the left vertebral artery is patent. Spiculated scarring in  the bilateral lung apices. Small calcified right globe. There are degenerative  changes of the cervical spine. CTA HEAD  Left jugular is dominant. The distal right vertebral artery is occluded. There  is severe stenosis in the distal left vertebral artery. . There is diffuse  multifocal stenosis in the basilar artery which is already small in caliber. Fetal origin the bilateral posterior cerebral arteries. . Moderate  atherosclerotic disease is present in the cavernous portion of the bilateral  internal carotid arteries. . . There is no aneurysm. Chronic small vessel  ischemic disease. CT PERFUSION:    There is a regional area of a elevated Tmax involving the brainstem. rCBF < 30% = 0 cc. Tmax > 6 seconds = 20 cc. Impression  1. Extensive vertebrobasilar disease. Occluded cervical right vertebral artery  with some mild distal reconstitution and then distal occlusion at the V4  segment. There is a dominant left vertebral artery with severe stenosis of the  V4 segment of the left vertebral artery. The basilar artery is small caliber  with diffuse stenosis.     2.  Corresponding perfusion abnormalities noted in the brainstem. 3.  Chronic small vessel ischemic disease. .    4.  Findings were discussed with Dr. Cortez Hightower at the time of dictation      CT CODE NEURO PERF W CBF    Addendum 1/8/2022  4:04 AM  Addendum: 69% stenosis at the origin of the right internal carotid artery    Narrative  EXAM: CTA CODE NEURO HEAD AND NECK W CONT, CT CODE NEURO PERF W CBF    INDICATION: unresponsive and s/p fall    COMPARISON: None. CONTRAST: 100 mL of Isovue-370. TECHNIQUE:  Unenhanced  images were obtained to localize the volume for  acquisition. Multislice helical axial CT angiography was performed from the  aortic arch to the top of the head during uneventful rapid bolus intravenous  contrast administration. Coronal and sagittal reformations and 3D post  processing was performed. CT dose reduction was achieved through use of a  standardized protocol tailored for this examination and automatic exposure  control for dose modulation. This study was analyzed by the [address] ai algorithm. CT brain perfusion was performed with generation of hemodynamic maps of multiple  parameters, including cerebral blood flow, cerebral blood volume, and MTT (mean  transit time). CT dose reduction was achieved through use of a standardized  protocol tailored for this examination and automatic exposure control for dose  modulation. FINDINGS:    CTA NECK  There is conventional three vessel arch anatomy. Calcified plaque and stenosis  is noted at the origin of the right internal carotid artery. % of right carotid artery stenosis: 69%  % of left carotid artery stenosis: No significant stenosis    NASCET method was utilized for calculating stenosis. The proximal right vertebral artery is very small in caliber and then not  visualized in the mid neck. There is some mild reconstitution distally. The  cervical portion of the left vertebral artery is patent. Spiculated scarring in  the bilateral lung apices. Small calcified right globe. There are degenerative  changes of the cervical spine. CTA HEAD  Left jugular is dominant. The distal right vertebral artery is occluded. There  is severe stenosis in the distal left vertebral artery. . There is diffuse  multifocal stenosis in the basilar artery which is already small in caliber. Fetal origin the bilateral posterior cerebral arteries. . Moderate  atherosclerotic disease is present in the cavernous portion of the bilateral  internal carotid arteries. . . There is no aneurysm. Chronic small vessel  ischemic disease. CT PERFUSION:    There is a regional area of a elevated Tmax involving the brainstem. rCBF < 30% = 0 cc. Tmax > 6 seconds = 20 cc. Impression  1. Extensive vertebrobasilar disease. Occluded cervical right vertebral artery  with some mild distal reconstitution and then distal occlusion at the V4  segment. There is a dominant left vertebral artery with severe stenosis of the  V4 segment of the left vertebral artery. The basilar artery is small caliber  with diffuse stenosis. 2.  Corresponding perfusion abnormalities noted in the brainstem. 3.  Chronic small vessel ischemic disease. .    4.  Findings were discussed with Dr. Hamida Agosto at the time of dictation    Assessment:     Hospital Problems  Never Reviewed          Codes Class Noted POA    Respiratory failure [facility]) ICD-10-CM: J96.90  ICD-9-CM: 518.81  11/16/2021 Unknown            Plan:   Continued unresponsiveness with involuntary movements and gaze deviation. Some improvement noted in involuntary movement and gaze deviation after IV Ativan given. Concern for seizure versus posturing in critically ill, encephalopathic patient with COVID-19, respiratory failure, heart failure, and CKD. -Patient with extensive vertebrobasilar disease on CTA H/N on aspirin, Plavix and Lipitor.   -No acute stroke noted on MRI completed 01/11/22.  There was atrophy, extensive chronic white matter disease, and chronic infarction in the right corona radiata, deep right frontal lobe and right cerebellum. There was associated Wallerian degeneration and probable pontine chronic infarction. Small chronic lacunar infarctions in the inferior left cerebellum. Several chronic micro-hemorrhages in the left temporal lobe and left frontal lobe. No acute hemorrhage. Right phthisis bulbi. - EEG on 01/10/22 showed no clearly lateralizing or epileptiform features. There was a mild generalized encephalopathic process. - ECHO with EF of 15-20%. Severe global hypokinesis. -Frequent neuro checks per unit protocol  -Recheck CTH and EEG as he has new involuntary movements  -Supportive care by Intensivist and Nephrologist      I have discussed the diagnosis and the intended plan with Intensivist. Further recommendations to follow from Dr. Harleen Castillo. Thank you for this consult and participating in the care of this patient.   Signed By: Nivia Howard NP     January 14, 2022

## 2022-01-14 NOTE — PROGRESS NOTES
0730 Bedside and Verbal shift change report given to Nichole Primrose, RN (oncoming nurse) by Inna Wilde RN (offgoing nurse). Report included the following information SBAR, Kardex, ED Summary, Intake/Output, MAR, Accordion, Recent Results, Med Rec Status, Cardiac Rhythm Sinus rhythm, Alarm Parameters  and Dual Neuro Assessment. 1930 Bedside and Verbal shift change report given to DEO Mireles (oncoming nurse) by Nichole Primrose, RN (offgoing nurse). Report included the following information SBAR, Kardex, ED Summary, Intake/Output, MAR, Accordion, Recent Results, Med Rec Status, Cardiac Rhythm Sinus rhythm, Alarm Parameters  and Dual Neuro Assessment.

## 2022-01-14 NOTE — PROGRESS NOTES
Nursing Shift Note [id number]    2200: Patient became disconnected from circuit d/t pressure popping off tubing 2 separate times, both times patient off ventilator >10 minutes, oxygen saturation remained at 100%, no change in HR, patient appeared to be breathing comfortably/non-labored. 0000: Patient with large amount of violent coughing, large amounts of thick yellow secretions. Changed HME filter, deep suctioned patient thoroughly. Patient's breathing appears labored, has exaggerated jerking movements with every ventilator supplied breath. Notified NP and RT to evaluate patient. 0130: Neuro NP consult placed. 0200: NP ordered 2mg ativan, little change in neuro assessment, L pupil slightly more midline, hiccupping/jerking with ventilator respirations resolved, patient appears much more comfortable on vent. 0600: RT remains unavailable to transport to CT. Discussed with provider, per NP, non-emergent, ok to wait until therapist available later this AM.     0800: Bedside and Verbal shift change report given to Carmen Gay RN (oncoming nurse) by Thomas Arriola RN (offgoing nurse). Report included the following information SBAR, Kardex, Intake/Output, MAR, Accordion, Recent Results and Cardiac Rhythm NSR. Patient is unable to communicate at this time. No family seen t time of this visit.  offered prayer and left Spiritual Care brochure. Chaplains will continue to follow and will provide pastoral care on an as needed/requested basis.     Regina Barbosa   Spiritual Care   [phone]

## 2022-01-14 NOTE — PROGRESS NOTES
Name: Oliver Peng   MRN: [id number]  : 1955        Assessment:                                    Plan:  ESRD-T//S--FMC/MCV  Intubated  Low K/Phos  COVID (+)  CVA  (B) amputee  DM  Occluded (R) vertebral artery  CMO ef 20-25%  Anemia Next HD tomm, 4K  UF of 1-2 Kg as benito    Add on Phos to am labs  Not on phosphate binders    Ct [facility]       Subjective:  Seen thru glass door in ICU  Chart revd  D/W RN  No events overnight  EEG results noted    ROS:   Deferred    Exam:  Visit Vitals  /69   Pulse 71   Temp 98.1 °F (36.7 °C)   Resp 20   Ht 6' 3\" (1.905 m)   Wt 68 kg (149 lb 14.6 oz)   SpO2 100%   BMI 18.74 kg/m²     Chronic ill appearing in mild resp distress  +ETT  +NGT  No visible resp distress    Current Facility-Administered Medications   Medication Dose Route Frequency Last Admin    albuterol (PROVENTIL VENTOLIN) nebulizer solution 2.5 mg  2.5 mg Nebulization Q4H PRN      zinc oxide-cod liver oil (DESITIN) 40 % paste   Topical Q8H Given at 22 0649    insulin glargine (LANTUS) injection 10 Units  10 Units SubCUTAneous DAILY 10 Units at 22 0928    chlorhexidine (ORAL CARE KIT) 0.12 % mouthwash 15 mL  15 mL Oral Q12H 15 mL at 22 0930    heparin (porcine) injection 5,000 Units  5,000 Units SubCUTAneous Q8H 5,000 Units at 22 0659    midazolam (VERSED) injection 2 mg  2 mg IntraVENous Q30MIN PRN 2 mg at 22 2046    dexmedeTOMidine in 0.9 % NaCl (PRECEDEX) 400 mcg/100 mL (4 mcg/mL) infusion soln  0.1-1.5 mcg/kg/hr IntraVENous TITRATE 0.5 mcg/kg/hr at 22 1115    atorvastatin (LIPITOR) tablet 40 mg  40 mg Oral DAILY 40 mg at 22 1117    sodium chloride (NS) flush 5-40 mL  5-40 mL IntraVENous Q8H 10 mL at 22 0649    sodium chloride (NS) flush 5-40 mL  5-40 mL IntraVENous PRN      acetaminophen (TYLENOL) tablet 650 mg  650 mg Oral Q6H PRN      Or    acetaminophen (TYLENOL) suppository 650 mg  650 mg Rectal Q6H PRN      polyethylene glycol (MIRALAX) packet 17 g  17 g Oral DAILY PRN      ondansetron (ZOFRAN ODT) tablet 4 mg  4 mg Oral Q8H PRN      Or    ondansetron (ZOFRAN) injection 4 mg  4 mg IntraVENous Q6H PRN      glucose chewable tablet 16 g  4 Tablet Oral PRN      dextrose (D50W) injection syrg 12.5-25 g  25-50 mL IntraVENous PRN 25 g at 01/10/22 0911    glucagon (GLUCAGEN) injection 1 mg  1 mg IntraMUSCular PRN      insulin lispro (HUMALOG) injection   SubCUTAneous Q6H 3 Units at 01/13/22 2324    lansoprazole (PREVACID) 3 mg/mL oral suspension 30 mg  30 mg Per NG tube ACB 30 mg at 01/14/22 1117    albumin human 25% (BUMINATE) solution 25 g  25 g IntraVENous PRN 25 g at 01/13/22 0600    [Held by provider] epoetin glenny-epbx (RETACRIT) injection 4,000 Units  4,000 Units SubCUTAneous Q TUE, THU & SAT 4,000 Units at 01/08/22 2121    aspirin tablet 325 mg  325 mg Oral DAILY 325 mg at 01/14/22 1116    clopidogreL (PLAVIX) tablet 75 mg  75 mg Oral DAILY 75 mg at 01/14/22 1116       Labs/Data:    Lab Results   Component Value Date/Time    WBC 10.1 01/14/2022 05:37 AM    HGB 9.6 (L) 01/14/2022 05:37 AM    HCT 30.6 (L) 01/14/2022 05:37 AM    PLATELET 537 22/58/5441 05:37 AM    MCV 94.4 01/14/2022 05:37 AM       Lab Results   Component Value Date/Time    Sodium 137 01/14/2022 05:37 AM    Potassium 3.8 01/14/2022 05:37 AM    Chloride 101 01/14/2022 05:37 AM    CO2 28 01/14/2022 05:37 AM    Anion gap 8 01/14/2022 05:37 AM    Glucose 188 (H) 01/14/2022 05:37 AM    BUN 37 (H) 01/14/2022 05:37 AM    Creatinine 5.82 (H) 01/14/2022 05:37 AM    BUN/Creatinine ratio 6 (L) 01/14/2022 05:37 AM    GFR est AA 12 (L) 01/14/2022 05:37 AM    GFR est non-AA 10 (L) 01/14/2022 05:37 AM    Calcium 10.0 01/14/2022 05:37 AM       Wt Readings from Last 3 Encounters:   01/14/22 68 kg (149 lb 14.6 oz)   12/02/21 80 kg (176 lb 5.9 oz)   11/21/21 72.8 kg (160 lb 7.9 oz)         Intake/Output Summary (Last 24 hours) at 1/14/2022 1427  Last data filed at 1/14/2022 1200  Gross per 24 hour   Intake 1459.7 ml   Output    Net 1459.7 ml       Patient seen and examined. Chart reviewed. Labs, data and other pertinent notes reviewed in last 24 hrs.     PMH/SH/FH reviewed and unchanged compared to H&P    Evy Gomez MD

## 2022-01-14 NOTE — PROCEDURES
ELECTROENCEPHALOGRAM REPORT    HISTORY: Patient is a 62-year-old male who is being evaluated for altered mental status and question of seizure activity. DESCRIPTION: This is an 18-channel EEG performed on an intubated, non-responsive patient. There is no dominant posterior background rhythm. Diffuse slowing with severe attenuation of cortical activity is noted throughout the recording. Photic stimulation does not elicit a significant driving response. Hyperventilation was not performed. ELECTROENCEPHALOGRAM SUMMARY: Abnormal EEG due to severe slowing of the background rhythms. CLINICAL INTERPRETATION: This EEG is suggestive of a severe generalized encephalopathic process, nonspecific in type. This may be related to underlying structural brain injury and/or toxic/metabolic abnormality. No clearly lateralizing or epileptiform features were seen.      Richardine Bosworth, DO  01/14/22

## 2022-01-14 NOTE — PROGRESS NOTES
[facility]    ICU TEAM Progress Note    Name: Evelyne Felix   : 1955   MRN: [id number]   Date: 2022           ICU Assessment     1. COVID 19  2. Acute hypoxic respiratory failure  3. Atelectasis  4. Vertebral stenosis not explaining exam change  5. Acute toxic encephalopathy  a. Differential includes COVID-19 or subclinical seizures  6. End-stage renal disease             ICU Comprehensive Plan of Care:     1. Neurological System  EEG demonstrated diffuse slowing  CT head with no change  ASA and Plavix  Appreciate neurology  Etiology of acute altered LOC likely acute metabolic encephalopathy    2. Cardiovascular System  BP improved  Pt has EF of 20-25 percent but cannot add beta blockade and ACE at this time due to low BP    3. Respiratory System  Wean FiO2 for pulse ox goal greater than 88%  SAT  SBT  On minimal settings but failing SBT. Likely will need tracheostomy. Called family to discuss today but no answer. 4. Renal/GI/Endocrine System  Appreciate nephrology  HD as needed    5. Infectious Disease  COVID-19    6. PT/OT: Not at this time    7. Goals of Care Discussion with family:  Sister would like pt to be full code. Will readdress. Appears to have poor prognosis. 8. Plan of Care/Code Status: Full    9. Discussed Care Plan with Bedside RN    10.  Documentation of Current Medications    Subjective:   Progress Note: 2022      Reason for ICU Admission: Pt fell out of bed at nursing home    HPI:Reason for ICU Admission: Acute hypoxic respiratory failure in the setting of acute encephalopathy     Overnight Events: Presented to the emergency room for evaluation having fallen out of bed at the nursing home overnight.     HPI: The patient is a 79-year-old male with a past medical history of diabetes, depression, stroke with left-sided deficit and dysarthria, hypertension, hyperlipidemia, CAD, end-stage renal disease, and BKA who presented to the emergency department having fallen out of bed. He continued to have altered mental status and was stroke alerted upon arrival to the emergency department. He was seen and evaluated by neurology and deemed not a candidate for tPA. CTA showed occluded cervical right vertebral artery with mild distal reconstitution in the distal occlusion at the V4 segment. Patient was given Plavix. Unfortunately patient had ongoing altered mental status and was therefore intubated by emergency medicine for airway protection.     Patient was subsequently found to be COVID-positive. He will now be admitted to the ICU for further work-up and management. Overnight Events:   1/11 - Await EEG, as he did not breathe over vent yesterday  May be due to [place] encephalopathy  1/12 -MRI done overnight, no occlusion, history of vertebral artery occlusion  1/13: No acute events overnight    POD:  * No surgery found *    Active Problem List:     Problem List  Never Reviewed          Codes Class    Coronary artery disease involving native coronary artery of native heart without angina pectoris (Chronic) ICD-10-CM: I25.10  ICD-9-CM: 414.01     Overview Signed 11/18/2021 12:29 PM by Barber Lynn MD     Cath from [facility]  6/20/17 for abnormal stress test  LM ok  LAD  prox with benny from RCA and distal LAD occluded  D1 prox 70  D2 prox 90  Circ pMLI  OM1 50%   RCA large, mid 40             NSTEMI (non-ST elevated myocardial infarction) ([facility] [address] ICD-10-CM: I21.4  ICD-9-CM: 410.70     Overview Signed 11/18/2021 12:30 PM by Barber Lynn MD     Peak troponin 2136 11/17/21  Hx of LAD  in 2017  EF 11/17/21 25%             Systolic CHF, acute on chronic [facility]) ICD-10-CM: I50.23  ICD-9-CM: 428.23, 428.0     Overview Signed 11/18/2021 12:31 PM by Barber Lynn MD     11/16/21    ECHO ADULT COMPLETE 11/18/2021 11/18/2021    Interpretation Summary  · LV: Estimated LVEF is 20 - 25%. Normal wall thickness. Mildly dilated left ventricle.  Moderate-to-severely and segmentally reduced systolic function. Severe hypokinesis of the basal anterolateral, mid anterolateral and apical lateral wall(s). Dyskinesis of the basal inferoseptal and mid anteroseptal wall(s). · AV: Severe aortic valve focal thickening present. Moderate aortic valve sclerosis with no evidence of reduced excursion. Aortic valve leaflet calcification present. · MV: Mitral valve thickening. Mitral valve leaflet calcification without reduced excursion. Mild to moderate mitral valve regurgitation is present. · TV: Mild tricuspid valve regurgitation is present. · LA: Mildly dilated left atrium. Signed by: Pawan Enriquez MD on 11/18/2021 12:08 AM                Respiratory failure (HCC) ICD-10-CM: J96.90  ICD-9-CM: 518.81         Anemia in chronic kidney disease ICD-10-CM: N18.9, D63.1  ICD-9-CM: 285.21         Diabetes mellitus due to underlying condition with diabetic nephropathy ([facility] [address] ICD-10-CM: E08.21  ICD-9-CM: 249.40, 583.81         End stage renal disease ([facility] [address] ICD-10-CM: N18.6  ICD-9-CM: 585.6               Past Medical History:      has a past medical history of Anemia, Coronary artery disease involving native coronary artery of native heart without angina pectoris (11/18/2021), Depression, Diabetes ([facility] [address] Dysphagia, GERD (gastroesophageal reflux disease), Hyperlipidemia, Hypertension, Nausea & vomiting, and Stroke ([facility] [address] (5/16/2003). Past Surgical History:      has a past surgical history that includes hx orthopaedic (7/20/2010); hx orthopaedic; hx amputation foot (Bilateral); upper gi endoscopy,biopsy (11/29/2021); and upper gi endoscopy,diagnosis (11/29/2021). Home Medications:     Prior to Admission medications    Medication Sig Start Date End Date Taking? Authorizing Provider   hydrALAZINE (APRESOLINE) 25 mg tablet Take 25 mg by mouth four (4) days a week.  Receives 25mg in morning and 25mg in evening on Mon/Wed/Fri/Sun   Yes Provider, Historical   hydrALAZINE (APRESOLINE) 25 mg tablet Take 25 mg by mouth four (4) days a week. Receives 25mg in morning and 25mg in evening on Mon/Wed/Fri/Sun   Yes Provider, Historical   carvediloL (COREG) 6.25 mg tablet Take 1 Tablet by mouth two (2) times daily (with meals). 11/21/21  Yes Katya Herrera MD   isosorbide dinitrate (ISORDIL) 10 mg tablet Take 1 Tablet by mouth three (3) times daily. 11/21/21  Yes Katya Herrera MD   sevelamer carbonate (Renvela) 0.8 gram pwpk oral powder Take 0.8 g by mouth three (3) times daily (with meals). 5/19/21  Yes Shukri, MD [facility]   atorvastatin (LIPITOR) 40 mg tablet Take 40 mg by mouth nightly. 11/1/21  Yes Shukri, MD [facility]   Combigan 0.2-0.5 % drop ophthalmic solution Administer 1 Drop to left eye every twelve (12) hours. Glaucoma 11/1/21  Yes Shukri, MD [facility]   chlorproMAZINE (THORAZINE) 50 mg tablet 50 mg three (3) times daily. 11/8/21  Yes Shukri, MD [facility]   clopidogreL (PLAVIX) 75 mg tab Take 75 mg by mouth daily. 11/7/21  Yes Shukri, MD [facility]   gabapentin (NEURONTIN) 100 mg capsule 100 mg nightly. 10/26/21  Yes Shukri, MD [facility]   insulin lispro (HUMALOG) 100 unit/mL injection by SubCUTAneous route Before breakfast, lunch, dinner and at bedtime. Inject per sliding scale, 0-200= 0; 201-250= 2 units; 251-300= 4 units; 301-350= 6 units; 351-400= 8 units; 401-999= 10 units. Greater than or equal to 401, give 10 units and CALL MD/NP, subcutaneously before meals and at bedtime for DM. 11/15/21  Yes Shukri, MD [facility]   latanoprost (XALATAN) 0.005 % ophthalmic solution Administer 1 Drop to left eye nightly. Unspecified Glaucoma 10/26/21  Yes Other, MD [facility]   omeprazole (PRILOSEC) 40 mg capsule Take 40 mg by mouth daily. 11/1/21  Yes Other, MD [facility]   sertraline (ZOLOFT) 50 mg tablet Take 50 mg by mouth daily. 11/1/21  Yes Other, MD [facility]   tamsulosin (FLOMAX) 0.4 mg capsule Take 0.4 mg by mouth nightly. 11/1/21  Yes Other, MD [facility]   Doxepin 3 mg tab Take 3 mg by mouth nightly.    Yes Other, MD [facility]   multivitamin (ONE A DAY) tablet Take 1 Tablet by mouth daily. Yes Shukri, MD [facility]   senna (Senna) 8.6 mg tablet Take 1 Tablet by mouth nightly. Yes Libertad Watt MD   sodium chloride (OCEAN) 0.65 % nasal squeeze bottle 1 [place] by Both Nostrils route three (3) times daily as needed for Congestion. Yes Shukri, MD [facility]   acetaminophen (TylenoL) 325 mg tablet Take 650 mg by mouth every four (4) hours as needed for Pain. Yes Shukri, MD [facility]   ergocalciferol (Vitamin D2) 1,250 mcg (50,000 unit) capsule Take 50,000 Units by mouth every seven (7) days. EVERY Monday. Yes [facility] Watt MD   zinc sulfate (ZINCATE) 50 mg zinc (220 mg) capsule Take 1 Capsule by mouth daily. Yes Libertad Watt MD   aspirin 81 mg tablet Take 81 mg by mouth daily. Yes Shukri, MD [facility]   Glutose-15 40 % oral gel  10/21/21   Other, MD [facility]   ascorbic acid, vitamin C, (Vitamin C) 500 mg tablet Take 500 mg by mouth two (2) times a day. Shukri, MD [facility]       Allergies/Social/Family History: Allergies   Allergen Reactions    Adhesive Tape-Silicones Rash    Trazodone Unknown (comments)     Listed in transfer paper 22      Social History     Tobacco Use    Smoking status: Never Smoker    Smokeless tobacco: Not on file   Substance Use Topics    Alcohol use: No      No family history on file. Review of Systems:     Pt intubated and sedated. Unable to give ROS    Objective:   Vital Signs:  Visit Vitals  /69   Pulse 71   Temp 98.1 °F (36.7 °C)   Resp 20   Ht 6' 3\" (1.905 m)   Wt 68 kg (149 lb 14.6 oz)   SpO2 100%   BMI 18.74 kg/m²      O2 Device: Endotracheal tube,Ventilator Temp (24hrs), Av.6 °F (37 °C), Min:97.6 °F (36.4 °C), Max:99.4 °F (37.4 °C)           Intake/Output:     Intake/Output Summary (Last 24 hours) at 2022 1444  Last data filed at 2022 1200  Gross per 24 hour   Intake 1459.7 ml   Output    Net 1459.7 ml       Physical Exam:    Elderly male in bed in NAD.   Cough present, Moves Right to pain with no response on R, L contracted  CV RRR  Pulm No rales appreciated  GI Abd soft  Skin warm, dry    LABS AND  DATA: Personally reviewed  Recent Labs     01/14/22 0537 01/13/22 0229   WBC 10.1 10.5   HGB 9.6* 11.2*   HCT 30.6* 36.1*    204     Recent Labs     01/14/22 0537 01/13/22 0229 01/12/22 0423 01/12/22 0423    136   < > 137   K 3.8 3.2*   < > 3.4*    100   < > 99   CO2 28 29   < > 27   BUN 37* 44*   < > 29*   CREA 5.82* 8.01*   < > 6.66*   * 212*   < > 193*   CA 10.0 9.9   < > 10.0   MG  --   --   --  2.5*   PHOS  --   --   --  2.8    < > = values in this interval not displayed. No results for input(s): AP, TBIL, TP, ALB, GLOB, AML, LPSE in the last 72 hours. No lab exists for component: SGOT, GPT, AMYP  No results for input(s): INR, PTP, APTT, INREXT, INREXT in the last 72 hours. No results for input(s): PHI, PCO2I, PO2I, FIO2I in the last 72 hours. No results for input(s): CPK, CKMB, TROIQ, BNPP in the last 72 hours. Ventilator Settings:  Mode Rate Tidal Volume Pressure FiO2 PEEP   Assist control,Volume control  16 600 ml  10 cm H2O 25 % 5 cm H20     Peak airway pressure: 19 cm H2O    Minute ventilation: 9.06 l/min        MEDS: Reviewed    Chest X-Ray:  CXR Results  (Last 48 hours)    None        Multidisciplinary Rounds Completed:  No    ABCDEF Bundle/Checklist Completed:  Yes    CRITICAL CARE CONSULTANT NOTE  I had a face to face encounter with the patient, reviewed and interpreted patient data including clinical events, labs, images, vital signs, I/O's, and examined patient. I have discussed the case and the plan and management of the patient's care with the consulting services, the bedside nurses and the respiratory therapist.      NOTE OF PERSONAL INVOLVEMENT IN CARE   This patient has a high probability of imminent, clinically significant deterioration, which requires the highest level of preparedness to intervene urgently.  I participated in the decision-making and personally managed or directed the management of the following life and organ supporting interventions that required my frequent assessment to treat or prevent imminent deterioration. I personally spent 60 minutes of critical care time. This is time spent at this critically ill patient's bedside actively involved in patient care as well as the coordination of care. This does not include any procedural time which has been billed separately.     Tiff Williamson MD   Staff Intensivist/Anesthesiologist  Critical Care Medicine

## 2022-01-15 NOTE — PROGRESS NOTES
[facility]    ICU TEAM Progress Note    Name: Darlene Branch   : 1955   MRN: [id number]   Date: 1/15/2022           ICU Assessment     1. COVID 19  2. Acute hypoxic respiratory failure  3. Atelectasis  4. Vertebral stenosis not explaining exam change  5. Acute toxic encephalopathy  a. Differential includes COVID-19 or subclinical seizures  6. End-stage renal disease             ICU Comprehensive Plan of Care:     1. Neurological System  EEG demonstrated diffuse slowing  CT head with no change  ASA and Plavix  Appreciate neurology  Etiology of acute altered LOC likely acute metabolic encephalopathy/COVID induced     2. Cardiovascular System  BP improved  Pt has EF of 20-25 percent but cannot add beta blockade and ACE at this time due to low BP    3. Respiratory System  Wean FiO2 for pulse ox goal greater than 88%  SAT  SBT  On minimal settings but failing SBT. Likely will need tracheostomy. 4. Renal/GI/Endocrine System  Appreciate nephrology  HD as needed    5. Infectious Disease  COVID-19    6. PT/OT: Not at this time    7. Goals of Care Discussion with family:  Sister would like pt to be full code. Will readdress. Appears to have poor prognosis. 8. Plan of Care/Code Status: Full    9. Discussed Care Plan with Bedside RN    10. Documentation of Current Medications    Subjective:   Progress Note: 1/15/2022      Reason for ICU Admission: Pt fell out of bed at nursing home    HPI:Reason for ICU Admission: Acute hypoxic respiratory failure in the setting of acute encephalopathy     Overnight Events: Presented to the emergency room for evaluation having fallen out of bed at the nursing home overnight.     HPI: The patient is a 54-year-old male with a past medical history of diabetes, depression, stroke with left-sided deficit and dysarthria, hypertension, hyperlipidemia, CAD, end-stage renal disease, and BKA who presented to the emergency department having fallen out of bed.   He continued to have altered mental status and was stroke alerted upon arrival to the emergency department. He was seen and evaluated by neurology and deemed not a candidate for tPA. CTA showed occluded cervical right vertebral artery with mild distal reconstitution in the distal occlusion at the V4 segment. Patient was given Plavix. Unfortunately patient had ongoing altered mental status and was therefore intubated by emergency medicine for airway protection.     Patient was subsequently found to be COVID-positive. He will now be admitted to the ICU for further work-up and management. Overnight Events:   1/11 - Await EEG, as he did not breathe over vent yesterday  May be due to Herrera Ally encephalopathy  1/12 -MRI done overnight, no occlusion, history of vertebral artery occlusion  1/13: No acute events overnight    POD:  * No surgery found *    Active Problem List:     Problem List  Never Reviewed          Codes Class    Coronary artery disease involving native coronary artery of native heart without angina pectoris (Chronic) ICD-10-CM: I25.10  ICD-9-CM: 414.01     Overview Signed 11/18/2021 12:29 PM by Thony Hu MD     Cath from [facility]  6/20/17 for abnormal stress test  LM ok  LAD  prox with benny from RCA and distal LAD occluded  D1 prox 70  D2 prox 90  Circ pMLI  OM1 50%   RCA large, mid 40             NSTEMI (non-ST elevated myocardial infarction) ([facility] [address] ICD-10-CM: I21.4  ICD-9-CM: 410.70     Overview Signed 11/18/2021 12:30 PM by Thony Hu MD     Peak troponin 2136 11/17/21  Hx of LAD  in 2017  EF 11/17/21 25%             Systolic CHF, acute on chronic [facility]) ICD-10-CM: I50.23  ICD-9-CM: 428.23, 428.0     Overview Signed 11/18/2021 12:31 PM by Thony Hu MD     11/16/21    ECHO ADULT COMPLETE 11/18/2021 11/18/2021    Interpretation Summary  · LV: Estimated LVEF is 20 - 25%. Normal wall thickness. Mildly dilated left ventricle.  Moderate-to-severely and segmentally reduced systolic function. Severe hypokinesis of the basal anterolateral, mid anterolateral and apical lateral wall(s). Dyskinesis of the basal inferoseptal and mid anteroseptal wall(s). · AV: Severe aortic valve focal thickening present. Moderate aortic valve sclerosis with no evidence of reduced excursion. Aortic valve leaflet calcification present. · MV: Mitral valve thickening. Mitral valve leaflet calcification without reduced excursion. Mild to moderate mitral valve regurgitation is present. · TV: Mild tricuspid valve regurgitation is present. · LA: Mildly dilated left atrium. Signed by: Geoff Mercer MD on 11/18/2021 12:08 AM                Respiratory failure (HCC) ICD-10-CM: J96.90  ICD-9-CM: 518.81         Anemia in chronic kidney disease ICD-10-CM: N18.9, D63.1  ICD-9-CM: 285.21         Diabetes mellitus due to underlying condition with diabetic nephropathy ([facility] [address] ICD-10-CM: E08.21  ICD-9-CM: 249.40, 583.81         End stage renal disease ([facility] [address] ICD-10-CM: N18.6  ICD-9-CM: 585.6               Past Medical History:      has a past medical history of Anemia, Coronary artery disease involving native coronary artery of native heart without angina pectoris (11/18/2021), Depression, Diabetes ([facility] [address] Dysphagia, GERD (gastroesophageal reflux disease), Hyperlipidemia, Hypertension, Nausea & vomiting, and Stroke ([facility] [address] (5/16/2003). Past Surgical History:      has a past surgical history that includes hx orthopaedic (7/20/2010); hx orthopaedic; hx amputation foot (Bilateral); upper gi endoscopy,biopsy (11/29/2021); and upper gi endoscopy,diagnosis (11/29/2021). Home Medications:     Prior to Admission medications    Medication Sig Start Date End Date Taking? Authorizing Provider   hydrALAZINE (APRESOLINE) 25 mg tablet Take 25 mg by mouth four (4) days a week.  Receives 25mg in morning and 25mg in evening on Mon/Wed/Fri/Sun   Yes Provider, Historical   hydrALAZINE (APRESOLINE) 25 mg tablet Take 25 mg by mouth four (4) days a week. Receives 25mg in morning and 25mg in evening on Mon/Wed/Fri/Sun   Yes Provider, Historical   carvediloL (COREG) 6.25 mg tablet Take 1 Tablet by mouth two (2) times daily (with meals). 11/21/21  Yes Dionna Martinez MD   isosorbide dinitrate (ISORDIL) 10 mg tablet Take 1 Tablet by mouth three (3) times daily. 11/21/21  Yes Dionna Martinez MD   sevelamer carbonate (Renvela) 0.8 gram pwpk oral powder Take 0.8 g by mouth three (3) times daily (with meals). 5/19/21  Yes Other, MD Libertad   atorvastatin (LIPITOR) 40 mg tablet Take 40 mg by mouth nightly. 11/1/21  Yes Other, MD Libertad   Combigan 0.2-0.5 % drop ophthalmic solution Administer 1 Drop to left eye every twelve (12) hours. Glaucoma 11/1/21  Yes Other, MD Libertad   chlorproMAZINE (THORAZINE) 50 mg tablet 50 mg three (3) times daily. 11/8/21  Yes Other, MD Libertad   clopidogreL (PLAVIX) 75 mg tab Take 75 mg by mouth daily. 11/7/21  Yes Shukri, MD Libertad   gabapentin (NEURONTIN) 100 mg capsule 100 mg nightly. 10/26/21  Yes Shukri, MD Libertad   insulin lispro (HUMALOG) 100 unit/mL injection by SubCUTAneous route Before breakfast, lunch, dinner and at bedtime. Inject per sliding scale, 0-200= 0; 201-250= 2 units; 251-300= 4 units; 301-350= 6 units; 351-400= 8 units; 401-999= 10 units. Greater than or equal to 401, give 10 units and CALL MD/NP, subcutaneously before meals and at bedtime for DM. 11/15/21  Yes Shukri, MD Libertad   latanoprost (XALATAN) 0.005 % ophthalmic solution Administer 1 Drop to left eye nightly. Unspecified Glaucoma 10/26/21  Yes Other, MD Libertad   omeprazole (PRILOSEC) 40 mg capsule Take 40 mg by mouth daily. 11/1/21  Yes Other, MD Libertad   sertraline (ZOLOFT) 50 mg tablet Take 50 mg by mouth daily. 11/1/21  Yes Other, MD Libertad   tamsulosin (FLOMAX) 0.4 mg capsule Take 0.4 mg by mouth nightly. 11/1/21  Yes Other, MD Libertad   Doxepin 3 mg tab Take 3 mg by mouth nightly.    Yes Other, MD Libertad   multivitamin (ONE A DAY) tablet Take 1 Tablet by mouth daily. Yes Shukri, MD Libertad   senna (Senna) 8.6 mg tablet Take 1 Tablet by mouth nightly. Yes Libertad Watt MD   sodium chloride (OCEAN) 0.65 % nasal squeeze bottle 1 [place] by Both Nostrils route three (3) times daily as needed for Congestion. Yes Shukri, MD Libertad   acetaminophen (TylenoL) 325 mg tablet Take 650 mg by mouth every four (4) hours as needed for Pain. Yes Shukri, MD Libertad   ergocalciferol (Vitamin D2) 1,250 mcg (50,000 unit) capsule Take 50,000 Units by mouth every seven (7) days. EVERY Monday. Yes Shukri, MD Libertad   zinc sulfate (ZINCATE) 50 mg zinc (220 mg) capsule Take 1 Capsule by mouth daily. Yes Libertad Watt MD   aspirin 81 mg tablet Take 81 mg by mouth daily. Yes Shukri, MD Libertad   Glutose-15 40 % oral gel  10/21/21   Other, MD Libertad   ascorbic acid, vitamin C, (Vitamin C) 500 mg tablet Take 500 mg by mouth two (2) times a day. Other, MD Libertad       Allergies/Social/Family History: Allergies   Allergen Reactions    Adhesive Tape-Silicones Rash    Trazodone Unknown (comments)     Listed in transfer paper 22      Social History     Tobacco Use    Smoking status: Never Smoker    Smokeless tobacco: Not on file   Substance Use Topics    Alcohol use: No      No family history on file. Review of Systems:     Pt intubated and sedated. Unable to give ROS    Objective:   Vital Signs:  Visit Vitals  /74   Pulse 66   Temp 97.4 °F (36.3 °C) (Axillary)   Resp 16   Ht 6' 3\" (1.905 m)   Wt 70.8 kg (156 lb 1.4 oz)   SpO2 100%   BMI 19.51 kg/m²      O2 Device: Endotracheal tube,Ventilator Temp (24hrs), Av.9 °F (36.6 °C), Min:97.4 °F (36.3 °C), Max:98.7 °F (37.1 °C)           Intake/Output:     Intake/Output Summary (Last 24 hours) at 1/15/2022 1451  Last data filed at 1/15/2022 1245  Gross per 24 hour   Intake 880 ml   Output 1100 ml   Net -220 ml       Physical Exam:    Elderly male in bed in NAD.   Cough present, Moves Right to pain with no response on R, L contracted  CV RRR  Pulm No rales appreciated  GI Abd soft  Skin warm, dry    LABS AND  DATA: Personally reviewed  Recent Labs     01/15/22  0447 01/14/22  0537   WBC 9.0 10.1   HGB 10.0* 9.6*   HCT 32.7* 30.6*    197     Recent Labs     01/15/22  0447 01/14/22  0537    137   K 3.8 3.8    101   CO2 28 28   BUN 52* 37*   CREA 7.13* 5.82*   * 188*   CA 9.9 10.0   PHOS 1.7* 1.7*     No results for input(s): AP, TBIL, TP, ALB, GLOB, AML, LPSE in the last 72 hours. No lab exists for component: SGOT, GPT, AMYP  No results for input(s): INR, PTP, APTT, INREXT, INREXT in the last 72 hours. No results for input(s): PHI, PCO2I, PO2I, FIO2I in the last 72 hours. No results for input(s): CPK, CKMB, TROIQ, BNPP in the last 72 hours. Ventilator Settings:  Mode Rate Tidal Volume Pressure FiO2 PEEP   Assist control,Volume control  16 600 ml  10 cm H2O 25 % 5 cm H20     Peak airway pressure: 21 cm H2O    Minute ventilation: 7.49 l/min        MEDS: Reviewed    Chest X-Ray:  CXR Results  (Last 48 hours)    None        Multidisciplinary Rounds Completed:  No    ABCDEF Bundle/Checklist Completed:  Yes    CRITICAL CARE CONSULTANT NOTE  I had a face to face encounter with the patient, reviewed and interpreted patient data including clinical events, labs, images, vital signs, I/O's, and examined patient. I have discussed the case and the plan and management of the patient's care with the consulting services, the bedside nurses and the respiratory therapist.      NOTE OF PERSONAL INVOLVEMENT IN CARE   This patient has a high probability of imminent, clinically significant deterioration, which requires the highest level of preparedness to intervene urgently. I participated in the decision-making and personally managed or directed the management of the following life and organ supporting interventions that required my frequent assessment to treat or prevent imminent deterioration.     I personally spent 30 minutes of critical care time. This is time spent at this critically ill patient's bedside actively involved in patient care as well as the coordination of care. This does not include any procedural time which has been billed separately.     Jenny Gregory MD   Staff Intensivist/Anesthesiologist  Critical Care Medicine

## 2022-01-15 NOTE — PROGRESS NOTES
1930: Bedside and Verbal shift change report given to Temi Lockhart (oncoming nurse) by Reyes Brisker RN (offgoing nurse). Report included the following information SBAR, Kardex, ED Summary, MAR, Recent Results, and Cardiac Rhythm NSR .

## 2022-01-15 NOTE — PROGRESS NOTES
Name: Evelyne Felix   MRN: [id number]  : 1955        Assessment:                                    Plan:  ESRD-T//S--FMC/MCV  Intubated  Low K/Phos  COVID (+)  CVA  (B) amputee  DM  Occluded (R) vertebral artery  CMO ef 20-25%  Anemia Seen on HD at 1140. SBP stable. Albumin for BP support. UF of 1-2 Kg as benito        Ct WARREN    D/W Analilia Mccullough RN    Will see again Monday. Please call if any questions.        Subjective:  Seen thru glass door in ICU  Chart revd  D/W RN  No events overnight  EEG results noted    ROS:   Deferred    Exam:  Visit Vitals  BP 95/68   Pulse 66   Temp 97.5 °F (36.4 °C) (Axillary)   Resp 18   Ht 6' 3\" (1.905 m)   Wt 70.8 kg (156 lb 1.4 oz)   SpO2 100%   BMI 19.51 kg/m²     Chronic ill appearing in mild resp distress  +ETT  +NGT  No visible resp distress    Current Facility-Administered Medications   Medication Dose Route Frequency Last Admin    albuterol (PROVENTIL VENTOLIN) nebulizer solution 2.5 mg  2.5 mg Nebulization Q4H PRN      zinc oxide-cod liver oil (DESITIN) 40 % paste   Topical Q8H Given at 01/15/22 0631    insulin glargine (LANTUS) injection 10 Units  10 Units SubCUTAneous DAILY 10 Units at 01/15/22 0808    chlorhexidine (ORAL CARE KIT) 0.12 % mouthwash 15 mL  15 mL Oral Q12H 15 mL at 01/15/22 0809    heparin (porcine) injection 5,000 Units  5,000 Units SubCUTAneous Q8H 5,000 Units at 01/15/22 0631    midazolam (VERSED) injection 2 mg  2 mg IntraVENous Q30MIN PRN 2 mg at 226    dexmedeTOMidine in 0.9 % NaCl (PRECEDEX) 400 mcg/100 mL (4 mcg/mL) infusion soln  0.1-1.5 mcg/kg/hr IntraVENous TITRATE 0.5 mcg/kg/hr at 01/15/22 0640    atorvastatin (LIPITOR) tablet 40 mg  40 mg Oral DAILY 40 mg at 01/15/22 0808    sodium chloride (NS) flush 5-40 mL  5-40 mL IntraVENous Q8H 10 mL at 01/15/22 0631    sodium chloride (NS) flush 5-40 mL  5-40 mL IntraVENous PRN      acetaminophen (TYLENOL) tablet 650 mg  650 mg Oral Q6H PRN      Or    acetaminophen (TYLENOL) suppository 650 mg  650 mg Rectal Q6H PRN      polyethylene glycol (MIRALAX) packet 17 g  17 g Oral DAILY PRN      ondansetron (ZOFRAN ODT) tablet 4 mg  4 mg Oral Q8H PRN      Or    ondansetron (ZOFRAN) injection 4 mg  4 mg IntraVENous Q6H PRN      glucose chewable tablet 16 g  4 Tablet Oral PRN      dextrose (D50W) injection syrg 12.5-25 g  25-50 mL IntraVENous PRN 25 g at 01/10/22 0911    glucagon (GLUCAGEN) injection 1 mg  1 mg IntraMUSCular PRN      insulin lispro (HUMALOG) injection   SubCUTAneous Q6H 4 Units at 01/15/22 0640    lansoprazole (PREVACID) 3 mg/mL oral suspension 30 mg  30 mg Per NG tube ACB 30 mg at 01/15/22 0808    albumin human 25% (BUMINATE) solution 25 g  25 g IntraVENous PRN 25 g at 01/15/22 1126    [Held by provider] epoetin glenny-epbx (RETACRIT) injection 4,000 Units  4,000 Units SubCUTAneous Q TUE, THU & SAT 4,000 Units at 01/08/22 2121    aspirin tablet 325 mg  325 mg Oral DAILY 325 mg at 01/15/22 0808    clopidogreL (PLAVIX) tablet 75 mg  75 mg Oral DAILY 75 mg at 01/15/22 9491       Labs/Data:    Lab Results   Component Value Date/Time    WBC 9.0 01/15/2022 04:47 AM    HGB 10.0 (L) 01/15/2022 04:47 AM    HCT 32.7 (L) 01/15/2022 04:47 AM    PLATELET 325 36/27/7849 04:47 AM    MCV 95.3 01/15/2022 04:47 AM       Lab Results   Component Value Date/Time    Sodium 137 01/15/2022 04:47 AM    Potassium 3.8 01/15/2022 04:47 AM    Chloride 100 01/15/2022 04:47 AM    CO2 28 01/15/2022 04:47 AM    Anion gap 9 01/15/2022 04:47 AM    Glucose 269 (H) 01/15/2022 04:47 AM    BUN 52 (H) 01/15/2022 04:47 AM    Creatinine 7.13 (H) 01/15/2022 04:47 AM    BUN/Creatinine ratio 7 (L) 01/15/2022 04:47 AM    GFR est AA 9 (L) 01/15/2022 04:47 AM    GFR est non-AA 8 (L) 01/15/2022 04:47 AM    Calcium 9.9 01/15/2022 04:47 AM       Wt Readings from Last 3 Encounters: 01/15/22 70.8 kg (156 lb 1.4 oz)   12/02/21 80 kg (176 lb 5.9 oz)   11/21/21 72.8 kg (160 lb 7.9 oz)         Intake/Output Summary (Last 24 hours) at 1/15/2022 1139  Last data filed at 1/15/2022 0900  Gross per 24 hour   Intake 1040 ml   Output 0 ml   Net 1040 ml       Patient seen and examined. Chart reviewed. Labs, data and other pertinent notes reviewed in last 24 hrs.     PMH/SH/FH reviewed and unchanged compared to H&P    Cherri Stoddard MD

## 2022-01-15 NOTE — PROGRESS NOTES
0730 Bedside and Verbal shift change report given to Sindy García RN (oncoming nurse) by Mir Meyer RN (offgoing nurse). Report included the following information SBAR, Kardex, ED Summary, Intake/Output, MAR, Accordion, Recent Results, Med Rec Status, Cardiac Rhythm sinus rhythm, Alarm Parameters  and Dual Neuro Assessment. 1335 This RN returning call to pt's sister, Marti Schneider. Updated Mrs. Christina Tompkins on pt's status. Mrs. Christina Tompkins inquired about whether the intensivist had spoken to pt's cardiologist yet. Pt's cardiologist, Landry Dinh had called Mrs. Christina Tompikns and wanted to speak to intensivist regarding results from pt's cardiac monitor that he wore last month. Advised Mrs. Christina Tompkins that I would pass along information. Sahil Vivas number is [phone].     2546 This RN letting Geovany Huerta MD know that pt is having hiccups. No new orders at this time. 1900 Pt coughing and setting vent off. Kangaroo pump alarming flow error. This RN attempted to flush DHT, tube will not flush. Will replace DHT    1930 Bedside and Verbal shift change report given to DEO Mireles (oncoming nurse) by Sindy García RN (offgoing nurse). Report included the following information SBAR, Kardex, ED Summary, Procedure Summary, Intake/Output, MAR, Accordion, Recent Results, Med Rec Status, Cardiac Rhythm Sinus rhythm, Alarm Parameters  and Dual Neuro Assessment.

## 2022-01-15 NOTE — DIALYSIS
Hemodialysis / [phone]    Vitals Pre Post Assessment Pre Post   BP BP: 95/68 (01/15/22 1100) 124/74 LOC sedated sedated   HR Pulse (Heart Rate): 66 (01/15/22 1100) 66 Lungs coarse coarse   Resp Resp Rate: 18 (01/15/22 1100) 14 Cardiac regular regular   Temp Temp: 97.5 °F (36.4 °C) (01/15/22 0840) 98.7 Skin warm warm   Weight Pre-Dialysis Weight: 70 kg (154 lb 5.2 oz) (01/15/22 0840) 69   Edema No edema No edema   Tele status bedside bedside Pain Pain Intensity 1: 0 (01/15/22 0800)      Orders   Duration: Start: 0845 End: 1245 Total: 4 hr   Dialyzer: Dialyzer/Set Up Inspection: Jayy ([id number]) (01/15/22 0840)   K Bath: Dialysate K (mEq/L): 4 (01/15/22 0840)   Ca Bath: Dialysate CA (mEq/L): 2.5 (01/15/22 0840)   Na: Dialysate NA (mEq/L): 140 (01/15/22 0840)   Bicarb: Dialysate HCO3 (mEq/L): 40 (01/15/22 0840)   Target Fluid Removal: Goal/Amount of Fluid to Remove (mL): 2000 mL (01/15/22 0840)     Access   Type & Location: RLA AVF   Comments:      Patent, B&T positive, cannulated with 15 g needles x 2                                  Labs   HBsAg (Antigen) / date:       01/08/22 Negative                                        HBsAb (Antibody) / date: 01/08/22 Immune   Source: [facility]   Obtained/Reviewed  Critical Results Called HGB   Date Value Ref Range Status   01/15/2022 10.0 (L) 12.1 - 17.0 g/dL Final     Potassium   Date Value Ref Range Status   01/15/2022 3.8 3.5 - 5.1 mmol/L Final     Calcium   Date Value Ref Range Status   01/15/2022 9.9 8.5 - 10.1 MG/DL Final     BUN   Date Value Ref Range Status   01/15/2022 52 (H) 6 - 20 MG/DL Final     Creatinine   Date Value Ref Range Status   01/15/2022 7.13 (H) 0.70 - 1.30 MG/DL Final        Meds Given   Name Dose Route                    Adequacy / Fluid    Total Liters Process: 88 L   Net Fluid Removed: 1000 ml      Comments   Time Out Done:   (Time) Yes, 0840   Admitting Diagnosis: Renal failure   Consent obtained/signed: Informed Consent Verified: Yes (01/13/22 7990)   Machine / RO # Machine Number: [id number] (01/15/22 7729)   Primary Nurse Rpt Pre: Sherman Randolph   Primary Nurse Rpt Post: Kranthi Todd RN   Pt Education: Unable d/t sedation    Care Plan: continue HD as ordered   Pts outpatient clinic:      Tx Summary   Comments:         Tolerated tx well, at end, all blood in circuit returned with 300 ml NS,RLLA AVF patent, B&T positive, bleeding stopped at both sites, pressure dressing in place,

## 2022-01-16 NOTE — PROGRESS NOTES
1930: Bedside and Verbal shift change report given to [address] (oncoming nurse) by Cordell Prader RN (offgoing nurse). Report included the following information SBAR, Kardex, ED Summary, Intake/Output, MAR, Recent Results, and Cardiac Rhythm NSR .

## 2022-01-16 NOTE — PROGRESS NOTES
[facility]    ICU TEAM Progress Note    Name: Jo Ramirez   : 1955   MRN: [id number]   Date: 2022      I  Subjective:   Progress Note: 2022      Reason for ICU Admission: Altered mental status, respiratory failure, COVID-19 infection    Interval history: From critical care H&P on   The patient is a 54-year-old male with a past medical history of diabetes, depression, stroke with left-sided deficit and dysarthria, hypertension, hyperlipidemia, CAD, end-stage renal disease, and BKA who presented to the emergency department having fallen out of bed. [facility] continued to have altered mental status and was stroke alerted upon arrival to the emergency department. [facility] was seen and evaluated by neurology and deemed not a candidate for tPA. Eh Scrivener showed occluded cervical right vertebral artery with mild distal reconstitution in the distal occlusion at the V4 segment.  Patient was given Plavix.  Unfortunately patient had ongoing altered mental status and was therefore intubated by emergency medicine for airway protection. Patient was subsequently found to be Yvon Guzman will now be admitted to the ICU for further work-up and management. Overnight Events:   : No acute event overnight.   Reportedly failing SBT due to apnea and neurological status    Active Problem List:     Problem List  Never Reviewed          Codes Class    Coronary artery disease involving native coronary artery of native heart without angina pectoris (Chronic) ICD-10-CM: I25.10  ICD-9-CM: 414.01     Overview Signed 2021 12:29 PM by Donny Newton MD     Cath from [facility]  17 for abnormal stress test  LM ok  LAD  prox with benny from RCA and distal LAD occluded  D1 prox 70  D2 prox 90  Circ pMLI  OM1 50%   RCA large, mid 40             NSTEMI (non-ST elevated myocardial infarction) ([facility] [address] ICD-10-CM: I21.4  ICD-9-CM: 410.70     Overview Signed 2021 12:30 PM by Donny Newton MD     Peak troponin 2136 11/17/21  Hx of LAD  in 2017  EF 11/17/21 25%             Systolic CHF, acute on chronic [facility]) ICD-10-CM: I50.23  ICD-9-CM: 428.23, 428.0     Overview Signed 11/18/2021 12:31 PM by Brooke Durán MD     11/16/21    ECHO ADULT COMPLETE 11/18/2021 11/18/2021    Interpretation Summary  · LV: Estimated LVEF is 20 - 25%. Normal wall thickness. Mildly dilated left ventricle. Moderate-to-severely and segmentally reduced systolic function. Severe hypokinesis of the basal anterolateral, mid anterolateral and apical lateral wall(s). Dyskinesis of the basal inferoseptal and mid anteroseptal wall(s). · AV: Severe aortic valve focal thickening present. Moderate aortic valve sclerosis with no evidence of reduced excursion. Aortic valve leaflet calcification present. · MV: Mitral valve thickening. Mitral valve leaflet calcification without reduced excursion. Mild to moderate mitral valve regurgitation is present. · TV: Mild tricuspid valve regurgitation is present. · LA: Mildly dilated left atrium. Signed by: Brooke Durán MD on 11/18/2021 12:08 AM                Respiratory failure (HCC) ICD-10-CM: J96.90  ICD-9-CM: 518.81         Anemia in chronic kidney disease ICD-10-CM: N18.9, D63.1  ICD-9-CM: 285.21         Diabetes mellitus due to underlying condition with diabetic nephropathy ([facility] [address] ICD-10-CM: E08.21  ICD-9-CM: 249.40, 583.81         End stage renal disease ([facility] [address] ICD-10-CM: N18.6  ICD-9-CM: 585.6               Past Medical History:      has a past medical history of Anemia, Coronary artery disease involving native coronary artery of native heart without angina pectoris (11/18/2021), Depression, Diabetes ([address] Dysphagia, GERD (gastroesophageal reflux disease), Hyperlipidemia, Hypertension, Nausea & vomiting, and Stroke ([facility] [address] (5/16/2003).     Past Surgical History:      has a past surgical history that includes hx orthopaedic (7/20/2010); hx orthopaedic; hx amputation foot (Bilateral); upper gi endoscopy,biopsy (11/29/2021); and upper gi endoscopy,diagnosis (11/29/2021). Home Medications:     Prior to Admission medications    Medication Sig Start Date End Date Taking? Authorizing Provider   hydrALAZINE (APRESOLINE) 25 mg tablet Take 25 mg by mouth four (4) days a week. Receives 25mg in morning and 25mg in evening on Mon/Wed/Fri/Sun   Yes Provider, Historical   hydrALAZINE (APRESOLINE) 25 mg tablet Take 25 mg by mouth four (4) days a week. Receives 25mg in morning and 25mg in evening on Mon/Wed/Fri/Sun   Yes Provider, Historical   carvediloL (COREG) 6.25 mg tablet Take 1 Tablet by mouth two (2) times daily (with meals). 11/21/21  Yes Sergio Evans MD   isosorbide dinitrate (ISORDIL) 10 mg tablet Take 1 Tablet by mouth three (3) times daily. 11/21/21  Yes Sergio Evans MD   sevelamer carbonate (Renvela) 0.8 gram pwpk oral powder Take 0.8 g by mouth three (3) times daily (with meals). 5/19/21  Yes Shukri, MD [facility]   atorvastatin (LIPITOR) 40 mg tablet Take 40 mg by mouth nightly. 11/1/21  Yes Libertad Watt MD   Combigan 0.2-0.5 % drop ophthalmic solution Administer 1 Drop to left eye every twelve (12) hours. Glaucoma 11/1/21  Yes Libertad Watt MD   chlorproMAZINE (THORAZINE) 50 mg tablet 50 mg three (3) times daily. 11/8/21  Yes Libertad Watt MD   clopidogreL (PLAVIX) 75 mg tab Take 75 mg by mouth daily. 11/7/21  Yes Shukri, MD [facility]   gabapentin (NEURONTIN) 100 mg capsule 100 mg nightly. 10/26/21  Yes Libertad Watt MD   insulin lispro (HUMALOG) 100 unit/mL injection by SubCUTAneous route Before breakfast, lunch, dinner and at bedtime. Inject per sliding scale, 0-200= 0; 201-250= 2 units; 251-300= 4 units; 301-350= 6 units; 351-400= 8 units; 401-999= 10 units. Greater than or equal to 401, give 10 units and CALL MD/NP, subcutaneously before meals and at bedtime for DM. 11/15/21  Yes Other, MD [facility]   latanoprost (XALATAN) 0.005 % ophthalmic solution Administer 1 Drop to left eye nightly.  Unspecified Glaucoma 10/26/21  Yes Other, MD [facility]   omeprazole (PRILOSEC) 40 mg capsule Take 40 mg by mouth daily. 11/1/21  Yes Shukri, MD [facility]   sertraline (ZOLOFT) 50 mg tablet Take 50 mg by mouth daily. 11/1/21  Yes Other, MD [facility]   tamsulosin (FLOMAX) 0.4 mg capsule Take 0.4 mg by mouth nightly. 11/1/21  Yes Shukri, MD [facility]   Doxepin 3 mg tab Take 3 mg by mouth nightly. Yes Other, MD [facility]   multivitamin (ONE A DAY) tablet Take 1 Tablet by mouth daily. Yes Other, MD [facility]   senna (Senna) 8.6 mg tablet Take 1 Tablet by mouth nightly. Yes Other, MD [facility]   sodium chloride (OCEAN) 0.65 % nasal squeeze bottle 1 [place] by Both Nostrils route three (3) times daily as needed for Congestion. Yes Other, MD [facility]ad   acetaminophen (TylenoL) 325 mg tablet Take 650 mg by mouth every four (4) hours as needed for Pain. Yes Other, MD [facility]   ergocalciferol (Vitamin D2) 1,250 mcg (50,000 unit) capsule Take 50,000 Units by mouth every seven (7) days. EVERY Monday. Yes Other, MD [facility]   zinc sulfate (ZINCATE) 50 mg zinc (220 mg) capsule Take 1 Capsule by mouth daily. Yes Other, MD [facility]ad   aspirin 81 mg tablet Take 81 mg by mouth daily. Yes Other, MD [facility]   Glutose-15 40 % oral gel  10/21/21   Other, MD [facility]ad   ascorbic acid, vitamin C, (Vitamin C) 500 mg tablet Take 500 mg by mouth two (2) times a day. Other, MD [facility]ad       Allergies/Social/Family History: Allergies   Allergen Reactions    Adhesive Tape-Silicones Rash    Trazodone Unknown (comments)     Listed in transfer paper 01/08/22      Social History     Tobacco Use    Smoking status: Never Smoker    Smokeless tobacco: Not on file   Substance Use Topics    Alcohol use: No      No family history on file.     Review of Systems:     Not able to obtain due to patient medical condition    Objective:   Vital Signs:  Visit Vitals  /63   Pulse 68   Temp 99.2 °F (37.3 °C)   Resp 17   Ht 6' 3\" (1.905 m)   Wt 69.1 kg (152 lb 5.4 oz)   SpO2 96%   BMI 19.04 kg/m² O2 Device: Ventilator,Endotracheal tube Temp (24hrs), Av.5 °F (36.9 °C), Min:97.4 °F (36.3 °C), Max:99.2 °F (37.3 °C)           Intake/Output:     Intake/Output Summary (Last 24 hours) at 2022 0849  Last data filed at 2022 0700  Gross per 24 hour   Intake 636.19 ml   Output 1100 ml   Net -463.81 ml       Physical Exam:  Elderly male in bed in NAD. CV RRR  Pulm No rales appreciated  GI Abd soft  Skin warm, dry    LABS AND  DATA: Personally reviewed  Recent Labs     01/16/22  0426 01/15/22  0447   WBC 7.6 9.0   HGB 10.3* 10.0*   HCT 33.1* 32.7*    223     Recent Labs     01/16/22  0426 01/15/22  0447 22  0537 22  0537    137   < > 137   K 3.8 3.8   < > 3.8   CL 97 100   < > 101   CO2 31 28   < > 28   BUN 33* 52*   < > 37*   CREA 5.06* 7.13*   < > 5.82*   * 269*   < > 188*   CA 10.1 9.9   < > 10.0   PHOS  --  1.7*  --  1.7*    < > = values in this interval not displayed. No results for input(s): AP, TBIL, TP, ALB, GLOB, AML, LPSE in the last 72 hours. No lab exists for component: SGOT, GPT, AMYP  No results for input(s): INR, PTP, APTT, INREXT in the last 72 hours. No results for input(s): PHI, PCO2I, PO2I, FIO2I in the last 72 hours. No results for input(s): CPK, CKMB, TROIQ, BNPP in the last 72 hours.     Hemodynamics:   PAP:   CO:     Wedge:   CI:     CVP:    SVR:       PVR:       Ventilator Settings:  Mode Rate Tidal Volume Pressure FiO2 PEEP   Assist control,Volume control   600 ml  10 cm H2O 25 % 5 cm H20     Peak airway pressure: 17 cm H2O    Minute ventilation: 12.5 l/min        MEDS: Reviewed    Chest X-Ray:  CXR Results  (Last 48 hours)    None          Assessment and Plan:   Acute encephalopathy:   End-stage renal disease on hemodialysis:   Previous stroke with chronic left hemiplegia and other neurological deficits:   COVID-19 infection  Chronic congestive heart failure with ejection fraction 15-20%:  Respiratory failure: Multifactorial including COVID-19 infection and neurological status affecting ability to protect airway    1. Neurological System  EEG demonstrated diffuse slowing  CT head with no change  ASA and Plavix, Appreciate neurology     2. Cardiovascular System  BP improved  Pt has EF of 20-25 percent but cannot add beta blockade and ACE at this time due to low BP     3. Respiratory System  Wean FiO2 for pulse ox goal greater than 88%  SAT, SBT  On minimal settings but failing SBT. I believe his neurological status significantly precluding extubation. At this time does not seem to be appropriate for long-term tracheostomy. We will consult palliative care to further decide goal of care        4. Renal/GI/Endocrine System  Appreciate nephrology, HD as scheduled     5. Infectious Disease  COVID-19    DVT and GI prophylaxis, ventilator bundle. Prognosis is poor. Consult palliative care. Addendum:  I placed patient on pressure support 8/5. He was able to sustain this for about an hour before he became tachypneic with low tidal volume. SIMV mode resumed. DISPOSITION  Stay in ICU    CRITICAL CARE CONSULTANT NOTE  I had a face to face encounter with the patient, reviewed and interpreted patient data including clinical events, labs, images, vital signs, I/O's, and examined patient. I have discussed the case and the plan and management of the patient's care with the consulting services, the bedside nurses and the respiratory therapist.      NOTE OF PERSONAL INVOLVEMENT IN CARE   This patient has a high probability of imminent, clinically significant deterioration, which requires the highest level of preparedness to intervene urgently. I participated in the decision-making and personally managed or directed the management of the following life and organ supporting interventions that required my frequent assessment to treat or prevent imminent deterioration. I personally spent 40 minutes of critical care time.   This is time spent at this critically ill patient's bedside actively involved in patient care as well as the coordination of care and discussions with the patient's family. This does not include any procedural time which has been billed separately. Hansa Street M.D.   Staff Intensivist/Pulmonologist  [facility]  1/16/2022

## 2022-01-16 NOTE — PROGRESS NOTES
Neurology Progress Note  Erica White NP    Patient: Mata Watts MRN: [id number]  SSN: [id number]    YOB: 1955  Age: 78 y.o. Sex: male      Chief Complaint: Continued unresponsiveness, involuntary movements    Subjective:      Mata Watts is a 78 y.o. M with a past medical history of CVA, HTN, hyperlipidemia, GERD, dysphagia, DM, CAD, anemia, GERD, depression, bilateral BKA, and CKD on dialysis who presented to the ER at [facility] via EMS in the early am on 01/08/22 after being found on the floor at [facility] after falling out of bed at approximately 0100. He was unresponsive. He was known to be positive with COVID-19. A Code Stroke was called on his arrival to the ER. [facility] showed no acute abnormality. CTA H/N showed extensive vertebrobasilar disease with an occluded cervical right vertebral artery with some mild distal reconstitution and distal occlusion at the V4 segment. There was a dominant left VA with severe stenosis of the V4 segment on the left side. The basilar artery was with diffuse stenosis. Corresponding perfusion abnormalities were noted in the brainstem. No LVO. He was not a candidate for tPA or for mechanical thrombectomy. Patient was intubated in ER to protect his airway. According to record he had been on aspirin 81 mg q day, Plavix 75 mg po q day and Lipitor 40 mg po q day. He was loaded with aspirin and Plavix in the ER and he has continued on aspirin, Plavix and Lipitor during his admission. There was concern for possible subclinical seizure due to his unresponsiveness, so EEG was completed on 01/10/22 which showed no seizure activity. There was a mild generalized encephalopathic process. Neurology was re-consulted due to patient remaining unresponsive and now having some possible seizure-like activity versus posturing. He was given 2 mg of Ativan by Intensivist due to left eye gaze deviation and jerking movements and rigidity.      Past Medical History: Diagnosis Date    Anemia     Coronary artery disease involving native coronary artery of native heart without angina pectoris 2021    Cath from [address] [place] [address] 17 for abnormal stress test LM ok LAD  prox with benny from RCA and distal LAD occluded D1 prox 70 D2 prox 90 Circ pMLI OM1 50%  RCA large, mid 36    Depression     Diabetes ([facility] [address]     Dysphagia     GERD (gastroesophageal reflux disease)     Hyperlipidemia     Hypertension     Nausea & vomiting     Stroke ([facility] [address] 2003     No family history on file. Social History     Tobacco Use    Smoking status: Never Smoker    Smokeless tobacco: Not on file   Substance Use Topics    Alcohol use: No      Prior to Admission Medications   Prescriptions Last Dose Informant Patient Reported? Taking? Combigan 0.2-0.5 % drop ophthalmic solution   Yes Yes   Sig: Administer 1 Drop to left eye every twelve (12) hours. Glaucoma   Doxepin 3 mg tab   Yes Yes   Sig: Take 3 mg by mouth nightly. Glutose-15 40 % oral gel   Yes No   acetaminophen (TylenoL) 325 mg tablet   Yes Yes   Sig: Take 650 mg by mouth every four (4) hours as needed for Pain. ascorbic acid, vitamin C, (Vitamin C) 500 mg tablet Unknown at Unknown time  Yes No   Sig: Take 500 mg by mouth two (2) times a day. aspirin 81 mg tablet   Yes Yes   Sig: Take 81 mg by mouth daily. atorvastatin (LIPITOR) 40 mg tablet   Yes Yes   Sig: Take 40 mg by mouth nightly. carvediloL (COREG) 6.25 mg tablet   No Yes   Sig: Take 1 Tablet by mouth two (2) times daily (with meals). chlorproMAZINE (THORAZINE) 50 mg tablet   Yes Yes   Si mg three (3) times daily. clopidogreL (PLAVIX) 75 mg tab 2022 at Unknown time  Yes Yes   Sig: Take 75 mg by mouth daily. ergocalciferol (Vitamin D2) 1,250 mcg (50,000 unit) capsule   Yes Yes   Sig: Take 50,000 Units by mouth every seven (7) days. EVERY Monday. gabapentin (NEURONTIN) 100 mg capsule   Yes Yes   Si mg nightly.    hydrALAZINE (APRESOLINE) 25 mg tablet   Yes Yes   Sig: Take 25 mg by mouth four (4) days a week. Receives 25mg in morning and 25mg in evening on Mon/Wed/Fri/Sun   hydrALAZINE (APRESOLINE) 25 mg tablet   Yes Yes   Sig: Take 25 mg by mouth four (4) days a week. Receives 25mg in morning and 25mg in evening on Mon/Wed/Fri/Sun   insulin lispro (HUMALOG) 100 unit/mL injection   Yes Yes   Sig: by SubCUTAneous route Before breakfast, lunch, dinner and at bedtime. Inject per sliding scale, 0-200= 0; 201-250= 2 units; 251-300= 4 units; 301-350= 6 units; 351-400= 8 units; 401-999= 10 units. Greater than or equal to 401, give 10 units and CALL MD/NP, subcutaneously before meals and at bedtime for DM.   isosorbide dinitrate (ISORDIL) 10 mg tablet   No Yes   Sig: Take 1 Tablet by mouth three (3) times daily. latanoprost (XALATAN) 0.005 % ophthalmic solution   Yes Yes   Sig: Administer 1 Drop to left eye nightly. Unspecified Glaucoma   multivitamin (ONE A DAY) tablet   Yes Yes   Sig: Take 1 Tablet by mouth daily. omeprazole (PRILOSEC) 40 mg capsule   Yes Yes   Sig: Take 40 mg by mouth daily. senna (Senna) 8.6 mg tablet   Yes Yes   Sig: Take 1 Tablet by mouth nightly. sertraline (ZOLOFT) 50 mg tablet   Yes Yes   Sig: Take 50 mg by mouth daily. sevelamer carbonate (Renvela) 0.8 gram pwpk oral powder   Yes Yes   Sig: Take 0.8 g by mouth three (3) times daily (with meals). sodium chloride (OCEAN) 0.65 % nasal squeeze bottle   Yes Yes   Si [place] by Both Nostrils route three (3) times daily as needed for Congestion. tamsulosin (FLOMAX) 0.4 mg capsule   Yes Yes   Sig: Take 0.4 mg by mouth nightly. zinc sulfate (ZINCATE) 50 mg zinc (220 mg) capsule   Yes Yes   Sig: Take 1 Capsule by mouth daily.       Facility-Administered Medications: None       Allergies   Allergen Reactions    Adhesive Tape-Silicones Rash    Trazodone Unknown (comments)     Listed in transfer paper 22       Review of Systems:  Review of systems not obtained due to patient factors. Unresponsiveness. Objective:     Vitals:    01/15/22 1900 01/15/22 2000 01/15/22 2100 01/15/22 2200   BP: (!) 145/70 127/67 (!) 121/58 (!) 141/71   Pulse: 88 80 82 81   Resp: 22 25 22 24   Temp:  98.7 °F (37.1 °C)     TempSrc:       SpO2: 100% 100% 98% 100%   Weight:       Height:          Physical Exam:  GENERAL: Ill appearing male, intubated and sedated with Precedex. SKIN: Warm, dry, color appropriate for ethnicity. EXTREMITIES: Bilateral lower extremity BKAs. Neurologic Exam:  Mental Status:  No eye opening, no command following. Language:    [place], ETT in place. Cranial Nerves:   Left pupil 2 mm round reactive to light, right pupil approximately 3 mm irregularly shaped and appears fixed. Blink to threat in the left eye, no blink to threat on the right. Right eye esotropia with gaze inward and down. (Most likely chronic blindness as CTH shows right eye phthisis bulbi). Left eye slightly lateral gaze. No facial asymmetry noted around ETT. Motor:    Left arm and hand contracted and rigid. Occasional spontaneous involuntary movements noted throughout. Sensation:    No withdrawal to noxious stimuli but does move spontaneously  Reflexes:    Positive corneal reflex in left eye, none in right eye.        Labs:  Lab Results   Component Value Date/Time    WBC 9.0 01/15/2022 04:47 AM    HGB 10.0 (L) 01/15/2022 04:47 AM    HCT 32.7 (L) 01/15/2022 04:47 AM    PLATELET 652 71/03/4661 04:47 AM    MCV 95.3 01/15/2022 04:47 AM      Lab Results   Component Value Date/Time    Sodium 137 01/15/2022 04:47 AM    Potassium 3.8 01/15/2022 04:47 AM    Chloride 100 01/15/2022 04:47 AM    CO2 28 01/15/2022 04:47 AM    Anion gap 9 01/15/2022 04:47 AM    Glucose 269 (H) 01/15/2022 04:47 AM    BUN 52 (H) 01/15/2022 04:47 AM    Creatinine 7.13 (H) 01/15/2022 04:47 AM    BUN/Creatinine ratio 7 (L) 01/15/2022 04:47 AM    GFR est AA 9 (L) 01/15/2022 04:47 AM    GFR est non-AA 8 (L) 01/15/2022 04:47 AM    Calcium 9.9 01/15/2022 04:47 AM     No results found for: CPK, RCK1, RCK2, RCK3, RCK4, CKMB, CKNDX, CKND1, TROPT, TROIQ, BNPP, BNP    Imaging:  CT Results (maximum last 3): Results from [place] encounter on 01/08/22    CT HEAD WO CONT    Narrative  INDICATION: AMS    EXAM:  HEAD CT WITHOUT CONTRAST    COMPARISON: January 8, 2022    TECHNIQUE:  Routine noncontrast axial head CT was performed. Sagittal and  coronal reconstructions were generated. CT dose reduction was achieved through use of a standardized protocol tailored  for this examination and automatic exposure control for dose modulation. FINDINGS:    Ventricles: Midline, no hydrocephalus. Intracranial Hemorrhage: None. Brain Parenchyma/Brainstem: Chronic right MCA territory infarction. Chronic  right inferior cerebellar infarction. Generalized atrophy. Basal Cisterns: Normal.  Paranasal Sinuses: Visualized sinuses are clear. Additional Comments: Intravascular contrast. Right phthisis bulbi. Impression  No acute process. CTA CODE NEURO HEAD AND NECK W CONT    Addendum 1/8/2022  4:04 AM  Addendum: 69% stenosis at the origin of the right internal carotid artery    Narrative  EXAM: CTA CODE NEURO HEAD AND NECK W CONT, CT CODE NEURO PERF W CBF    INDICATION: unresponsive and s/p fall    COMPARISON: None. CONTRAST: 100 mL of Isovue-370. TECHNIQUE:  Unenhanced  images were obtained to localize the volume for  acquisition. Multislice helical axial CT angiography was performed from the  aortic arch to the top of the head during uneventful rapid bolus intravenous  contrast administration. Coronal and sagittal reformations and 3D post  processing was performed. CT dose reduction was achieved through use of a  standardized protocol tailored for this examination and automatic exposure  control for dose modulation. This study was analyzed by the [address] ai algorithm.     CT brain perfusion was performed with generation of hemodynamic maps of multiple  parameters, including cerebral blood flow, cerebral blood volume, and MTT (mean  transit time). CT dose reduction was achieved through use of a standardized  protocol tailored for this examination and automatic exposure control for dose  modulation. FINDINGS:    CTA NECK  There is conventional three vessel arch anatomy. Calcified plaque and stenosis  is noted at the origin of the right internal carotid artery. % of right carotid artery stenosis: 69%  % of left carotid artery stenosis: No significant stenosis    NASCET method was utilized for calculating stenosis. The proximal right vertebral artery is very small in caliber and then not  visualized in the mid neck. There is some mild reconstitution distally. The  cervical portion of the left vertebral artery is patent. Spiculated scarring in  the bilateral lung apices. Small calcified right globe. There are degenerative  changes of the cervical spine. CTA HEAD  Left jugular is dominant. The distal right vertebral artery is occluded. There  is severe stenosis in the distal left vertebral artery. . There is diffuse  multifocal stenosis in the basilar artery which is already small in caliber. Fetal origin the bilateral posterior cerebral arteries. . Moderate  atherosclerotic disease is present in the cavernous portion of the bilateral  internal carotid arteries. . . There is no aneurysm. Chronic small vessel  ischemic disease. CT PERFUSION:    There is a regional area of a elevated Tmax involving the brainstem. rCBF < 30% = 0 cc. Tmax > 6 seconds = 20 cc. Impression  1. Extensive vertebrobasilar disease. Occluded cervical right vertebral artery  with some mild distal reconstitution and then distal occlusion at the V4  segment. There is a dominant left vertebral artery with severe stenosis of the  V4 segment of the left vertebral artery.  The basilar artery is small caliber  with diffuse stenosis. 2.  Corresponding perfusion abnormalities noted in the brainstem. 3.  Chronic small vessel ischemic disease. .    4.  Findings were discussed with Dr. Dionna Chaparro at the time of dictation      CT CODE NEURO PERF W CBF    Addendum 1/8/2022  4:04 AM  Addendum: 69% stenosis at the origin of the right internal carotid artery    Narrative  EXAM: CTA CODE NEURO HEAD AND NECK W CONT, CT CODE NEURO PERF W CBF    INDICATION: unresponsive and s/p fall    COMPARISON: None. CONTRAST: 100 mL of Isovue-370. TECHNIQUE:  Unenhanced  images were obtained to localize the volume for  acquisition. Multislice helical axial CT angiography was performed from the  aortic arch to the top of the head during uneventful rapid bolus intravenous  contrast administration. Coronal and sagittal reformations and 3D post  processing was performed. CT dose reduction was achieved through use of a  standardized protocol tailored for this examination and automatic exposure  control for dose modulation. This study was analyzed by the [address] ai algorithm. CT brain perfusion was performed with generation of hemodynamic maps of multiple  parameters, including cerebral blood flow, cerebral blood volume, and MTT (mean  transit time). CT dose reduction was achieved through use of a standardized  protocol tailored for this examination and automatic exposure control for dose  modulation. FINDINGS:    CTA NECK  There is conventional three vessel arch anatomy. Calcified plaque and stenosis  is noted at the origin of the right internal carotid artery. % of right carotid artery stenosis: 69%  % of left carotid artery stenosis: No significant stenosis    NASCET method was utilized for calculating stenosis. The proximal right vertebral artery is very small in caliber and then not  visualized in the mid neck. There is some mild reconstitution distally. The  cervical portion of the left vertebral artery is patent.  Spiculated scarring in  the bilateral lung apices. Small calcified right globe. There are degenerative  changes of the cervical spine. CTA HEAD  Left jugular is dominant. The distal right vertebral artery is occluded. There  is severe stenosis in the distal left vertebral artery. . There is diffuse  multifocal stenosis in the basilar artery which is already small in caliber. Fetal origin the bilateral posterior cerebral arteries. . Moderate  atherosclerotic disease is present in the cavernous portion of the bilateral  internal carotid arteries. . . There is no aneurysm. Chronic small vessel  ischemic disease. CT PERFUSION:    There is a regional area of a elevated Tmax involving the brainstem. rCBF < 30% = 0 cc. Tmax > 6 seconds = 20 cc. Impression  1. Extensive vertebrobasilar disease. Occluded cervical right vertebral artery  with some mild distal reconstitution and then distal occlusion at the V4  segment. There is a dominant left vertebral artery with severe stenosis of the  V4 segment of the left vertebral artery. The basilar artery is small caliber  with diffuse stenosis. 2.  Corresponding perfusion abnormalities noted in the brainstem. 3.  Chronic small vessel ischemic disease. .    4.  Findings were discussed with Dr. Otilia Flores at the time of dictation    Assessment:     Hospital Problems  Never Reviewed          Codes Class Noted POA    Respiratory failure [facility]) ICD-10-CM: J96.90  ICD-9-CM: 518.81  11/16/2021 Unknown            Plan:   Continued unresponsiveness with involuntary movements and gaze deviation. Some improvement noted in involuntary movement and gaze deviation after IV Ativan given. Concern for seizure versus posturing in critically ill, encephalopathic patient with COVID-19, respiratory failure, heart failure, and CKD. -Patient with extensive vertebrobasilar disease on CTA H/N on aspirin, Plavix and Lipitor.   -No acute stroke noted on MRI completed 01/11/22.  There was atrophy, extensive chronic white matter disease, and chronic infarction in the right corona radiata, deep right frontal lobe and right cerebellum. There was associated Wallerian degeneration and probable pontine chronic infarction. Small chronic lacunar infarctions in the inferior left cerebellum. Several chronic micro-hemorrhages in the left temporal lobe and left frontal lobe. No acute hemorrhage. Right phthisis bulbi. - EEG on 01/10/22 showed no clearly lateralizing or epileptiform features. There was a mild generalized encephalopathic process. -EEG repeated on 01/14/22 showed severe generalized encephalopathic process. May be related to underlying structural brain injury and or toxic metabolic abnormality. No clearly lateralizing or epileptiform features seen. - ECHO with EF of 15-20%. Severe global hypokinesis. -Frequent neuro checks per unit protocol  -CTH repeat on 01/14/21 showed no acute process. Chronic right corona radiata infarction and right  sided Wallerian degeneration. Chronic right cerebellar infarction. White matter disease.   -Supportive care by Intensivist and Nephrologist      I have discussed the diagnosis and the intended plan with Intensivist. Further recommendations to follow from [facility]    Thank you for this consult and participating in the care of this patient. Signed By: Noe Michaud NP     January 16, 2022      Neurology staff:    I examined the patient at the bedside. I agree with the plans and documentation above from MARIANA Vieira. Mr. Stacy Lux is a 79-year-old gentleman who is a new patient for me. He was admitted on January 8 with acute respiratory failure from his nursing home after a fall from his bed. He was intubated. He is COVID-positive. Neurologic assessment showing vertebral artery occlusion. He is on dual antiplatelets. Neurology reconsulted because of some question of subclinical seizure activity returning. Gaze deviation noted. Head CT yesterday but old disease.   MRI done just prior showing again old ischemic disease throughout worse on the right with some cortical involvement. On exam, he is undergoing an SBT. No sedation. He does not follow commands for me. Eyes are deviated down which apparently is baseline for him. Pupils appear equal reactive. He withdraws and localizes slightly with the left arm. Nothing on the right. Bilateral BKA noted. 69-year-old gentleman who remained poorly responsive. No new ischemic insult on imaging done January 11. He does have extensive ischemic disease with some slight cortical involvement. He should be on anticonvulsants as a precaution. I have placed him on lacosamide with a load and then maintenance dosing. Continue with the current supportive care and extubate if possible. We will obtain EEG  To compare to previous which only showed slowing.   Following  [address], DO  Neurologist  Diplomate, American Board of Psychiatry and Neurology  Board Certified, Adult Neurology and Brain Injury Medicine

## 2022-01-16 NOTE — PROGRESS NOTES
745-Bedside and Verbal shift change report given to Radha Makrs (oncoming nurse) by Kelly Freeman (offgoing nurse). Report included the following information SBAR, Kardex, ED Summary, Procedure Summary, Intake/Output, MAR, Accordion, Recent Results, Med Rec Status, Cardiac Rhythm SR, Alarm Parameters , Quality Measures and Dual Neuro Assessment. 0830- Dr. Gagan Borden at bedside, precedex gtt stopped, pt placed on SBT. 1230- Pt TV in low 100's. RT and Gagan Borden notified, placed back on a rate. 1930-Bedside and Written shift change report given to Kelly Freeman (oncoming nurse) by Radha Marks (offgoing nurse). Report included the following information SBAR, Kardex, ED Summary, Procedure Summary, Intake/Output, MAR, Accordion, Recent Results, Med Rec Status, Cardiac Rhythm SR, Alarm Parameters , Quality Measures and Dual Neuro Assessment.

## 2022-01-17 NOTE — PROGRESS NOTES
730-Bedside and Verbal shift change report [facility] by Tolu Morfin (offgoing nurse). Report included the following information SBAR, Kardex, ED Summary, Procedure Summary, Intake/Output, MAR, Accordion, Recent Results, Med Rec Status, Cardiac Rhythm ST, Procedure Verification, Quality Measures and Dual Neuro Assessment. 0900- Spoke with pt's sister, would like to hold on trach and peg consult until speaking with palliative about goals of care. [place] of 's-170's. Will continue to monitor. 1930-Bedside and Verbal shift change report given to Retta Gottron (oncoming nurse) by [facility] (offgoing nurse). Report included the following information SBAR, Kardex, ED Summary, Procedure Summary, Intake/Output, MAR, Accordion, Recent Results, Med Rec Status, Cardiac Rhythm ST, Alarm Parameters , Procedure Verification, Quality Measures and Dual Neuro Assessment.

## 2022-01-17 NOTE — PROGRESS NOTES
01/17/22 0916   Weaning Parameters   Spontaneous Breathing Trial Complete No (Comments)   Resp Rate Observed 40   Ve 8.6      RSBI 204   Pressure Support/SBT Results  Returned to previous vent settings at this time. RN at bedside.

## 2022-01-17 NOTE — PROGRESS NOTES
Neurology Progress Note  Ricardo Grajeda NP    Admit Date: 1/8/2022   LOS: 9 days      Daily Progress Note: 1/17/2022      HPI: Jaime Christianson is a 78 y.o. M with a past medical history of CVA, HTN, hyperlipidemia, GERD, dysphagia, DM, CAD, anemia, GERD, depression, bilateral BKA, and CKD on dialysis who presented to the ER at [facility] via EMS in the early am on 01/08/22 after being found on the floor at [facility] after falling out of bed at approximately 0100. He was unresponsive. He was known to be positive with COVID-19. A Code Stroke was called on his arrival to the ER. [facility] showed no acute abnormality. CTA H/N showed extensive vertebrobasilar disease with an occluded cervical right vertebral artery with some mild distal reconstitution and distal occlusion at the V4 segment. There was a dominant left VA with severe stenosis of the V4 segment on the left side. The basilar artery was with diffuse stenosis. Corresponding perfusion abnormalities were noted in the brainstem. No LVO. He was not a candidate for tPA or for mechanical thrombectomy. Patient was intubated in ER to protect his airway. According to record he had been on aspirin 81 mg q day, Plavix 75 mg po q day and Lipitor 40 mg po q day. He was loaded with aspirin and Plavix in the ER and he has continued on aspirin, Plavix and Lipitor during his admission. There was concern for possible subclinical seizure due to his unresponsiveness, so EEG was completed on 01/10/22 which showed no seizure activity. There was a mild generalized encephalopathic process.      Neurology was re-consulted due to patient remaining unresponsive and now having some possible seizure-like activity versus posturing. He was given 2 mg of Ativan by Intensivist due to left eye gaze deviation and jerking movements and rigidity. Subjective:     No acute neurological events overnight. Pt remains intubated and off sedation since morning of 1/16. Unsuccessful SBT morning of 1/16. Palliative Care was consulted yesterday. Allergies   Allergen Reactions    Adhesive Tape-Silicones Rash    Trazodone Unknown (comments)     Listed in transfer paper 22       Review of Systems:  Review of systems not obtained due to patient factors. Past Medical History:   Diagnosis Date    Anemia     Coronary artery disease involving native coronary artery of native heart without angina pectoris 2021    Cath from [address] [place] [address] 17 for abnormal stress test LM ok LAD  prox with benny from RCA and distal LAD occluded D1 prox 70 D2 prox 90 Circ pMLI OM1 50%  RCA large, mid 36    Depression     Diabetes ([facility] [address]     Dysphagia     GERD (gastroesophageal reflux disease)     Hyperlipidemia     Hypertension     Nausea & vomiting     Stroke ([facility] [address] 2003     No family history on file. Social History     Tobacco Use    Smoking status: Never Smoker    Smokeless tobacco: Not on file   Substance Use Topics    Alcohol use: No      Prior to Admission Medications   Prescriptions Last Dose Informant Patient Reported? Taking? Combigan 0.2-0.5 % drop ophthalmic solution   Yes Yes   Sig: Administer 1 Drop to left eye every twelve (12) hours. Glaucoma   Doxepin 3 mg tab   Yes Yes   Sig: Take 3 mg by mouth nightly. Glutose-15 40 % oral gel   Yes No   acetaminophen (TylenoL) 325 mg tablet   Yes Yes   Sig: Take 650 mg by mouth every four (4) hours as needed for Pain. ascorbic acid, vitamin C, (Vitamin C) 500 mg tablet Unknown at Unknown time  Yes No   Sig: Take 500 mg by mouth two (2) times a day. aspirin 81 mg tablet   Yes Yes   Sig: Take 81 mg by mouth daily. atorvastatin (LIPITOR) 40 mg tablet   Yes Yes   Sig: Take 40 mg by mouth nightly. carvediloL (COREG) 6.25 mg tablet   No Yes   Sig: Take 1 Tablet by mouth two (2) times daily (with meals). chlorproMAZINE (THORAZINE) 50 mg tablet   Yes Yes   Si mg three (3) times daily.    clopidogreL (PLAVIX) 75 mg tab 2022 at Unknown time  Yes Yes   Sig: Take 75 mg by mouth daily. ergocalciferol (Vitamin D2) 1,250 mcg (50,000 unit) capsule   Yes Yes   Sig: Take 50,000 Units by mouth every seven (7) days. EVERY Monday. gabapentin (NEURONTIN) 100 mg capsule   Yes Yes   Si mg nightly. hydrALAZINE (APRESOLINE) 25 mg tablet   Yes Yes   Sig: Take 25 mg by mouth four (4) days a week. Receives 25mg in morning and 25mg in evening on /Fri/Sun   hydrALAZINE (APRESOLINE) 25 mg tablet   Yes Yes   Sig: Take 25 mg by mouth four (4) days a week. Receives 25mg in morning and 25mg in evening on /Fri/Sun   insulin lispro (HUMALOG) 100 unit/mL injection   Yes Yes   Sig: by SubCUTAneous route Before breakfast, lunch, dinner and at bedtime. Inject per sliding scale, 0-200= 0; 201-250= 2 units; 251-300= 4 units; 301-350= 6 units; 351-400= 8 units; 401-999= 10 units. Greater than or equal to 401, give 10 units and CALL MD/NP, subcutaneously before meals and at bedtime for DM.   isosorbide dinitrate (ISORDIL) 10 mg tablet   No Yes   Sig: Take 1 Tablet by mouth three (3) times daily. latanoprost (XALATAN) 0.005 % ophthalmic solution   Yes Yes   Sig: Administer 1 Drop to left eye nightly. Unspecified Glaucoma   multivitamin (ONE A DAY) tablet   Yes Yes   Sig: Take 1 Tablet by mouth daily. omeprazole (PRILOSEC) 40 mg capsule   Yes Yes   Sig: Take 40 mg by mouth daily. senna (Senna) 8.6 mg tablet   Yes Yes   Sig: Take 1 Tablet by mouth nightly. sertraline (ZOLOFT) 50 mg tablet   Yes Yes   Sig: Take 50 mg by mouth daily. sevelamer carbonate (Renvela) 0.8 gram pwpk oral powder   Yes Yes   Sig: Take 0.8 g by mouth three (3) times daily (with meals). sodium chloride (OCEAN) 0.65 % nasal squeeze bottle   Yes Yes   [place] by Both Nostrils route three (3) times daily as needed for Congestion. tamsulosin (FLOMAX) 0.4 mg capsule   Yes Yes   Sig: Take 0.4 mg by mouth nightly.    zinc sulfate (ZINCATE) 50 mg zinc (220 mg) capsule   Yes Yes   Sig: Take 1 Capsule by mouth daily. Facility-Administered Medications: None       Objective:   Vital signs  Temp (24hrs), Av.3 °F (36.8 °C), Min:96.9 °F (36.1 °C), Max:99.2 °F (37.3 °C)   1901 -  0700  In: 325   Out: -   01/15 0701 -  190  In: 1581.2 [I.V.:491.2]  Out: 1100   Visit Vitals  BP (!) 150/93   Pulse (!) 106   Temp 97.9 °F (36.6 °C)   Resp 23   Ht 6' 3\" (1.905 m)   Wt 69.1 kg (152 lb 5.4 oz)   SpO2 98%   BMI 19.04 kg/m²      O2 Device: Endotracheal tube   Vitals:    22 2300 22 0000 22 0012 22 0100   BP: (!) 156/95 (!) 152/84  (!) 150/93   Pulse: (!) 110 (!) 111 (!) 106 (!) 106   Resp:    Temp:  97.9 °F (36.6 °C)     TempSrc:       SpO2: 97% 99% 100% 98%   Weight:       Height:            Physical Exam:  GENERAL: Ill-appearing male, intbuated, no sedation. Not in distress. SKIN: Warm, dry, color appropriate for ethnicity. Neurologic Exam:  Mental Status:  No eye opening, no command following. Language:    [place], ETT in place. .    Cranial Nerves:   Pupils 3 mm, equal, round and reactive to light. Blinks to threat in both eyes. Right eye is midline. Left eye is deviated downward. Resists attempts to open eyes. No facial asymmetry noted, difficult to assess with ETT. Motor:    Left hand contracted. No spontaneous movements observed. Bilateral AKAs. No withdrawal to noxious stimuli in upper extremities but grimacing noted with noxious stimuli to left arm/hand. Small spontaneous movements observed in BLE. Resists attempts at eye opening. No involuntary movements. Sensation:    No withdrawal to noxious stimuli.   Reflexes:    Negative Babinskis      Labs:  Lab Results   Component Value Date/Time    WBC 7.6 2022 04:26 AM    HGB 10.3 (L) 2022 04:26 AM    HCT 33.1 (L) 2022 04:26 AM    PLATELET 083  04:26 AM    MCV 95.7 2022 04:26 AM     Lab Results   Component Value Date/Time Sodium 137 01/16/2022 04:26 AM    Potassium 3.8 01/16/2022 04:26 AM    Chloride 97 01/16/2022 04:26 AM    CO2 31 01/16/2022 04:26 AM    Anion gap 9 01/16/2022 04:26 AM    Glucose 228 (H) 01/16/2022 04:26 AM    BUN 33 (H) 01/16/2022 04:26 AM    Creatinine 5.06 (H) 01/16/2022 04:26 AM    BUN/Creatinine ratio 7 (L) 01/16/2022 04:26 AM    GFR est AA 14 (L) 01/16/2022 04:26 AM    GFR est non-AA 11 (L) 01/16/2022 04:26 AM    Calcium 10.1 01/16/2022 04:26 AM     Imaging:  CT Results (maximum last 3): Results from [place] encounter on 01/08/22    CT HEAD WO CONT    FINDINGS:    Ventricles: Midline, no hydrocephalus. Intracranial Hemorrhage: None. Brain Parenchyma/Brainstem: Chronic right corona radiata infarction and right  sided Wallerian degeneration. Patchy periventricular white matter hypodensities. Chronic right cerebellar infarction. Basal Cisterns: Normal.  Paranasal Sinuses: Visualized sinuses are clear. Additional Comments: Right phthisis bulbi. Impression  No acute process. Chronic infarctions and white matter disease as above. CTA CODE NEURO HEAD AND NECK W CONT    Addendum 1/8/2022  4:04 AM  Addendum: 69% stenosis at the origin of the right internal carotid artery    Narrative  EXAM: CTA CODE NEURO HEAD AND NECK W CONT, CT CODE NEURO PERF W CBF    INDICATION: unresponsive and s/p fall    COMPARISON: None. CONTRAST: 100 mL of Isovue-370. TECHNIQUE:  Unenhanced  images were obtained to localize the volume for  acquisition. Multislice helical axial CT angiography was performed from the  aortic arch to the top of the head during uneventful rapid bolus intravenous  contrast administration. Coronal and sagittal reformations and 3D post  processing was performed. CT dose reduction was achieved through use of a  standardized protocol tailored for this examination and automatic exposure  control for dose modulation. This study was analyzed by the [address] ai algorithm.     CT brain perfusion was performed with generation of hemodynamic maps of multiple  parameters, including cerebral blood flow, cerebral blood volume, and MTT (mean  transit time). CT dose reduction was achieved through use of a standardized  protocol tailored for this examination and automatic exposure control for dose  modulation. FINDINGS:    CTA NECK  There is conventional three vessel arch anatomy. Calcified plaque and stenosis  is noted at the origin of the right internal carotid artery. % of right carotid artery stenosis: 69%  % of left carotid artery stenosis: No significant stenosis    NASCET method was utilized for calculating stenosis. The proximal right vertebral artery is very small in caliber and then not  visualized in the mid neck. There is some mild reconstitution distally. The  cervical portion of the left vertebral artery is patent. Spiculated scarring in  the bilateral lung apices. Small calcified right globe. There are degenerative  changes of the cervical spine. CTA HEAD  Left jugular is dominant. The distal right vertebral artery is occluded. There  is severe stenosis in the distal left vertebral artery. . There is diffuse  multifocal stenosis in the basilar artery which is already small in caliber. Fetal origin the bilateral posterior cerebral arteries. . Moderate  atherosclerotic disease is present in the cavernous portion of the bilateral  internal carotid arteries. . . There is no aneurysm. Chronic small vessel  ischemic disease. CT PERFUSION:    There is a regional area of a elevated Tmax involving the brainstem. rCBF < 30% = 0 cc. Tmax > 6 seconds = 20 cc. Impression  1. Extensive vertebrobasilar disease. Occluded cervical right vertebral artery  with some mild distal reconstitution and then distal occlusion at the V4  segment. There is a dominant left vertebral artery with severe stenosis of the  V4 segment of the left vertebral artery.  The basilar artery is small caliber  with diffuse stenosis. 2.  Corresponding perfusion abnormalities noted in the brainstem. 3.  Chronic small vessel ischemic disease. .    4.  Findings were discussed with Dr. Bonnie Archibald at the time of dictation    Assessment:   Active Problems:    Respiratory failure ([address] (11/16/2021)      Plan:     75-yo M admitted January 8 from Nursing home after fall from bed. Covid +. Patient with extensive vertebrobasilar disease on CTA H/N on aspirin, Plavix and Lipitor. No new ischemic insult on MRI brain done 1/11/22.  CT of head done 1/14 shows old infarcts. EEGs done 1/10 and 1/14 show encephalopathic process. Loaded with Vimpat and then placed on Vimpat q12. Altered Mental Status 2/2 Seizures versus Metabolic Encephalopathy     -Repeat EEG 1/17 and compare to priors done 1/10 and 1/14   -Continue AED regimen of Vimpat 100 mg q12 hours   -Supportive care by Intensivist and Nephrologist     - Ativan for seizure activity lasting longer than 5 minutes   - Avoid fluoroquinolones and 4th generation cephalosporins as can lower seizure threshold   - Seizure precautions      Further recommendations to follow from Dr. Socorro Apley. Izabella Narayanan, NP  Neurocritical Care Nurse Practitioner    Neurology staff:     I examined the patient at the bedside. I agree with the plans and documentation above from NP [facility]. Mr. Isabelle Reyes is a 50-year-old gentleman admitted on January 8 with acute respiratory failure from his nursing home after a fall from his bed. He was intubated. He is COVID-positive. He remains poorly responsive with no improvement from yesterday. On exam, there in no sedation. He does not follow commands for me. Eyes are deviated down which apparently is baseline for him but show movement to light exam.  Pupils appear equal reactive. He grimaces to pain BUE. Bilateral BKA noted. 50-year-old gentleman who remained poorly responsive. No new ischemic insult on imaging done January 11.   He does have extensive ischemic disease with some slight cortical involvement. Stay on lacosamide, EEG today.    Following  Shorty Leiva DO  Neurologist  Diplomate, American Board of Psychiatry and Neurology  Board Certified, Adult Neurology and Brain Injury Medicine

## 2022-01-17 NOTE — CONSULTS
Palliative Medicine Consult  Trino: [id number] ([id number])    Patient Name: Satish Honeycutt  YOB: 1955    Date of Initial Consult: 1/17/22  Reason for Consult: Care decisions   Requesting Provider: Alexis Aguilar  Primary Care Physician: Gerard Cifuentes MD     SUMMARY:   Satish Honeycutt is a 78 y.o. with a past history of ESRD on HD for several years, NSTEMI 11/2021 w/ acute CHF (EF 20-25%), BKA, CVA, DM who was admitted on 1/8/2022 from [facility] after falling out of bed, then later on being found unresponsive. Head CT w/out cute findings- CTA showing extensive verterbrobasilar disease and occluded R vertebral artery. Intubated in ED for airway protection. Neuro consulted, as CTA findings not explaining pt being unresponsive. MRI brain w/ extensive old ischemic disease. EEG 1/10/22 did not show seizures, but later on having possible seizure activity. Repeat EEG 1/14 also w/out seizures but suggestive of severe generalized encephalopathic process. Also w/ COVID 19 and now w/ EF of 15-20%. Off sedation since 1/16 and not following any commands, will withdraw to pain. Current medical issues leading to Palliative Medicine involvement include: care decisions. Social: Hx obtained from sister Shawn. Pt  x 30 years, but still legally . Wife has not been part of pt's life since separation. Pt has 2 sons- they have been out of contact for a few years. Pt has 7 living siblings incl sisters Shawn and Hong Norton who have been points of contact this admission. Pt w/out AMD on file and family does not know if he has one. PALLIATIVE DIAGNOSES:   1. Unresponsive w/ hx of CVA w/ MRI brain showing atrophy and extensive chronic white matter disease   2. Decline in health over past few years and catrachita over past 6 months  3. ESRD on HD  4. Goals of care       PLAN:   1. Pt unresponsive to voice, not following any commands. Failed SBTs, cannot protect airway.  Have r/o seizures and acute CVA for presentation- pt with acute encephalopathy likely due to very little reserve w/ severe chronic changes in MRI brain and mult serious health conditions w/ mult admissions/ED visits (to all hospitals in the area) over the past 6 months. 2. Speak to sister Rajiv Blas and she was contacted earlier today by RN- has good understanding of situation and severity of his illness. Uncertain if he would want trach. PEG. Discuss options being that [facility] comfort and hospice. Regardless recommend DNR status. 3. She understands that there is no AMD that we know of and his wife () and two sons are legal NOK. She will try and reach them. 4. Initial consult note routed to primary continuity provider and/or primary health care team members  5. Communicated plan of care with: Palliative [facility] DOMING[facility] Team     GOALS OF CARE / TREATMENT PREFERENCES:     GOALS OF CARE:  Patient/Health Care Proxy Stated Goals: Other (comment) (TBD)    TREATMENT PREFERENCES:   Code Status: Full Code        Advance Care Planning:  [x] [facility] Interdisciplinary Team has updated the ACP Navigator with Health Care Decision Maker and Patient Capacity      Primary Decision Maker: Kelli Freeman - [phone]  Advance Care Planning 11/16/2021   Patient's Healthcare Decision Maker is: Legal Next of Kin   Confirm Advance Directive None   Patient Would Like to Complete Advance Directive Unable       Medical Interventions: Full interventions       Other:    As far as possible, the palliative care team has discussed with patient / health care proxy about goals of care / treatment preferences for patient. HISTORY:     History obtained from: chart, staff, family     CHIEF COMPLAINT: Cannot obtain due to patient factors      HPI/SUBJECTIVE:    The patient is:   [] Verbal and participatory  [x] Non-participatory due to:    Unresponsive on vent     Clinical Pain Assessment (nonverbal scale for severity on nonverbal patients):   Clinical Pain Assessment  Severity: 0     Activity (Movement): Lying quietly, normal position    Duration: for how long has pt been experiencing pain (e.g., 2 days, 1 month, years)  Frequency: how often pain is an issue (e.g., several times per day, once every few days, constant)     FUNCTIONAL ASSESSMENT:     Palliative Performance Scale (PPS):  PPS: 10       PSYCHOSOCIAL/SPIRITUAL SCREENING:     Palliative IDT has assessed this patient for cultural preferences / practices and a referral made as appropriate to needs (Cultural Services, Patient Advocacy, Ethics, etc.)    Any spiritual / Orthodox concerns:  [] Yes /  [x] No   If \"Yes\" to discuss with pastoral care during IDT     Does caregiver feel burdened by caring for their loved one:   [] Yes /  [x] No /  [] No Caregiver Present    If \"Yes\" to discuss with social work during IDT    Anticipatory grief assessment:   [x] Normal  / [] Maladaptive     If \"Maladaptive\" to discuss with social work during IDT    ESAS Anxiety:      ESAS Depression:       Cannot obtain due to patient factors     REVIEW OF SYSTEMS:     Positive and pertinent negative findings in ROS are noted above in HPI. The following systems were [x] reviewed / [] unable to be reviewed as noted in HPI  Other findings are noted below. Systems: constitutional, ears/nose/mouth/throat, respiratory, gastrointestinal, genitourinary, musculoskeletal, integumentary, neurologic, psychiatric, endocrine. Positive findings noted below. Modified ESAS Completed by: provider   Fatigue: 10 Drowsiness: 10     Pain: 0           Dyspnea: 0           Stool Occurrence(s): 1        PHYSICAL EXAM:     From RN flowsheet:  Wt Readings from Last 3 Encounters:   01/17/22 155 lb 10.3 oz (70.6 kg)   12/02/21 176 lb 5.9 oz (80 kg)   11/21/21 160 lb 7.9 oz (72.8 kg)     Blood pressure (!) 162/93, pulse (!) 101, temperature 98.6 °F (37 °C), resp.  rate 20, height 6' 3\" (1.905 m), weight 155 lb 10.3 oz (70.6 kg), SpO2 97 %. Pain Scale 1: Adult Nonverbal Pain Scale  Pain Intensity 1: 0                 Last bowel movement, if known:     Constitutional: no sedation, not following any commands. Pupils reactive. HISTORY:     Active Problems:    Respiratory failure ([facility] [address] (11/16/2021)      Past Medical History:   Diagnosis Date    Anemia     Coronary artery disease involving native coronary artery of native heart without angina pectoris 11/18/2021    [facility] 6/20/17 for abnormal stress test LM ok LAD  prox with benny from RCA and distal LAD occluded D1 prox 70 D2 prox 90 Circ pMLI OM1 50%  RCA large, mid 36    Depression     Diabetes ([facility] [address]     Dysphagia     GERD (gastroesophageal reflux disease)     Hyperlipidemia     Hypertension     Nausea & vomiting     Stroke ([facility] [address] 5/16/2003      Past Surgical History:   Procedure Laterality Date    HX AMPUTATION FOOT Bilateral     HX ORTHOPAEDIC  7/20/2010    Bunion and toe repair left foot.  HX ORTHOPAEDIC      Plate in left hip.  UPPER GI ENDOSCOPY,BIOPSY  11/29/2021         UPPER GI ENDOSCOPY,DIAGNOSIS  11/29/2021           No family history on file. History reviewed, no pertinent family history.   Social History     Tobacco Use    Smoking status: Never Smoker    Smokeless tobacco: Not on file   Substance Use Topics    Alcohol use: No     Allergies   Allergen Reactions    Adhesive Tape-Silicones Rash    Trazodone Unknown (comments)     Listed in transfer paper 01/08/22      Current Facility-Administered Medications   Medication Dose Route Frequency    [START ON 1/18/2022] insulin glargine (LANTUS) injection 20 Units  20 Units SubCUTAneous DAILY    lacosamide (VIMPAT) 100 mg in 0.9% sodium chloride 100 mL IVPB  100 mg IntraVENous Q12H    guaiFENesin (ROBITUSSIN) 100 mg/5 mL oral liquid 100 mg  100 mg Oral Q4H PRN    albuterol (PROVENTIL VENTOLIN) nebulizer solution 2.5 mg  2.5 mg Nebulization Q4H PRN    zinc oxide-cod liver oil (DESITIN) 40 % paste Topical Q8H    chlorhexidine (ORAL CARE KIT) 0.12 % mouthwash 15 mL  15 mL Oral Q12H    heparin (porcine) injection 5,000 Units  5,000 Units SubCUTAneous Q8H    midazolam (VERSED) injection 2 mg  2 mg IntraVENous Q30MIN PRN    [Held by provider] dexmedeTOMidine in 0.9 % NaCl (PRECEDEX) 400 mcg/100 mL (4 mcg/mL) infusion soln  0.1-1.5 mcg/kg/hr IntraVENous TITRATE    atorvastatin (LIPITOR) tablet 40 mg  40 mg Oral DAILY    sodium chloride (NS) flush 5-40 mL  5-40 mL IntraVENous Q8H    sodium chloride (NS) flush 5-40 mL  5-40 mL IntraVENous PRN    acetaminophen (TYLENOL) tablet 650 mg  650 mg Oral Q6H PRN    Or    acetaminophen (TYLENOL) suppository 650 mg  650 mg Rectal Q6H PRN    polyethylene glycol (MIRALAX) packet 17 g  17 g Oral DAILY PRN    ondansetron (ZOFRAN ODT) tablet 4 mg  4 mg Oral Q8H PRN    Or    ondansetron (ZOFRAN) injection 4 mg  4 mg IntraVENous Q6H PRN    glucose chewable tablet 16 g  4 Tablet Oral PRN    dextrose (D50W) injection syrg 12.5-25 g  25-50 mL IntraVENous PRN    glucagon (GLUCAGEN) injection 1 mg  1 mg IntraMUSCular PRN    insulin lispro (HUMALOG) injection   SubCUTAneous Q6H    lansoprazole (PREVACID) 3 mg/mL oral suspension 30 mg  30 mg Per NG tube ACB    albumin human 25% (BUMINATE) solution 25 g  25 g IntraVENous PRN    [Held by provider] epoetin glenny-epbx (RETACRIT) injection 4,000 Units  4,000 Units SubCUTAneous Q TUE, THU & SAT    aspirin tablet 325 mg  325 mg Oral DAILY    clopidogreL (PLAVIX) tablet 75 mg  75 mg Oral DAILY          LAB AND IMAGING FINDINGS:     Lab Results   Component Value Date/Time    WBC 8.5 01/17/2022 05:36 AM    HGB 9.9 (L) 01/17/2022 05:36 AM    PLATELET 625 61/92/9922 05:36 AM     Lab Results   Component Value Date/Time    Sodium 137 01/17/2022 05:36 AM    Potassium 3.6 01/17/2022 05:36 AM    Chloride 99 01/17/2022 05:36 AM    CO2 30 01/17/2022 05:36 AM    BUN 48 (H) 01/17/2022 05:36 AM    Creatinine 6.83 (H) 01/17/2022 05:36 AM Calcium 10.0 01/17/2022 05:36 AM    Magnesium 2.5 (H) 01/12/2022 04:23 AM    Phosphorus 1.7 (L) 01/15/2022 04:47 AM      Lab Results   Component Value Date/Time    Alk. phosphatase 69 01/09/2022 02:03 AM    Protein, total 7.5 01/09/2022 02:03 AM    Albumin 3.0 (L) 01/09/2022 02:03 AM    Globulin 4.5 (H) 01/09/2022 02:03 AM     Lab Results   Component Value Date/Time    INR 1.1 01/09/2022 02:03 AM    Prothrombin time 11.3 (H) 01/09/2022 02:03 AM    aPTT 31.0 01/09/2022 02:03 AM      No results found for: IRON, FE, TIBC, IBCT, PSAT, FERR   Lab Results   Component Value Date/Time    pH 7.48 (H) 01/16/2022 09:47 AM    PCO2 42 01/16/2022 09:47 AM    PO2 81 01/16/2022 09:47 AM     No components found for: GLPOC   No results found for: CPK, CKMB             Total time: 70 min   Counseling / coordination time, spent as noted above: 50 min   > 50% counseling / coordination?: yes    Prolonged service was provided for  []30 min   []75 min in face to face time in the presence of the patient, spent as noted above. Time Start:   Time End:   Note: this can only be billed with 27251 (initial) or 68301 (follow up). If multiple start / stop times, list each separately.

## 2022-01-17 NOTE — PROGRESS NOTES
[facility]    ICU TEAM Progress Note    Name: Anthony Lindsay   : 1955   MRN: [id number]   Date: 2022      I  Subjective:   Progress Note: 2022      Reason for ICU Admission: Altered mental status, respiratory failure, COVID-19 infection     Interval history: From critical care H&P on   The patient is a 69-year-old male with a past medical history of diabetes, depression, stroke with left-sided deficit and dysarthria, hypertension, hyperlipidemia, CAD, end-stage renal disease, and BKA who presented to the emergency department having fallen out of bed. [facility] continued to have altered mental status and was stroke alerted upon arrival to the emergency department. [facility] was seen and evaluated by neurology and deemed not a candidate for tPA. Colleen Begun showed occluded cervical right vertebral artery with mild distal reconstitution in the distal occlusion at the V4 segment.  Patient was given Plavix.  Unfortunately patient had ongoing altered mental status and was therefore intubated by emergency medicine for airway protection. Patient was subsequently found to be Jonelle Raymond will now be admitted to the ICU for further work-up and management. Overnight Events:   : No acute event, failed SBT on placed on pressure support due to increased work of breathing and decreased tidal volume. Remained off Precedex. : No acute event overnight.   Reportedly failing SBT due to apnea and neurological status       Active Problem List:     Problem List  Never Reviewed          Codes Class    Coronary artery disease involving native coronary artery of native heart without angina pectoris (Chronic) ICD-10-CM: I25.10  ICD-9-CM: 414.01     Overview Signed 2021 12:29 PM by Estuardo Lion MD     Cath from [facility]  17 for abnormal stress test  LM ok  LAD  prox with benny from RCA and distal LAD occluded  D1 prox 70  D2 prox 90  Circ pMLI  OM1 50%   RCA large, mid 40             NSTEMI (non-ST elevated myocardial infarction) [facility]) ICD-10-CM: I21.4  ICD-9-CM: 410.70     Overview Signed 11/18/2021 12:30 PM by Blayne Pinzon MD     Peak troponin 2136 11/17/21  Hx of LAD  in 2017  EF 11/17/21 25%             Systolic CHF, acute on chronic [facility]) ICD-10-CM: I50.23  ICD-9-CM: 428.23, 428.0     Overview Signed 11/18/2021 12:31 PM by Blayne Pinzon MD     11/16/21    ECHO ADULT COMPLETE 11/18/2021 11/18/2021    Interpretation Summary  · LV: Estimated LVEF is 20 - 25%. Normal wall thickness. Mildly dilated left ventricle. Moderate-to-severely and segmentally reduced systolic function. Severe hypokinesis of the basal anterolateral, mid anterolateral and apical lateral wall(s). Dyskinesis of the basal inferoseptal and mid anteroseptal wall(s). · AV: Severe aortic valve focal thickening present. Moderate aortic valve sclerosis with no evidence of reduced excursion. Aortic valve leaflet calcification present. · MV: Mitral valve thickening. Mitral valve leaflet calcification without reduced excursion. Mild to moderate mitral valve regurgitation is present. · TV: Mild tricuspid valve regurgitation is present. · LA: Mildly dilated left atrium.     Signed by: Blayne Pinzon MD on 11/18/2021 12:08 AM                Respiratory failure (HCC) ICD-10-CM: J96.90  ICD-9-CM: 518.81         Anemia in chronic kidney disease ICD-10-CM: N18.9, D63.1  ICD-9-CM: 285.21         Diabetes mellitus due to underlying condition with diabetic nephropathy ([facility] [address] ICD-10-CM: E08.21  ICD-9-CM: 249.40, 583.81         End stage renal disease ([facility] [address] ICD-10-CM: N18.6  ICD-9-CM: 585.6               Past Medical History:      has a past medical history of Anemia, Coronary artery disease involving native coronary artery of native heart without angina pectoris (11/18/2021), Depression, Diabetes ([facility] [address] Dysphagia, GERD (gastroesophageal reflux disease), Hyperlipidemia, Hypertension, Nausea & vomiting, and Stroke ([facility] [address] (5/16/2003). Past Surgical History:      has a past surgical history that includes hx orthopaedic (7/20/2010); hx orthopaedic; hx amputation foot (Bilateral); upper gi endoscopy,biopsy (11/29/2021); and upper gi endoscopy,diagnosis (11/29/2021). Home Medications:     Prior to Admission medications    Medication Sig Start Date End Date Taking? Authorizing Provider   hydrALAZINE (APRESOLINE) 25 mg tablet Take 25 mg by mouth four (4) days a week. Receives 25mg in morning and 25mg in evening on Mon/Wed/Fri/Sun   Yes Provider, Historical   hydrALAZINE (APRESOLINE) 25 mg tablet Take 25 mg by mouth four (4) days a week. Receives 25mg in morning and 25mg in evening on Mon/Wed/Fri/Sun   Yes Provider, Historical   carvediloL (COREG) 6.25 mg tablet Take 1 Tablet by mouth two (2) times daily (with meals). 11/21/21  Yes Brandon Barraza MD   isosorbide dinitrate (ISORDIL) 10 mg tablet Take 1 Tablet by mouth three (3) times daily. 11/21/21  Yes Brandon Barraza MD   sevelamer carbonate (Renvela) 0.8 gram pwpk oral powder Take 0.8 g by mouth three (3) times daily (with meals). 5/19/21  Yes Libertad Watt MD   atorvastatin (LIPITOR) 40 mg tablet Take 40 mg by mouth nightly. 11/1/21  Yes Libertad Watt MD   Combigan 0.2-0.5 % drop ophthalmic solution Administer 1 Drop to left eye every twelve (12) hours. Glaucoma 11/1/21  Yes Shukri, MD Libertad   chlorproMAZINE (THORAZINE) 50 mg tablet 50 mg three (3) times daily. 11/8/21  Yes Libertad Watt MD   clopidogreL (PLAVIX) 75 mg tab Take 75 mg by mouth daily. 11/7/21  Yes Libertad Watt MD   gabapentin (NEURONTIN) 100 mg capsule 100 mg nightly. 10/26/21  Yes Shukri, MD Libertad   insulin lispro (HUMALOG) 100 unit/mL injection by SubCUTAneous route Before breakfast, lunch, dinner and at bedtime. Inject per sliding scale, 0-200= 0; 201-250= 2 units; 251-300= 4 units; 301-350= 6 units; 351-400= 8 units; 401-999= 10 units.    Greater than or equal to 401, give 10 units and CALL MD/NP, subcutaneously before meals and at bedtime for DM. 11/15/21  Yes Other, MD Libertad   latanoprost (XALATAN) 0.005 % ophthalmic solution Administer 1 Drop to left eye nightly. Unspecified Glaucoma 10/26/21  Yes Shukri, MD Libertad   omeprazole (PRILOSEC) 40 mg capsule Take 40 mg by mouth daily. 11/1/21  Yes Shukri, MD Libertad   sertraline (ZOLOFT) 50 mg tablet Take 50 mg by mouth daily. 11/1/21  Yes Shukri, MD Libertad   tamsulosin (FLOMAX) 0.4 mg capsule Take 0.4 mg by mouth nightly. 11/1/21  Yes Shukri, MD Libertad   Doxepin 3 mg tab Take 3 mg by mouth nightly. Yes Shukri, MD Libertad   multivitamin (ONE A DAY) tablet Take 1 Tablet by mouth daily. Yes Other, MD Libertad   senna (Senna) 8.6 mg tablet Take 1 Tablet by mouth nightly. Yes Other, MD Libertad   sodium chloride (OCEAN) 0.65 % nasal squeeze bottle 1 [place] by Both Nostrils route three (3) times daily as needed for Congestion. Yes Other, MD Libertad   acetaminophen (TylenoL) 325 mg tablet Take 650 mg by mouth every four (4) hours as needed for Pain. Yes Shukri, MD Libertad   ergocalciferol (Vitamin D2) 1,250 mcg (50,000 unit) capsule Take 50,000 Units by mouth every seven (7) days. EVERY Monday. Yes Other, MD Libertad   zinc sulfate (ZINCATE) 50 mg zinc (220 mg) capsule Take 1 Capsule by mouth daily. Yes Other, MD Libertad   aspirin 81 mg tablet Take 81 mg by mouth daily. Yes Other, MD Libertad   Glutose-15 40 % oral gel  10/21/21   Other, MD Libertad   ascorbic acid, vitamin C, (Vitamin C) 500 mg tablet Take 500 mg by mouth two (2) times a day. Other, MD Libertad       Allergies/Social/Family History: Allergies   Allergen Reactions    Adhesive Tape-Silicones Rash    Trazodone Unknown (comments)     Listed in transfer paper 01/08/22      Social History     Tobacco Use    Smoking status: Never Smoker    Smokeless tobacco: Not on file   Substance Use Topics    Alcohol use: No      No family history on file.     Review of Systems:     Not able to obtain due to patient medical condition    Objective:   Vital Signs:  Visit Vitals  BP (!) 148/83   Pulse 99   Temp 98.9 °F (37.2 °C)   Resp 22   Ht 6' 3\" (1.905 m)   Wt 70.6 kg (155 lb 10.3 oz)   SpO2 96%   BMI 19.45 kg/m²      O2 Device: Endotracheal tube,Ventilator Temp (24hrs), Av.1 °F (36.7 °C), Min:96.9 °F (36.1 °C), Max:98.9 °F (37.2 °C)           Intake/Output:     Intake/Output Summary (Last 24 hours) at 2022 4346  Last data filed at 2022 0400  Gross per 24 hour   Intake 1080 ml   Output    Net 1080 ml       Physical Exam:    Elderly male in bed in [facility]. CV RRR  Pulm No rales appreciated  GI Abd soft  Skin warm, dry    LABS AND  DATA: Personally reviewed  Recent Labs     22  0536 22  0426   WBC 8.5 7.6   HGB 9.9* 10.3*   HCT 29.9* 33.1*    220     Recent Labs     22  0536 22  0426 01/15/22  0447 01/15/22  0447    137   < > 137   K 3.6 3.8   < > 3.8   CL 99 97   < > 100   CO2 30 31   < > 28   BUN 48* 33*   < > 52*   CREA 6.83* 5.06*   < > 7.13*   * 228*   < > 269*   CA 10.0 10.1   < > 9.9   PHOS  --   --   --  1.7*    < > = values in this interval not displayed. No results for input(s): AP, TBIL, TP, ALB, GLOB, AML, LPSE in the last 72 hours. No lab exists for component: SGOT, GPT, AMYP  No results for input(s): INR, PTP, APTT, INREXT in the last 72 hours. No results for input(s): PHI, PCO2I, PO2I, FIO2I in the last 72 hours. No results for input(s): CPK, CKMB, TROIQ, BNPP in the last 72 hours. Hemodynamics:   PAP:   CO:     Wedge:   CI:     CVP:    SVR:       PVR:       Ventilator Settings:  Mode Rate Tidal Volume Pressure FiO2 PEEP   SIMV,Pressure support   480 ml  12 cm H2O 25 % 5 cm H20     Peak airway pressure: 16 cm H2O    Minute ventilation: 15.5 l/min        MEDS: Reviewed    Chest X-Ray:  CXR Results  (Last 48 hours)               22 0937  XR CHEST PORT Final result    Impression:  1. Clear lungs. 2. Feeding tube coiled in the stomach. Narrative:  PORTABLE CHEST RADIOGRAPH/S: 1/16/2022 9:36 AM       INDICATION: Respiratory failure. COMPARISON: 1/8/2022, 11/16/2021. TECHNIQUE: Portable frontal semiupright radiograph/s of the chest.       FINDINGS:    An ET tube is in appropriate position. A feeding tube is coiled in the stomach. The lungs are clear. The central airways are patent. No pneumothorax or pleural   effusion. Assessment and Plan:   Acute encephalopathy:   End-stage renal disease on hemodialysis:   Previous stroke with chronic left hemiplegia and other neurological deficits:   COVID-19 infection  Chronic congestive heart failure with ejection fraction 15-20%:  Respiratory failure: Multifactorial including COVID-19 infection and neurological status affecting ability to protect airway     1. Neurological System  EEG demonstrated diffuse slowing  CT head with no change  ASA and Plavix, Appreciate neurology     2. Cardiovascular System  BP improved  Pt has EF of 20-25 percent but cannot add beta blockade and ACE at this time due to low BP     3. Respiratory System  Wean FiO2 for pulse ox goal greater than 88%  SAT, SBT  On minimal settings but failing SBT.   I believe his neurological status significantly precluding extubation. Try SBT again. Appreciate palliative care input regarding goals of care. May need a tracheostomy.     4. Renal/GI/Endocrine System  Appreciate nephrology, HD as scheduled     5. Infectious Disease  COVID-19. Minimal oxygen requirement, no lung infiltrate on x-ray. No fever. Positive on January 8. I believe it is reasonable to discontinue droplet plus isolation and keep him on droplet isolation.     DVT and GI prophylaxis, ventilator bundle.     Prognosis is poor. Consult palliative care.     DISPOSITION  Stay in ICU    CRITICAL CARE CONSULTANT NOTE  I had a face to face encounter with the patient, reviewed and interpreted patient data including clinical events, labs, images, vital signs, I/O's, and examined patient. I have discussed the case and the plan and management of the patient's care with the consulting services, the bedside nurses and the respiratory therapist.      NOTE OF PERSONAL INVOLVEMENT IN CARE   This patient has a high probability of imminent, clinically significant deterioration, which requires the highest level of preparedness to intervene urgently. I participated in the decision-making and personally managed or directed the management of the following life and organ supporting interventions that required my frequent assessment to treat or prevent imminent deterioration. I personally spent 40 minutes of critical care time. This is time spent at this critically ill patient's bedside actively involved in patient care as well as the coordination of care and discussions with the patient's family. This does not include any procedural time which has been billed separately. Vamsi Bateman M.D.   Staff Intensivist/Pulmonologist  [facility]  1/17/2022

## 2022-01-17 NOTE — PROGRESS NOTES
Name: Josiane Vasquez   MRN: [id number]  : 1955        Assessment:                                    Plan:  ESRD-T//S--FMC/MCV  Intubated  Low K/Phos  COVID (+)  CVA  (B) amputee  DM  Occluded (R) vertebral artery  CMO ef 20-25%  Anemia Next HD tomorrow    UF of 1-2 Kg as benito    +Hypercalcemia noted-> will lower CA HD bath    Ct [facility]EN    D/W ICU RN           Subjective:  Seen thru glass door in ICU  Chart revd  D/W RN  No events overnight    ROS:   Deferred    Exam:  Visit Vitals  BP (!) 175/95   Pulse (!) 109   Temp 98.9 °F (37.2 °C)   Resp (!) 38   Ht 6' 3\" (1.905 m)   Wt 70.6 kg (155 lb 10.3 oz)   SpO2 96%   BMI 19.45 kg/m²     Chronic ill appearing   +ETT  +NGT      Current Facility-Administered Medications   Medication Dose Route Frequency Last Admin    lacosamide (VIMPAT) 100 mg in 0.9% sodium chloride 100 mL IVPB  100 mg IntraVENous Q12H 100 mg at 22 0914    guaiFENesin (ROBITUSSIN) 100 mg/5 mL oral liquid 100 mg  100 mg Oral Q4H  mg at 22 0201    albuterol (PROVENTIL VENTOLIN) nebulizer solution 2.5 mg  2.5 mg Nebulization Q4H PRN      zinc oxide-cod liver oil (DESITIN) 40 % paste   Topical Q8H Given at 22 1434    insulin glargine (LANTUS) injection 10 Units  10 Units SubCUTAneous DAILY 10 Units at 22 0900    chlorhexidine (ORAL CARE KIT) 0.12 % mouthwash 15 mL  15 mL Oral Q12H 15 mL at 22 0900    heparin (porcine) injection 5,000 Units  5,000 Units SubCUTAneous Q8H 5,000 Units at 22 0930    midazolam (VERSED) injection 2 mg  2 mg IntraVENous Q30MIN PRN 2 mg at 22 2046    [Held by provider] dexmedeTOMidine in 0.9 % NaCl (PRECEDEX) 400 mcg/100 mL (4 mcg/mL) infusion soln  0.1-1.5 mcg/kg/hr IntraVENous TITRATE Stopped at 22 0855    atorvastatin (LIPITOR) tablet 40 mg  40 mg Oral DAILY 40 mg at 01/17/22 0912    sodium chloride (NS) flush 5-40 mL  5-40 mL IntraVENous Q8H 10 mL at 01/16/22 1434    sodium chloride (NS) flush 5-40 mL  5-40 mL IntraVENous PRN      acetaminophen (TYLENOL) tablet 650 mg  650 mg Oral Q6H PRN      Or    acetaminophen (TYLENOL) suppository 650 mg  650 mg Rectal Q6H PRN      polyethylene glycol (MIRALAX) packet 17 g  17 g Oral DAILY PRN      ondansetron (ZOFRAN ODT) tablet 4 mg  4 mg Oral Q8H PRN      Or    ondansetron (ZOFRAN) injection 4 mg  4 mg IntraVENous Q6H PRN      glucose chewable tablet 16 g  4 Tablet Oral PRN      dextrose (D50W) injection syrg 12.5-25 g  25-50 mL IntraVENous PRN 25 g at 01/10/22 0911    glucagon (GLUCAGEN) injection 1 mg  1 mg IntraMUSCular PRN      insulin lispro (HUMALOG) injection   SubCUTAneous Q6H 3 Units at 01/17/22 0640    lansoprazole (PREVACID) 3 mg/mL oral suspension 30 mg  30 mg Per NG tube ACB 30 mg at 01/17/22 0912    albumin human 25% (BUMINATE) solution 25 g  25 g IntraVENous PRN 25 g at 01/15/22 1126    [Held by provider] epoetin glenny-epbx (RETACRIT) injection 4,000 Units  4,000 Units SubCUTAneous Q TUE, THU & SAT 4,000 Units at 01/08/22 2121    aspirin tablet 325 mg  325 mg Oral DAILY 325 mg at 01/17/22 0912    clopidogreL (PLAVIX) tablet 75 mg  75 mg Oral DAILY 75 mg at 01/17/22 0912       Labs/Data:    Lab Results   Component Value Date/Time    WBC 8.5 01/17/2022 05:36 AM    HGB 9.9 (L) 01/17/2022 05:36 AM    HCT 29.9 (L) 01/17/2022 05:36 AM    PLATELET 531 17/86/4000 05:36 AM    MCV 95.5 01/17/2022 05:36 AM       Lab Results   Component Value Date/Time    Sodium 137 01/17/2022 05:36 AM    Potassium 3.6 01/17/2022 05:36 AM    Chloride 99 01/17/2022 05:36 AM    CO2 30 01/17/2022 05:36 AM    Anion gap 8 01/17/2022 05:36 AM    Glucose 256 (H) 01/17/2022 05:36 AM    BUN 48 (H) 01/17/2022 05:36 AM    Creatinine 6.83 (H) 01/17/2022 05:36 AM    BUN/Creatinine ratio 7 (L) 01/17/2022 05:36 AM    GFR est AA 10 (L) 01/17/2022 05:36 AM    GFR est non-AA 8 (L) 01/17/2022 05:36 AM    Calcium 10.0 01/17/2022 05:36 AM       Wt Readings from Last 3 Encounters:   01/17/22 70.6 kg (155 lb 10.3 oz)   12/02/21 80 kg (176 lb 5.9 oz)   11/21/21 72.8 kg (160 lb 7.9 oz)         Intake/Output Summary (Last 24 hours) at 1/17/2022 0958  Last data filed at 1/17/2022 0400  Gross per 24 hour   Intake 1035 ml   Output    Net 1035 ml       Patient seen and examined. Chart reviewed. Labs, data and other pertinent notes reviewed in last 24 hrs.     PMH/SH/FH reviewed and unchanged compared to H&P    Oscar Herndon MD  [id number] [employer]

## 2022-01-17 NOTE — PROGRESS NOTES
Transition of Care Plan  RUR 23%    COVID 19 positive   Intubated and off sedation  Unsuccessful SBT 1/16/22  Palliative Care Consulted     Disposition  TBD pending medical progress    Patient was admitted from [facility]   Accepted and chosen in CC Link  Hx of CVA, ESRD, BKA, CAD    Transportation   BLS    Outpatient hemodialysis    [facility] [address] Tuesday Thursday Saturday  [phone]  Transported by wheelchair Evaline Slipper     Medical follow up  PCP and specialist    Contact  Sister Chasepayam Valdez [phone]  Sister  Norwalkvaleriy Bello  [phone]    Patient has [address]     Prior to admission patient was LTC at [facility] where he was using a motorized chair for mobility due to BKA. CM will continue to follow patient's medical progress and assist with transition of care planning.

## 2022-01-18 NOTE — PROCEDURES
01/18/22 1425   During Hemodialysis    Temp 98.9 °F (37.2 °C)   Temp source Axillary   Pulse (Heart Rate) 83   Resp Rate 28   O2 Sat (%) 98 %   /64   MAP (Calculated) 82   Transducer Checks Dry   Saline Given (mL) 200 mL   Heparin Bolus (units) 0 units   Continuous Heparin Infusion (Units/hr) 0 Units/hr   Blood Flow Rate (ml/min) 450 ml/min   Dialysate Flow Rate (ml/hr) 600 ml/hr   Arterial Access Pressure (mmHg) -240   Venous Return Pressure (mmHg) 240   Transmembrane Pressure (mmHg) 30 mmHg   Ultrafiltration Rate (ml/hr) 625 ml/hr   Fluid Removed (mL) 0   $$ Dialysis Charges   $$ Method Hemodialysis   TELEMETRY MONITOR HW TO RM 7112   01/18/22 1825   During Hemodialysis    Temp 97.9 °F (36.6 °C)   Pulse (Heart Rate) (!) 103   Resp Rate 20   O2 Sat (%) 100 %   /81   MAP (Calculated) 96   Fluid Removed (mL) 2000   Post-Dialysis   Rinseback Volume (ml) 200 ml   Duration of Treatment (hours) 4 hours   Patient Response to Treatment Well   Patient Disposition (TX @ BEDSIDE)   Condition of Dialyzer Filter Good   Hemodialysis End Time 1825     Assessment Pre Post   LOC Non-responsive NO CHANGE   Lungs Diminished, coarse NO CHANGE   Cardiac Wnl, rrr NO CHANGE   Skin W/d/i NO CHANGE   Edema None NO CHANGE   Pain Pain Intensity 1: 0 (01/18/22 1200) 0     Orders   Duration: Start: 1425 End: 1825 Total: 4   Dialyzer: Dialyzer/Set Up Inspection: Tammy Terry (01/18/22 1425)   K Bath: Dialysate K (mEq/L): 4 (01/18/22 1425)   Ca Bath: Dialysate CA (mEq/L): 2.0 (01/18/22 1425)   Na: Dialysate NA (mEq/L): 140 (01/18/22 1425)   Bicarb: Dialysate HCO3 (mEq/L): 35 (01/18/22 1425)   Target Fluid Removal: Goal/Amount of Fluid to Remove (mL): 2500 mL (01/18/22 1425)     Access   Type & Location: RUE AVF   Comments: +BRUIT/THRILL, NO S/S INFECTION/COMPLICATION @ SITE.  ACCESSED W/ 2 15G 1 IN NEEDLES W/O ISSUES, GOOD FLOW TO EACH LINE                        Labs     Lab Results   Component Value Date/Time    Hepatitis B surface Ag <0.10 01/08/2022 06:28 PM    Hepatitis B surface Ab 76.73 01/08/2022 06:28 PM      Obtained/Reviewed  Critical Results Called HGB   Date Value Ref Range Status   01/18/2022 10.5 (L) 12.1 - 17.0 g/dL Final     Potassium   Date Value Ref Range Status   01/18/2022 3.9 3.5 - 5.1 mmol/L Final     Calcium   Date Value Ref Range Status   01/18/2022 9.9 8.5 - 10.1 MG/DL Final     BUN   Date Value Ref Range Status   01/18/2022 66 (H) 6 - 20 MG/DL Final     Creatinine   Date Value Ref Range Status   01/18/2022 8.09 (H) 0.70 - 1.30 MG/DL Final        Meds Given                          N/A     Adequacy / Fluid    Total Liters Process: 82.2      Comments   Time Out Done:   (Time) 1415   Admitting Diagnosis: RESPIRATORY FAILURE, S/P FALL, COVID +   Consent obtained/signed: Informed Consent Verified: Yes (01/18/22 1425)   Machine / RO # Machine Number: Dagoberto Khan (01/18/22 1425)   Primary Nurse Rpt Pre: LEWIS Laurent RN   Primary Nurse Rpt Post: LEWIS Laurent RN   Pt Education: INAPPROPRIATE D/T PT CONDITION     Tx Summary   Comments: @1425 [address] [place] [address] W/O ISSUES  @1500 Pt tolerated tx well, nad  @1600 pt beginning to move RUE and samaria towards body. Unable to straighten arm. @1700 pt moving RUE in attempt to reach breathing tube, unable to redirect, primary RN made aware. @1422 tx completed w/o issues, blood returned, lines flushed, needles removed 1 @ time, manual pressure applied to each site until hemostasis achieved, no s/s active bleeding upon rn leaving pt room.

## 2022-01-18 NOTE — PROCEDURES
[facility]  EEG    Name:  Baron Baltazar  MR#:  [id number]  :  1955  ACCOUNT #:  [de-identified]  DATE OF SERVICE:  2022      REQUESTING PHYSICIAN:  Claudette Ling, DO    HISTORY:  The patient is a 16-year-old male who is being evaluated for altered mental status. DESCRIPTION:  This is an 18-channel EEG performed on a lethargic/poorly responsive patient. There is dominant background rhythm consisting of low-voltage frequencies in the 6 to 7 Hz frequency range. During most part of this recording, the background consists of generalized frontally dominant delta frequencies. Electrode popping artifact was seen in both frontal electrodes, i.e., F4 and F3. Drowsiness and sleep architecture were not noted. Stimulation of the patient produced muscle tension artifact. Photic stimulation elicits a symmetric driving response. EEG SUMMARY:  Abnormal EEG due to mild slowing of the background rhythm with bursts of moderate slowing. CLINICAL INTERPRETATION:  This EEG is suggestive of a mild-to-moderate generalized encephalopathic process, nonspecific in type. This may be related to an underlying structural brain injury and/or toxic/metabolic abnormality. No clearly lateralizing or epileptiform features were noted. No seizure was recorded.       Candi Washington MD      [id number]  D:  2022 14:10  T:  2022 0:55  JOB #:  [id number]

## 2022-01-18 NOTE — PROGRESS NOTES
[facility]    ICU TEAM Progress Note    Name: Matthieu Bateman   : 1955   MRN: [id number]   Date: 2022      I  Subjective:   Progress Note: 2022      Reason for ICU Admission: Altered mental status, respiratory failure, COVID-19 infection     Interval history: From critical care H&P on   The patient is a 25-year-old male with a past medical history of diabetes, depression, stroke with left-sided deficit and dysarthria, hypertension, hyperlipidemia, CAD, end-stage renal disease, and BKA who presented to the emergency department having fallen out of bed. [facility] continued to have altered mental status and was stroke alerted upon arrival to the emergency department. [facility] was seen and evaluated by neurology and deemed not a candidate for tPA. Gely Whitman showed occluded cervical right vertebral artery with mild distal reconstitution in the distal occlusion at the V4 segment.  Patient was given Plavix.  Unfortunately patient had ongoing altered mental status and was therefore intubated by emergency medicine for airway protection. Patient was subsequently found to be Augustine Fitch will now be admitted to the ICU for further work-up and management. Overnight Events:   : No acute event. Failed SBT.  : No acute event, failed SBT on placed on pressure support due to increased work of breathing and decreased tidal volume. Remained off Precedex.   : No acute event overnight.  Reportedly failing SBT due to apnea and neurological status       Active Problem List:     Problem List  Never Reviewed          Codes Class    Coronary artery disease involving native coronary artery of native heart without angina pectoris (Chronic) ICD-10-CM: I25.10  ICD-9-CM: 414.01     Overview Signed 2021 12:29 PM by Ranjan Mckeon MD     Cath from [facility]  17 for abnormal stress test  LM ok  LAD  prox with benny from RCA and distal LAD occluded  D1 prox 70  D2 prox 90  Circ pMLI  OM1 50% RCA large, mid 36             NSTEMI (non-ST elevated myocardial infarction) ([facility] [address] ICD-10-CM: I21.4  ICD-9-CM: 410.70     Overview Signed 11/18/2021 12:30 PM by Thom Marte MD     Peak troponin 2136 11/17/21  Hx of LAD  in 2017  EF 11/17/21 25%             Systolic CHF, acute on chronic [facility]) ICD-10-CM: I50.23  ICD-9-CM: 428.23, 428.0     Overview Signed 11/18/2021 12:31 PM by Thom Marte MD     11/16/21    ECHO ADULT COMPLETE 11/18/2021 11/18/2021    Interpretation Summary  · LV: Estimated LVEF is 20 - 25%. Normal wall thickness. Mildly dilated left ventricle. Moderate-to-severely and segmentally reduced systolic function. Severe hypokinesis of the basal anterolateral, mid anterolateral and apical lateral wall(s). Dyskinesis of the basal inferoseptal and mid anteroseptal wall(s). · AV: Severe aortic valve focal thickening present. Moderate aortic valve sclerosis with no evidence of reduced excursion. Aortic valve leaflet calcification present. · MV: Mitral valve thickening. Mitral valve leaflet calcification without reduced excursion. Mild to moderate mitral valve regurgitation is present. · TV: Mild tricuspid valve regurgitation is present. · LA: Mildly dilated left atrium.     Signed by: Thom Marte MD on 11/18/2021 12:08 AM                Respiratory failure (HCC) ICD-10-CM: J96.90  ICD-9-CM: 518.81         Anemia in chronic kidney disease ICD-10-CM: N18.9, D63.1  ICD-9-CM: 285.21         Diabetes mellitus due to underlying condition with diabetic nephropathy ([facility] [address] ICD-10-CM: E08.21  ICD-9-CM: 249.40, 583.81         End stage renal disease ([facility] [address] ICD-10-CM: N18.6  ICD-9-CM: 585.6               Past Medical History:      has a past medical history of Anemia, Coronary artery disease involving native coronary artery of native heart without angina pectoris (11/18/2021), Depression, Diabetes ([facility] [address] Dysphagia, GERD (gastroesophageal reflux disease), Hyperlipidemia, Hypertension, Nausea & vomiting, and Stroke ([facility] [address] (5/16/2003). Past Surgical History:      has a past surgical history that includes hx orthopaedic (7/20/2010); hx orthopaedic; hx amputation foot (Bilateral); upper gi endoscopy,biopsy (11/29/2021); and upper gi endoscopy,diagnosis (11/29/2021). Home Medications:     Prior to Admission medications    Medication Sig Start Date End Date Taking? Authorizing Provider   hydrALAZINE (APRESOLINE) 25 mg tablet Take 25 mg by mouth four (4) days a week. Receives 25mg in morning and 25mg in evening on Mon/Wed/Fri/Sun   Yes Provider, Historical   hydrALAZINE (APRESOLINE) 25 mg tablet Take 25 mg by mouth four (4) days a week. Receives 25mg in morning and 25mg in evening on Mon/Wed/Fri/Sun   Yes Provider, Historical   carvediloL (COREG) 6.25 mg tablet Take 1 Tablet by mouth two (2) times daily (with meals). 11/21/21  Yes Jamilah Delatorre MD   isosorbide dinitrate (ISORDIL) 10 mg tablet Take 1 Tablet by mouth three (3) times daily. 11/21/21  Yes Jamilah Delatorre MD   sevelamer carbonate (Renvela) 0.8 gram pwpk oral powder Take 0.8 g by mouth three (3) times daily (with meals). 5/19/21  Yes Shukri, MD [facility]   atorvastatin (LIPITOR) 40 mg tablet Take 40 mg by mouth nightly. 11/1/21  Yes Shukri, MD [facility]   Combigan 0.2-0.5 % drop ophthalmic solution Administer 1 Drop to left eye every twelve (12) hours. Glaucoma 11/1/21  Yes Shukri, MD [facility]   chlorproMAZINE (THORAZINE) 50 mg tablet 50 mg three (3) times daily. 11/8/21  Yes Libertad Watt MD   clopidogreL (PLAVIX) 75 mg tab Take 75 mg by mouth daily. 11/7/21  Yes Shukri, MD [facility]   gabapentin (NEURONTIN) 100 mg capsule 100 mg nightly. 10/26/21  Yes Shukri, MD [facility]   insulin lispro (HUMALOG) 100 unit/mL injection by SubCUTAneous route Before breakfast, lunch, dinner and at bedtime. Inject per sliding scale, 0-200= 0; 201-250= 2 units; 251-300= 4 units; 301-350= 6 units; 351-400= 8 units; 401-999= 10 units.    Greater than or equal to 401, give 10 units and CALL MD/NP, subcutaneously before meals and at bedtime for DM. 11/15/21  Yes Other, MD [facility]   latanoprost (XALATAN) 0.005 % ophthalmic solution Administer 1 Drop to left eye nightly. Unspecified Glaucoma 10/26/21  Yes Other, MD [facility]   omeprazole (PRILOSEC) 40 mg capsule Take 40 mg by mouth daily. 11/1/21  Yes Shukri, MD [facility]   sertraline (ZOLOFT) 50 mg tablet Take 50 mg by mouth daily. 11/1/21  Yes Other, MD [facility]   tamsulosin (FLOMAX) 0.4 mg capsule Take 0.4 mg by mouth nightly. 11/1/21  Yes Shukri, MD [facility]   Doxepin 3 mg tab Take 3 mg by mouth nightly. Yes Other, MD [facility]   multivitamin (ONE A DAY) tablet Take 1 Tablet by mouth daily. Yes Other, MD [facility]   senna (Senna) 8.6 mg tablet Take 1 Tablet by mouth nightly. Yes Other, MD [facility]   sodium chloride (OCEAN) 0.65 % nasal squeeze bottle 1 [place] by Both Nostrils route three (3) times daily as needed for Congestion. Yes Other, MD [facility]   acetaminophen (TylenoL) 325 mg tablet Take 650 mg by mouth every four (4) hours as needed for Pain. Yes Other, MD [facility]   ergocalciferol (Vitamin D2) 1,250 mcg (50,000 unit) capsule Take 50,000 Units by mouth every seven (7) days. EVERY Monday. Yes Other, MD [facility]   zinc sulfate (ZINCATE) 50 mg zinc (220 mg) capsule Take 1 Capsule by mouth daily. Yes Other, MD [facility]   aspirin 81 mg tablet Take 81 mg by mouth daily. Yes Other, MD [facility]   Glutose-15 40 % oral gel  10/21/21   Other, MD [facility]   ascorbic acid, vitamin C, (Vitamin C) 500 mg tablet Take 500 mg by mouth two (2) times a day. Other, MD [facility]       Allergies/Social/Family History: Allergies   Allergen Reactions    Adhesive Tape-Silicones Rash    Trazodone Unknown (comments)     Listed in transfer paper 01/08/22      Social History     Tobacco Use    Smoking status: Never Smoker    Smokeless tobacco: Not on file   Substance Use Topics    Alcohol use: No      No family history on file.     Review of Systems:     Not able to obtain due to his medical condition    Objective:   Vital Signs:  Visit Vitals  BP (!) 164/89   Pulse 91   Temp (!) 100.9 °F (38.3 °C)   Resp 23   Ht 6' 3\" (1.905 m)   Wt 70.6 kg (155 lb 10.3 oz)   SpO2 99%   BMI 19.45 kg/m²      O2 Device: Ventilator,Endotracheal tube Temp (24hrs), Av.6 °F (37.6 °C), Min:98.6 °F (37 °C), Max:100.9 °F (38.3 °C)           Intake/Output:     Intake/Output Summary (Last 24 hours) at 2022 0746  Last data filed at 2022 0600  Gross per 24 hour   Intake 1385 ml   Output    Net 1385 ml       Physical Exam:  Elderly male in bed in [facility]. CV RRR  Pulm No rales appreciated  GI Abd soft  Skin warm, dryElderly male in bed in [facility]. Patient is intubated, does not open eyes spontaneously, right pupil is not reactive. Could not appreciate facial asymmetry. Left chronic hemiplegia with contraction, occasional movement spontaneously on right side but does not seems to be purposeful. LABS AND  DATA: Personally reviewed  Recent Labs     22  0126 22  0536   WBC 9.2 8.5   HGB 10.5* 9.9*   HCT 32.3* 29.9*    222     Recent Labs     22  0126 22  0536    137   K 3.9 3.6    99   CO2 31 30   BUN 66* 48*   CREA 8.09* 6.83*   * 256*   CA 9.9 10.0     No results for input(s): AP, TBIL, TP, ALB, GLOB, AML, LPSE in the last 72 hours. No lab exists for component: SGOT, GPT, AMYP  No results for input(s): INR, PTP, APTT, INREXT in the last 72 hours. No results for input(s): PHI, PCO2I, PO2I, FIO2I in the last 72 hours. No results for input(s): CPK, CKMB, TROIQ, BNPP in the last 72 hours.     Hemodynamics:   PAP:   CO:     Wedge:   CI:     CVP:    SVR:       PVR:       Ventilator Settings:  Mode Rate Tidal Volume Pressure FiO2 PEEP   SIMV,Volume control,Pressure support   600 ml  10 cm H2O 30 % 5 cm H20     Peak airway pressure: 18 cm H2O    Minute ventilation: 9.56 l/min        MEDS: Reviewed    Chest X-Ray:  CXR Results  (Last 48 hours)               22 9279 XR CHEST PORT Final result    Impression:  1. Clear lungs. 2. Feeding tube coiled in the stomach. Narrative:  PORTABLE CHEST RADIOGRAPH/S: 1/16/2022 9:36 AM       INDICATION: Respiratory failure. COMPARISON: 1/8/2022, 11/16/2021. TECHNIQUE: Portable frontal semiupright radiograph/s of the chest.       FINDINGS:    An ET tube is in appropriate position. A feeding tube is coiled in the stomach. The lungs are clear. The central airways are patent. No pneumothorax or pleural   effusion. Assessment and Plan:   Acute encephalopathy:   End-stage renal disease on hemodialysis:   Previous stroke with chronic left hemiplegia and other neurological deficits:   COVID-19 infection  Chronic congestive heart failure with ejection fraction 15-20%:  Respiratory failure: Multifactorial including COVID-19 infection and neurological status affecting ability to protect airway     1. Neurological System  EEG demonstrated diffuse slowing, repeated EEG result pending  CT head with no change  ASA and Plavix, Appreciate neurology     2. Cardiovascular System  BP improved  Pt has EF of 20-25 percent, start low-dose beta-blocker and ACE inhibitor     3. Respiratory System  Wean FiO2 for pulse ox goal greater than 88%  SAT, SBT  On minimal settings but failing SBT.    I believe his neurological status significantly precluding extubation. Try SBT again. Appreciate palliative care input regarding goals of care. May need a tracheostomy.     4. Renal/GI/Endocrine System  Appreciate nephrology, HD as scheduled     5. Infectious Disease  COVID-19. Minimal oxygen requirement, no lung infiltrate on x-ray. No fever. Positive on January 8. I believe it is reasonable to discontinue droplet plus isolation and keep him on droplet isolation.     DVT and GI prophylaxis, ventilator bundle.     Prognosis is poor.   I appreciate palliative care input    DISPOSITION  Stay in ICU    [facility] NOTE  I had a face to face encounter with the patient, reviewed and interpreted patient data including clinical events, labs, images, vital signs, I/O's, and examined patient. I have discussed the case and the plan and management of the patient's care with the consulting services, the bedside nurses and the respiratory therapist.      NOTE OF PERSONAL INVOLVEMENT IN CARE   This patient has a high probability of imminent, clinically significant deterioration, which requires the highest level of preparedness to intervene urgently. I participated in the decision-making and personally managed or directed the management of the following life and organ supporting interventions that required my frequent assessment to treat or prevent imminent deterioration. I personally spent 40 minutes of critical care time. This is time spent at this critically ill patient's bedside actively involved in patient care as well as the coordination of care and discussions with the patient's family. This does not include any procedural time which has been billed separately. Marycruz Demarco M.D.   Staff Intensivist/Pulmonologist  [facility]  1/18/2022

## 2022-01-18 NOTE — PROGRESS NOTES
01/18/22 0923   Weaning Parameters   Spontaneous Breathing Trial Complete Yes   Resp Rate Observed 26   Ve 9.9      RSBI 81   Patient current SBT 5/5 30%

## 2022-01-18 NOTE — PROGRESS NOTES
Neurology Progress Note  Misa Helton NP    Patient: Anthony Lindsay MRN: [id number]  SSN: [id number]    YOB: 1955  Age: 78 y.o. Sex: male      Chief Complaint: Continued unresponsiveness, involuntary movements    Subjective:      Anthony Lindsay is a 78 y.o. M with a past medical history of CVA, HTN, hyperlipidemia, GERD, dysphagia, DM, CAD, anemia, GERD, depression, bilateral BKA, and CKD on dialysis who presented to the ER at [facility] via EMS in the early am on 01/08/22 after being found on the floor at [facility] after falling out of bed at approximately 0100. He was unresponsive. He was known to be positive with COVID-19. A Code Stroke was called on his arrival to the ER. [address] showed no acute abnormality. CTA H/N showed extensive vertebrobasilar disease with an occluded cervical right vertebral artery with some mild distal reconstitution and distal occlusion at the V4 segment. There was a dominant left VA with severe stenosis of the V4 segment on the left side. The basilar artery was with diffuse stenosis. Corresponding perfusion abnormalities were noted in the brainstem. No LVO. He was not a candidate for tPA or for mechanical thrombectomy. Patient was intubated in ER to protect his airway. According to record he had been on aspirin 81 mg q day, Plavix 75 mg po q day and Lipitor 40 mg po q day. He was loaded with aspirin and Plavix in the ER and he has continued on aspirin, Plavix and Lipitor during his admission. There was concern for possible subclinical seizure due to his unresponsiveness, so EEG was completed on 01/10/22 which showed no seizure activity. There was a mild generalized encephalopathic process. Neurology was re-consulted due to patient remaining unresponsive and now having some possible seizure-like activity versus posturing. He was given 2 mg of Ativan by Intensivist due to left eye gaze deviation and jerking movements and rigidity.      Past Medical History: Diagnosis Date    Anemia     Coronary artery disease involving native coronary artery of native heart without angina pectoris 2021    Cath from [address] [place] [address] 17 for abnormal stress test LM ok LAD  prox with benny from RCA and distal LAD occluded D1 prox 70 D2 prox 90 Circ pMLI OM1 50%  RCA large, mid 36    Depression     Diabetes ([facility] [address]     Dysphagia     GERD (gastroesophageal reflux disease)     Hyperlipidemia     Hypertension     Nausea & vomiting     Stroke ([facility] [address] 2003     No family history on file. Social History     Tobacco Use    Smoking status: Never Smoker    Smokeless tobacco: Not on file   Substance Use Topics    Alcohol use: No      Prior to Admission Medications   Prescriptions Last Dose Informant Patient Reported? Taking? Combigan 0.2-0.5 % drop ophthalmic solution   Yes Yes   Sig: Administer 1 Drop to left eye every twelve (12) hours. Glaucoma   Doxepin 3 mg tab   Yes Yes   Sig: Take 3 mg by mouth nightly. Glutose-15 40 % oral gel   Yes No   acetaminophen (TylenoL) 325 mg tablet   Yes Yes   Sig: Take 650 mg by mouth every four (4) hours as needed for Pain. ascorbic acid, vitamin C, (Vitamin C) 500 mg tablet Unknown at Unknown time  Yes No   Sig: Take 500 mg by mouth two (2) times a day. aspirin 81 mg tablet   Yes Yes   Sig: Take 81 mg by mouth daily. atorvastatin (LIPITOR) 40 mg tablet   Yes Yes   Sig: Take 40 mg by mouth nightly. carvediloL (COREG) 6.25 mg tablet   No Yes   Sig: Take 1 Tablet by mouth two (2) times daily (with meals). chlorproMAZINE (THORAZINE) 50 mg tablet   Yes Yes   Si mg three (3) times daily. clopidogreL (PLAVIX) 75 mg tab 2022 at Unknown time  Yes Yes   Sig: Take 75 mg by mouth daily. ergocalciferol (Vitamin D2) 1,250 mcg (50,000 unit) capsule   Yes Yes   Sig: Take 50,000 Units by mouth every seven (7) days. EVERY Monday. gabapentin (NEURONTIN) 100 mg capsule   Yes Yes   Si mg nightly.    hydrALAZINE (APRESOLINE) 25 mg tablet   Yes Yes   Sig: Take 25 mg by mouth four (4) days a week. Receives 25mg in morning and 25mg in evening on Mon/Wed/Fri/Sun   hydrALAZINE (APRESOLINE) 25 mg tablet   Yes Yes   Sig: Take 25 mg by mouth four (4) days a week. Receives 25mg in morning and 25mg in evening on Mon/Wed/Fri/Sun   insulin lispro (HUMALOG) 100 unit/mL injection   Yes Yes   Sig: by SubCUTAneous route Before breakfast, lunch, dinner and at bedtime. Inject per sliding scale, 0-200= 0; 201-250= 2 units; 251-300= 4 units; 301-350= 6 units; 351-400= 8 units; 401-999= 10 units. Greater than or equal to 401, give 10 units and CALL MD/NP, subcutaneously before meals and at bedtime for DM.   isosorbide dinitrate (ISORDIL) 10 mg tablet   No Yes   Sig: Take 1 Tablet by mouth three (3) times daily. latanoprost (XALATAN) 0.005 % ophthalmic solution   Yes Yes   Sig: Administer 1 Drop to left eye nightly. Unspecified Glaucoma   multivitamin (ONE A DAY) tablet   Yes Yes   Sig: Take 1 Tablet by mouth daily. omeprazole (PRILOSEC) 40 mg capsule   Yes Yes   Sig: Take 40 mg by mouth daily. senna (Senna) 8.6 mg tablet   Yes Yes   Sig: Take 1 Tablet by mouth nightly. sertraline (ZOLOFT) 50 mg tablet   Yes Yes   Sig: Take 50 mg by mouth daily. sevelamer carbonate (Renvela) 0.8 gram pwpk oral powder   Yes Yes   Sig: Take 0.8 g by mouth three (3) times daily (with meals). sodium chloride (OCEAN) 0.65 % nasal squeeze bottle   Yes Yes   Si Brownell by Both Nostrils route three (3) times daily as needed for Congestion. tamsulosin (FLOMAX) 0.4 mg capsule   Yes Yes   Sig: Take 0.4 mg by mouth nightly. zinc sulfate (ZINCATE) 50 mg zinc (220 mg) capsule   Yes Yes   Sig: Take 1 Capsule by mouth daily.       Facility-Administered Medications: None       Allergies   Allergen Reactions    Adhesive Tape-Silicones Rash    Trazodone Unknown (comments)     Listed in transfer paper 22       Review of Systems:  Review of systems not obtained due to patient factors. Unresponsiveness. Objective:     Vitals:    01/17/22 2100 01/17/22 2200 01/17/22 2300 01/18/22 0000   BP: (!) 175/95 (!) 174/99 (!) 182/99 (!) 162/90   Pulse: (!) 113 (!) 110 (!) 109 (!) 118   Resp: 20 24 29 12   Temp:    99.6 °F (37.6 °C)   TempSrc:       SpO2: 99% 98% 99% 99%   Weight:       Height:          Physical Exam:  GENERAL: Ill appearing male, intubated and sedated with Precedex. SKIN: Warm, dry, color appropriate for ethnicity. EXTREMITIES: Bilateral lower extremity BKAs. Neurologic Exam:  Mental Status:  No eye opening, no command following. Language:    Mute, ETT in place. Cranial Nerves:   Left pupil 3 mm round reactive to light, right pupil approximately 3 mm irregularly shaped and appears fixed. Blink to threat in the left eye, no blink to threat on the right. Right eye esotropia with gaze down. (Most likely chronic blindness as CTH shows right eye phthisis bulbi). Eyes moving when opened. No facial asymmetry noted around ETT. Motor:    Left arm and hand contracted and rigid. Occasional spontaneous involuntary movements noted throughout. Sensation:    No withdrawal to noxious stimuli but does move spontaneously  Reflexes:    Positive corneal reflex in left eye, none in right eye.        Labs:  Lab Results   Component Value Date/Time    WBC 8.5 01/17/2022 05:36 AM    HGB 9.9 (L) 01/17/2022 05:36 AM    HCT 29.9 (L) 01/17/2022 05:36 AM    PLATELET 856 82/30/7838 05:36 AM    MCV 95.5 01/17/2022 05:36 AM      Lab Results   Component Value Date/Time    Sodium 137 01/17/2022 05:36 AM    Potassium 3.6 01/17/2022 05:36 AM    Chloride 99 01/17/2022 05:36 AM    CO2 30 01/17/2022 05:36 AM    Anion gap 8 01/17/2022 05:36 AM    Glucose 256 (H) 01/17/2022 05:36 AM    BUN 48 (H) 01/17/2022 05:36 AM    Creatinine 6.83 (H) 01/17/2022 05:36 AM    BUN/Creatinine ratio 7 (L) 01/17/2022 05:36 AM    GFR est AA 10 (L) 01/17/2022 05:36 AM    GFR est non-AA 8 (L) 01/17/2022 05:36 AM    Calcium 10.0 01/17/2022 05:36 AM     No results found for: CPK, RCK1, RCK2, RCK3, RCK4, CKMB, CKNDX, CKND1, TROPT, TROIQ, BNPP, BNP    Imaging:  CT Results (maximum last 3): Results from [place] encounter on 01/08/22    CT HEAD WO CONT    Narrative  INDICATION: AMS    EXAM:  HEAD CT WITHOUT CONTRAST    COMPARISON: January 8, 2022    TECHNIQUE:  Routine noncontrast axial head CT was performed. Sagittal and  coronal reconstructions were generated. CT dose reduction was achieved through use of a standardized protocol tailored  for this examination and automatic exposure control for dose modulation. FINDINGS:    Ventricles: Midline, no hydrocephalus. Intracranial Hemorrhage: None. Brain Parenchyma/Brainstem: Chronic right MCA territory infarction. Chronic  right inferior cerebellar infarction. Generalized atrophy. Basal Cisterns: Normal.  Paranasal Sinuses: Visualized sinuses are clear. Additional Comments: Intravascular contrast. Right phthisis bulbi. Impression  No acute process. CTA CODE NEURO HEAD AND NECK W CONT    Addendum 1/8/2022  4:04 AM  Addendum: 69% stenosis at the origin of the right internal carotid artery    Narrative  EXAM: CTA CODE NEURO HEAD AND NECK W CONT, CT CODE NEURO PERF W CBF    INDICATION: unresponsive and s/p fall    COMPARISON: None. CONTRAST: 100 mL of Isovue-370. TECHNIQUE:  Unenhanced  images were obtained to localize the volume for  acquisition. Multislice helical axial CT angiography was performed from the  aortic arch to the top of the head during uneventful rapid bolus intravenous  contrast administration. Coronal and sagittal reformations and 3D post  processing was performed. CT dose reduction was achieved through use of a  standardized protocol tailored for this examination and automatic exposure  control for dose modulation. This study was analyzed by the [address] ai algorithm.     CT brain perfusion was performed with generation of hemodynamic maps of multiple  parameters, including cerebral blood flow, cerebral blood volume, and MTT (mean  transit time). CT dose reduction was achieved through use of a standardized  protocol tailored for this examination and automatic exposure control for dose  modulation. FINDINGS:    CTA NECK  There is conventional three vessel arch anatomy. Calcified plaque and stenosis  is noted at the origin of the right internal carotid artery. % of right carotid artery stenosis: 69%  % of left carotid artery stenosis: No significant stenosis    NASCET method was utilized for calculating stenosis. The proximal right vertebral artery is very small in caliber and then not  visualized in the mid neck. There is some mild reconstitution distally. The  cervical portion of the left vertebral artery is patent. Spiculated scarring in  the bilateral lung apices. Small calcified right globe. There are degenerative  changes of the cervical spine. CTA HEAD  Left jugular is dominant. The distal right vertebral artery is occluded. There  is severe stenosis in the distal left vertebral artery. . There is diffuse  multifocal stenosis in the basilar artery which is already small in caliber. Fetal origin the bilateral posterior cerebral arteries. . Moderate  atherosclerotic disease is present in the cavernous portion of the bilateral  internal carotid arteries. . . There is no aneurysm. Chronic small vessel  ischemic disease. CT PERFUSION:    There is a regional area of a elevated Tmax involving the brainstem. rCBF < 30% = 0 cc. Tmax > 6 seconds = 20 cc. Impression  1. Extensive vertebrobasilar disease. Occluded cervical right vertebral artery  with some mild distal reconstitution and then distal occlusion at the V4  segment. There is a dominant left vertebral artery with severe stenosis of the  V4 segment of the left vertebral artery.  The basilar artery is small caliber  with diffuse stenosis. 2.  Corresponding perfusion abnormalities noted in the brainstem. 3.  Chronic small vessel ischemic disease. .    4.  Findings were discussed with Dr. Nyla Lee at the time of dictation      CT CODE NEURO PERF W CBF    Addendum 1/8/2022  4:04 AM  Addendum: 69% stenosis at the origin of the right internal carotid artery    Narrative  EXAM: CTA CODE NEURO HEAD AND NECK W CONT, CT CODE NEURO PERF W CBF    INDICATION: unresponsive and s/p fall    COMPARISON: None. CONTRAST: 100 mL of Isovue-370. TECHNIQUE:  Unenhanced  images were obtained to localize the volume for  acquisition. Multislice helical axial CT angiography was performed from the  aortic arch to the top of the head during uneventful rapid bolus intravenous  contrast administration. Coronal and sagittal reformations and 3D post  processing was performed. CT dose reduction was achieved through use of a  standardized protocol tailored for this examination and automatic exposure  control for dose modulation. This study was analyzed by the [address] ai algorithm. CT brain perfusion was performed with generation of hemodynamic maps of multiple  parameters, including cerebral blood flow, cerebral blood volume, and MTT (mean  transit time). CT dose reduction was achieved through use of a standardized  protocol tailored for this examination and automatic exposure control for dose  modulation. FINDINGS:    CTA NECK  There is conventional three vessel arch anatomy. Calcified plaque and stenosis  is noted at the origin of the right internal carotid artery. % of right carotid artery stenosis: 69%  % of left carotid artery stenosis: No significant stenosis    NASCET method was utilized for calculating stenosis. The proximal right vertebral artery is very small in caliber and then not  visualized in the mid neck. There is some mild reconstitution distally. The  cervical portion of the left vertebral artery is patent.  Spiculated scarring in  the bilateral lung apices. Small calcified right globe. There are degenerative  changes of the cervical spine. CTA HEAD  Left jugular is dominant. The distal right vertebral artery is occluded. There  is severe stenosis in the distal left vertebral artery. . There is diffuse  multifocal stenosis in the basilar artery which is already small in caliber. Fetal origin the bilateral posterior cerebral arteries. . Moderate  atherosclerotic disease is present in the cavernous portion of the bilateral  internal carotid arteries. . . There is no aneurysm. Chronic small vessel  ischemic disease. CT PERFUSION:    There is a regional area of a elevated Tmax involving the brainstem. rCBF < 30% = 0 cc. Tmax > 6 seconds = 20 cc. Impression  1. Extensive vertebrobasilar disease. Occluded cervical right vertebral artery  with some mild distal reconstitution and then distal occlusion at the V4  segment. There is a dominant left vertebral artery with severe stenosis of the  V4 segment of the left vertebral artery. The basilar artery is small caliber  with diffuse stenosis. 2.  Corresponding perfusion abnormalities noted in the brainstem. 3.  Chronic small vessel ischemic disease. .    4.  Findings were discussed with Dr. Napoleon Grimm at the time of dictation    Assessment:     Hospital Problems  Never Reviewed          Codes Class Noted POA    Respiratory failure [facility]) ICD-10-CM: J96.90  ICD-9-CM: 518.81  11/16/2021 Unknown            Plan:   Continued unresponsiveness with involuntary movements and gaze deviation. Some improvement noted in involuntary movement and gaze deviation after IV Ativan given. Concern for seizure versus posturing in critically ill, encephalopathic patient with COVID-19, respiratory failure, heart failure, and CKD. -Patient with extensive vertebrobasilar disease on CTA H/N on aspirin, Plavix and Lipitor.   -No acute stroke noted on MRI completed 01/11/22.  There was atrophy, extensive chronic white matter disease, and chronic infarction in the right corona radiata, deep right frontal lobe and right cerebellum. There was associated Wallerian degeneration and probable pontine chronic infarction. Small chronic lacunar infarctions in the inferior left cerebellum. Several chronic micro-hemorrhages in the left temporal lobe and left frontal lobe. No acute hemorrhage. Right phthisis bulbi. - EEG on 01/10/22 showed no clearly lateralizing or epileptiform features. There was a mild generalized encephalopathic process. -EEG repeated on 01/14/22 showed severe generalized encephalopathic process. May be related to underlying structural brain injury and or toxic metabolic abnormality. No clearly lateralizing or epileptiform features seen. -EEG on 01/17/22 result pending.   -Continue Vimpat 100 mg IV bid.   - ECHO with EF of 15-20%. Severe global hypokinesis. -Frequent neuro checks per unit protocol  -CTH repeat on 01/14/21 showed no acute process. Chronic right corona radiata infarction and right  sided Wallerian degeneration. Chronic right cerebellar infarction. White matter disease.   -Supportive care by Intensivist and Nephrologist      I have discussed the diagnosis and the intended plan with Intensivist. Further recommendations to follow from [facility]    Thank you for this consult and participating in the care of this patient. Signed By: Ora Khoury NP     January 18, 2022        Neurology staff:     I examined the patient at the bedside.  I agree with the plans and documentation above from NP [facility].  Mr. Yenny Hunter is a 62-year-old gentleman admitted on January 8 with acute respiratory failure from his nursing home after a fall from his bed. Zadie Bernheim is intubated.  Yesterday EEG was done without any seizures. No acute changes from yesterday.     On exam, there in no sedation.  He does follow commands for me squeezing with the right hand but nothing else.  Eyes are deviated down which apparently is baseline for him but show movement to light exam.  Pupils appear equal reactive.  He grimaces to pain BUE.  Bilateral BKA noted. 79-year-old gentleman who remains poorly responsive but a little bit more alert today.  EEG without seizures. Stay on anticonvulsants. Continue supportive care. We will follow peripherally. Please call if needed.   Sang Hunter,   Neurologist  Diplomate, American Board of Psychiatry and Neurology  Board Certified, Adult Neurology and Brain Injury Medicine

## 2022-01-18 NOTE — PROGRESS NOTES
0730: Bedside and Verbal shift change report given to Marilee Horvath RN (oncoming nurse) by Karrie Whitten RN (offgoing nurse). Report included the following information SBAR, Kardex, Intake/Output, MAR, Accordion, Recent Results, Med Rec Status, Cardiac Rhythm NSR, Alarm Parameters  and Dual Neuro Assessment.        0800:Primary Nurse Nancy Mobley and Venus Pinon RN performed a dual skin assessment on this patient Impairment noted- see wound doc flow sheet  Laureano score is 11

## 2022-01-18 NOTE — PROGRESS NOTES
Name: Germán Cortez   MRN: [id number]  : 1955        Assessment:                                    Plan:  ESRD-T//S--FMC/MCV  Intubated  Low K/Phos  COVID (+)  CVA  (B) amputee  DM  Occluded (R) vertebral artery  CMO ef 20-25%  Anemia Next HD today    UF of 1-2 Kg as benito    +Hypercalcemia noted-> lower CA HD bath    Ct WARREN    Unsuccessful SBT  Neurology following to assess encephalopathy/seizure like activity. Palliative care on board. D/W ICU RN           Subjective:  Patient seen and examined.  No changes clinically per RN  D/W RN      ROS:   Deferred    Exam:  Visit Vitals  /78   Pulse 92   Temp 97.8 °F (36.6 °C)   Resp (!) 36   Ht 6' 3\" (1.905 m)   Wt 70.6 kg (155 lb 10.3 oz)   SpO2 96%   BMI 19.45 kg/m²     Intubated  +ETT  +NGT  B/l BKA      Current Facility-Administered Medications   Medication Dose Route Frequency Last Admin    insulin glargine (LANTUS) injection 20 Units  20 Units SubCUTAneous Q12H 20 Units at 22 0838    lisinopriL (PRINIVIL, ZESTRIL) tablet 5 mg  5 mg Oral DAILY 5 mg at 22 0839    carvediloL (COREG) tablet 6.25 mg  6.25 mg Oral BID WITH MEALS 6.25 mg at 22 0839    isosorbide dinitrate (ISORDIL) tablet 10 mg  10 mg Oral TID 10 mg at 22 0839    lacosamide (VIMPAT) 100 mg in 0.9% sodium chloride 100 mL IVPB  100 mg IntraVENous Q12H 100 mg at 22 0936    guaiFENesin (ROBITUSSIN) 100 mg/5 mL oral liquid 100 mg  100 mg Oral Q4H  mg at 22 0201    albuterol (PROVENTIL VENTOLIN) nebulizer solution 2.5 mg  2.5 mg Nebulization Q4H PRN      zinc oxide-cod liver oil (DESITIN) 40 % paste   Topical Q8H Given at 22 2794    chlorhexidine (ORAL CARE KIT) 0.12 % mouthwash 15 mL  15 mL Oral Q12H 15 mL at 22 0900    heparin (porcine) injection 5,000 Units  5,000 Units SubCUTAneous Q8H 5,000 Units at 01/18/22 4975    midazolam (VERSED) injection 2 mg  2 mg IntraVENous Q30MIN PRN 2 mg at 01/11/22 2046    [Held by provider] dexmedeTOMidine in 0.9 % NaCl (PRECEDEX) 400 mcg/100 mL (4 mcg/mL) infusion soln  0.1-1.5 mcg/kg/hr IntraVENous TITRATE Stopped at 01/16/22 0855    atorvastatin (LIPITOR) tablet 40 mg  40 mg Oral DAILY 40 mg at 01/18/22 0839    sodium chloride (NS) flush 5-40 mL  5-40 mL IntraVENous Q8H 10 mL at 01/17/22 1351    sodium chloride (NS) flush 5-40 mL  5-40 mL IntraVENous PRN      acetaminophen (TYLENOL) tablet 650 mg  650 mg Oral Q6H  mg at 01/18/22 0425    Or    acetaminophen (TYLENOL) suppository 650 mg  650 mg Rectal Q6H PRN      polyethylene glycol (MIRALAX) packet 17 g  17 g Oral DAILY PRN      ondansetron (ZOFRAN ODT) tablet 4 mg  4 mg Oral Q8H PRN      Or    ondansetron (ZOFRAN) injection 4 mg  4 mg IntraVENous Q6H PRN      glucose chewable tablet 16 g  4 Tablet Oral PRN      dextrose (D50W) injection syrg 12.5-25 g  25-50 mL IntraVENous PRN 25 g at 01/10/22 0911    glucagon (GLUCAGEN) injection 1 mg  1 mg IntraMUSCular PRN      insulin lispro (HUMALOG) injection   SubCUTAneous Q6H 3 Units at 01/18/22 9169    lansoprazole (PREVACID) 3 mg/mL oral suspension 30 mg  30 mg Per NG tube ACB 30 mg at 01/18/22 0842    albumin human 25% (BUMINATE) solution 25 g  25 g IntraVENous PRN 25 g at 01/15/22 1126    aspirin tablet 325 mg  325 mg Oral DAILY 325 mg at 01/18/22 0839    clopidogreL (PLAVIX) tablet 75 mg  75 mg Oral DAILY 75 mg at 01/18/22 0839       Labs/Data:    Lab Results   Component Value Date/Time    WBC 9.2 01/18/2022 01:26 AM    HGB 10.5 (L) 01/18/2022 01:26 AM    HCT 32.3 (L) 01/18/2022 01:26 AM    PLATELET 065 62/02/8261 01:26 AM    MCV 94.4 01/18/2022 01:26 AM       Lab Results   Component Value Date/Time    Sodium 140 01/18/2022 01:26 AM    Potassium 3.9 01/18/2022 01:26 AM    Chloride 100 01/18/2022 01:26 AM    CO2 31 01/18/2022 01:26 AM Anion gap 9 01/18/2022 01:26 AM    Glucose 307 (H) 01/18/2022 01:26 AM    BUN 66 (H) 01/18/2022 01:26 AM    Creatinine 8.09 (H) 01/18/2022 01:26 AM    BUN/Creatinine ratio 8 (L) 01/18/2022 01:26 AM    GFR est AA 8 (L) 01/18/2022 01:26 AM    GFR est non-AA 7 (L) 01/18/2022 01:26 AM    Calcium 9.9 01/18/2022 01:26 AM       Wt Readings from Last 3 Encounters:   01/17/22 70.6 kg (155 lb 10.3 oz)   12/02/21 80 kg (176 lb 5.9 oz)   11/21/21 72.8 kg (160 lb 7.9 oz)         Intake/Output Summary (Last 24 hours) at 1/18/2022 1019  Last data filed at 1/18/2022 0800  Gross per 24 hour   Intake 1245 ml   Output    Net 1245 ml       Patient seen and examined. Chart reviewed. Labs, data and other pertinent notes reviewed in last 24 hrs.     PMH/SH/ reviewed and unchanged compared to H&P    Gilberto Villafuerte MD  [id number]

## 2022-01-18 NOTE — PROGRESS NOTES
Clinical Pharmacy Note: IV to PO Automatic Conversion  Please note: Breanna Todd medication(s) (lacosamide) has/have been changed from IV to PO (to be given via NG tube) based on the following critiera:    - Patient is tolerating oral medications  - Patient is tolerating a diet more advanced than clear liquids  - Patient is not requiring vasopressors    This IV to PO conversion is based on the P&[employer] approved automatic conversion policy for eligible patients. Please call with questions.

## 2022-01-18 NOTE — PROGRESS NOTES
Comprehensive Nutrition Assessment    Type and Reason for Visit: Reassess    Nutrition Recommendations/Plan:      Check phosphorus with AM labs    Continue tube feeding    Add bowel regimen: Pericolace and Miralax once daily    Nutrition Assessment:    Pt admitted with Respiratory failure. PMHx: DM 2, CVA, HTN, HLD, CAD, ESRD, B/L BKA. Ischemic CVA-unable to do MRI d/t incidental finding COVID +. Intubated in ED for airway protection; on minimal vent settings. Acute toxic/metabolic encephalopathy-neurology following. Seizures ruled out. No improvement in the past week. Palliative Care consulted to assist family with [address]. Mr Clem Byers is now off isolation precautions and performed NFPE (see below). Pt meets criteria for severe malnutrition. Blood glucose trending up into the 300's-Lantus increased today. Phosphorus BNL 1/14 and 1/15 and replacement ordered. Recommend adding phosphorus to AM labs tomorrow as a follow-up. Tube feeding as ordered provides 990 ml, 1795 calories, 93 gm protein and 1070 ml free water (tube feeding/flush) per day to meet estimated needs. Last BM 1/14. Recommend adding bowel regimen. Malnutrition Assessment:  Malnutrition Status:  Severe malnutrition    Context:  Acute illness     Findings of the 6 clinical characteristics of malnutrition:   Energy Intake:  Unable to assess  Weight Loss:  No significant weight loss     Body Fat Loss:  7 - Moderate body fat loss, Fat overlying ribs   Muscle Mass Loss:  7 - Moderate muscle mass loss, Clavicles (pectoralis & deltoids),Temples (temporalis)  Fluid Accumulation:  No significant fluid accumulation,     Strength:  Not performed     Nutritionally Significant Medications:   Lipitor, Lantus, Humalog, Prevacid    Estimated Daily Nutrient Needs:  Energy (kcal): 1775 (25 kcal/kg); Weight Used for Energy Requirements: Current  Protein (g): 90-98 (1.2-1.3g/kg);  Weight Used for Protein Requirements: Current  Fluid (ml/day): Method Used for Fluid Requirements: Standard renal    Nutrition Related Findings:       BM: 1/14  Edema: None  Wounds:  None       Current Nutrition Therapies:   Diet: NPO  Tube Feeding: Nepro @ 45 ml/hr with 50 ml water flush q 4 hr and 1 packet Prosource daily  Additional Caloric Sources:  None     Anthropometric Measures:  · Height:  6' 3\" (190.5 cm)  · Current Body Wt:  70.6 kg (155 lb 10.3 oz)      · Adjusted Body Weight:  174; Weight Adjustment for: Amputation   · Adjusted BMI:  21.8    · BMI Categories:  Underweight (BMI less than 22) age over 72     Wt Readings from Last 10 Encounters:   01/17/22 70.6 kg (155 lb 10.3 oz)   12/02/21 80 kg (176 lb 5.9 oz)   11/21/21 72.8 kg (160 lb 7.9 oz)   08/07/10 98 kg (216 lb)       Nutrition Diagnosis:   · Inadequate energy intake related to cognitive or neurological impairment,impaired respiratory function as evidenced by intubation,nutrition support-enteral nutrition. Nutrition Interventions:   Food and/or Nutrient Delivery: Continue tube feeding  Nutrition Education and Counseling: No recommendations at this time  Coordination of Nutrition Care: Continue to monitor while inpatient,Interdisciplinary rounds    Goals:  EN to meet at least 90% estimated needs x 5-7 days. Nutrition Monitoring and Evaluation:   Behavioral-Environmental Outcomes: None identified  Food/Nutrient Intake Outcomes: Enteral nutrition intake/tolerance  Physical Signs/Symptoms Outcomes: Biochemical data,Weight,GI status    Discharge Planning:     Too soon to determine     Lev German RD CNSC  Contact: [employer]

## 2022-01-18 NOTE — ACP (ADVANCE CARE PLANNING)
Advance Care Planning      Hx obtained from sister Shawn. Pt  x 30 years, but still legally . Wife has not been part of pt's life since separation. Pt has 2 sons- they have been out of contact for a few years. Pt has 7 living siblings incl sisters Shawn and Jordy Mcleod who have been points of contact this admission. Pt w/out AMD on file and family does not know if he has one.      Reema got pt's cell phone from the NH and is looking for phone #s now. Addendum 1/19: Legally  Wife Kelly Pro now involved and would like to stay as decision maker. She is working w/ other family and now in close communication w/ them. Plan now is DNR status but to have family meeting time TBD.        Primary Decision Maker: Bridget Hankins Sister - [phone]    Primary Decision Maker: Reuben [place] - Spouse - [phone]

## 2022-01-19 NOTE — PROGRESS NOTES
[facility]    ICU TEAM Progress Note    Name: Akil Luciano   : 1955   MRN: [id number]   Date: 2022      I  Subjective:   Progress Note: 2022      Reason for ICU Admission: Altered mental status, respiratory failure, COVID-19 infection     Interval history: From critical care H&P on   The patient is a 65-year-old male with a past medical history of diabetes, depression, stroke with left-sided deficit and dysarthria, hypertension, hyperlipidemia, CAD, end-stage renal disease, and BKA who presented to the emergency department having fallen out of bed. Ronda Fernandez continued to have altered mental status and was stroke alerted upon arrival to the emergency department. Ronda Fernandez was seen and evaluated by neurology and deemed not a candidate for tPA. Norvel Favors showed occluded cervical right vertebral artery with mild distal reconstitution in the distal occlusion at the V4 segment.  Patient was given Plavix.  Unfortunately patient had ongoing altered mental status and was therefore intubated by emergency medicine for airway protection. Patient was subsequently found to be Levorn Means will now be admitted to the ICU for further work-up and management. Overnight Events:   : No acute event. Did better on SBT but still fatigued. Uncertain ability to protect airway though has good cough. Will attempt SBT again today  : No acute event. Failed SBT.  : No acute event, failed SBT on placed on pressure support due to increased work of breathing and decreased tidal volume.  Remained off Precedex.   : No acute event overnight.  Reportedly failing SBT due to apnea and neurological status       Active Problem List:     Problem List  Never Reviewed          Codes Class    Coronary artery disease involving native coronary artery of native heart without angina pectoris (Chronic) ICD-10-CM: I25.10  ICD-9-CM: 414.01     Overview Signed 2021 12:29 PM by Austyn Quan MD     Cath from [facility]  6/20/17 for abnormal stress test  LM ok  LAD  prox with benny from RCA and distal LAD occluded  D1 prox 70  D2 prox 90  Circ pMLI  OM1 50%   RCA large, mid 40             NSTEMI (non-ST elevated myocardial infarction) ([facility] [address] ICD-10-CM: I21.4  ICD-9-CM: 410.70     Overview Signed 11/18/2021 12:30 PM by Thom Marte MD     Peak troponin 2136 11/17/21  Hx of LAD  in 2017  EF 11/17/21 25%             Systolic CHF, acute on chronic [facility]) ICD-10-CM: I50.23  ICD-9-CM: 428.23, 428.0     Overview Signed 11/18/2021 12:31 PM by Tohm Marte MD     11/16/21    ECHO ADULT COMPLETE 11/18/2021 11/18/2021    Interpretation Summary  · LV: Estimated LVEF is 20 - 25%. Normal wall thickness. Mildly dilated left ventricle. Moderate-to-severely and segmentally reduced systolic function. Severe hypokinesis of the basal anterolateral, mid anterolateral and apical lateral wall(s). Dyskinesis of the basal inferoseptal and mid anteroseptal wall(s). · AV: Severe aortic valve focal thickening present. Moderate aortic valve sclerosis with no evidence of reduced excursion. Aortic valve leaflet calcification present. · MV: Mitral valve thickening. Mitral valve leaflet calcification without reduced excursion. Mild to moderate mitral valve regurgitation is present. · TV: Mild tricuspid valve regurgitation is present. · LA: Mildly dilated left atrium.     Signed by: Thom Marte MD on 11/18/2021 12:08 AM                Respiratory failure (HCC) ICD-10-CM: J96.90  ICD-9-CM: 518.81         Anemia in chronic kidney disease ICD-10-CM: N18.9, D63.1  ICD-9-CM: 285.21         Diabetes mellitus due to underlying condition with diabetic nephropathy ([facility] [address] ICD-10-CM: E08.21  ICD-9-CM: 249.40, 583.81         End stage renal disease (HCC) ICD-10-CM: N18.6  ICD-9-CM: 585.6               Past Medical History:      has a past medical history of Anemia, Coronary artery disease involving native coronary artery of native heart without angina pectoris (11/18/2021), Depression, Diabetes ([facility] [address] Dysphagia, GERD (gastroesophageal reflux disease), Hyperlipidemia, Hypertension, Nausea & vomiting, and Stroke ([facility] [address] (5/16/2003). Past Surgical History:      has a past surgical history that includes hx orthopaedic (7/20/2010); hx orthopaedic; hx amputation foot (Bilateral); upper gi endoscopy,biopsy (11/29/2021); and upper gi endoscopy,diagnosis (11/29/2021). Home Medications:     Prior to Admission medications    Medication Sig Start Date End Date Taking? Authorizing Provider   hydrALAZINE (APRESOLINE) 25 mg tablet Take 25 mg by mouth four (4) days a week. Receives 25mg in morning and 25mg in evening on Mon/Wed/Fri/Sun   Yes Provider, Historical   hydrALAZINE (APRESOLINE) 25 mg tablet Take 25 mg by mouth four (4) days a week. Receives 25mg in morning and 25mg in evening on Mon/Wed/Fri/Sun   Yes Provider, Historical   carvediloL (COREG) 6.25 mg tablet Take 1 Tablet by mouth two (2) times daily (with meals). 11/21/21  Yes Neftali Bailey MD   isosorbide dinitrate (ISORDIL) 10 mg tablet Take 1 Tablet by mouth three (3) times daily. 11/21/21  Yes Neftali Bailey MD   sevelamer carbonate (Renvela) 0.8 gram pwpk oral powder Take 0.8 g by mouth three (3) times daily (with meals). 5/19/21  Yes Libertad Watt MD   atorvastatin (LIPITOR) 40 mg tablet Take 40 mg by mouth nightly. 11/1/21  Yes Shukri, MD Libertad   Combigan 0.2-0.5 % drop ophthalmic solution Administer 1 Drop to left eye every twelve (12) hours. Glaucoma 11/1/21  Yes Libertad Watt MD   chlorproMAZINE (THORAZINE) 50 mg tablet 50 mg three (3) times daily. 11/8/21  Yes Libertad Watt MD   clopidogreL (PLAVIX) 75 mg tab Take 75 mg by mouth daily. 11/7/21  Yes Libertad Watt MD   gabapentin (NEURONTIN) 100 mg capsule 100 mg nightly. 10/26/21  Yes Libertad Watt MD   insulin lispro (HUMALOG) 100 unit/mL injection by SubCUTAneous route Before breakfast, lunch, dinner and at bedtime.  Inject per sliding scale, 0-200= 0; 201-250= 2 units; 251-300= 4 units; 301-350= 6 units; 351-400= 8 units; 401-999= 10 units. Greater than or equal to 401, give 10 units and CALL MD/NP, subcutaneously before meals and at bedtime for DM. 11/15/21  Yes Other, MD Libertad   latanoprost (XALATAN) 0.005 % ophthalmic solution Administer 1 Drop to left eye nightly. Unspecified Glaucoma 10/26/21  Yes Other, MD Libertad   omeprazole (PRILOSEC) 40 mg capsule Take 40 mg by mouth daily. 11/1/21  Yes Other, MD Libertad   sertraline (ZOLOFT) 50 mg tablet Take 50 mg by mouth daily. 11/1/21  Yes Other, MD Libertad   tamsulosin (FLOMAX) 0.4 mg capsule Take 0.4 mg by mouth nightly. 11/1/21  Yes Other, MD Libertad   Doxepin 3 mg tab Take 3 mg by mouth nightly. Yes Other, MD Libertad   multivitamin (ONE A DAY) tablet Take 1 Tablet by mouth daily. Yes Other, MD Libertad   senna (Senna) 8.6 mg tablet Take 1 Tablet by mouth nightly. Yes Other, MD Libertad   sodium chloride (OCEAN) 0.65 % nasal squeeze bottle 1 [place] by Both Nostrils route three (3) times daily as needed for Congestion. Yes Other, MD Libertad   acetaminophen (TylenoL) 325 mg tablet Take 650 mg by mouth every four (4) hours as needed for Pain. Yes Other, MD Libertad   ergocalciferol (Vitamin D2) 1,250 mcg (50,000 unit) capsule Take 50,000 Units by mouth every seven (7) days. EVERY Monday. Yes Other, MD Libertad   zinc sulfate (ZINCATE) 50 mg zinc (220 mg) capsule Take 1 Capsule by mouth daily. Yes Other, MD Libertad   aspirin 81 mg tablet Take 81 mg by mouth daily. Yes Other, MD Libertad   Glutose-15 40 % oral gel  10/21/21   Other, MD Libertad   ascorbic acid, vitamin C, (Vitamin C) 500 mg tablet Take 500 mg by mouth two (2) times a day. Other, MD Libertad       Allergies/Social/Family History:      Allergies   Allergen Reactions    Adhesive Tape-Silicones Rash    Trazodone Unknown (comments)     Listed in transfer paper 01/08/22      Social History     Tobacco Use    Smoking status: Never Smoker    Smokeless tobacco: Not on file   Substance Use Topics    Alcohol use: No      No family history on file. Review of Systems:     Not able to obtain due to patient medical condition    Objective:   Vital Signs:  Visit Vitals  BP 93/62   Pulse 82   Temp 98.5 °F (36.9 °C)   Resp 13   Ht 6' 3\" (1.905 m)   Wt 70.6 kg (155 lb 10.3 oz)   SpO2 100%   BMI 19.45 kg/m²      O2 Device: Ventilator,Endotracheal tube Temp (24hrs), Av °F (36.7 °C), Min:96.6 °F (35.9 °C), Max:98.9 °F (37.2 °C)           Intake/Output:     Intake/Output Summary (Last 24 hours) at 2022 0740  Last data filed at 2022 0600  Gross per 24 hour   Intake 1821 ml   Output    Net 1821 ml       Physical Exam:    Elderly male in bed in [facility]. CV RRR  Pulm No rales appreciated  GI Abd soft  Skin warm, dryElderly male in bed in [facility]. Patient is intubated, does not open eyes spontaneously, right pupil is not reactive. Could not appreciate facial asymmetry. Left chronic hemiplegia with contraction, occasional movement spontaneously on right side but does not seems to be purposeful. LABS AND  DATA: Personally reviewed  Recent Labs     22  0349 22  0126   WBC 9.5 9.2   HGB 10.7* 10.5*   HCT 31.8* 32.3*    238     Recent Labs     22  0349 22  0126   * 140   K 4.1 3.9   CL 97 100   CO2 29 31   BUN 46* 66*   CREA 4.91* 8.09*   * 307*   CA 9.6 9.9   PHOS 1.6*  --      No results for input(s): AP, TBIL, TP, ALB, GLOB, AML, LPSE in the last 72 hours. No lab exists for component: SGOT, GPT, AMYP  No results for input(s): INR, PTP, APTT, INREXT in the last 72 hours. No results for input(s): PHI, PCO2I, PO2I, FIO2I in the last 72 hours. No results for input(s): CPK, CKMB, TROIQ, BNPP in the last 72 hours.     Hemodynamics:   PAP:   CO:     Wedge:   CI:     CVP:    SVR:       PVR:       Ventilator Settings:  Mode Rate Tidal Volume Pressure FiO2 PEEP   SIMV   600 ml  12 cm H2O 40 % 5 cm H20     Peak airway pressure: 18 cm H2O Minute ventilation: 20 l/min        MEDS: Reviewed    Chest X-Ray:  CXR Results  (Last 48 hours)    None            Assessment and Plan:   Acute encephalopathy:   End-stage renal disease on hemodialysis:   Previous stroke with chronic left hemiplegia and other neurological deficits:   COVID-19 infection  Chronic congestive heart failure with ejection fraction 15-20%:  Respiratory failure: Multifactorial including COVID-19 infection and neurological status affecting ability to protect airway     1. Neurological System  EEG demonstrated diffuse slowing, repeated EEG result pending  CT head with no change  ASA and Plavix, Appreciate neurology     2. Cardiovascular System  BP improved  Pt has EF of 20-25 percent, start low-dose beta-blocker and ACE inhibitor     3. Respiratory System  Wean FiO2 for pulse ox goal greater than 88%  SAT, SBT  On minimal settings but failing SBT.    I believe his neurological status significantly precluding extubation.  Try SBT again.  Appreciate palliative care input regarding goals of care.  May need a tracheostomy.     4. Renal/GI/Endocrine System  Appreciate nephrology, HD as scheduled     5. Infectious Disease  COVID-19.  Minimal oxygen requirement, no lung infiltrate on x-ray.  No fever.  Positive on January 8.  I believe it is reasonable to discontinue droplet plus isolation and keep him on droplet isolation.     DVT and GI prophylaxis, ventilator bundle.     Prognosis is poor. I appreciate palliative care input    DISPOSITION  Stay in ICU    CRITICAL CARE CONSULTANT NOTE  I had a face to face encounter with the patient, reviewed and interpreted patient data including clinical events, labs, images, vital signs, I/O's, and examined patient.   I have discussed the case and the plan and management of the patient's care with the consulting services, the bedside nurses and the respiratory therapist.      NOTE OF PERSONAL INVOLVEMENT IN CARE   This patient has a high probability of imminent, clinically significant deterioration, which requires the highest level of preparedness to intervene urgently. I participated in the decision-making and personally managed or directed the management of the following life and organ supporting interventions that required my frequent assessment to treat or prevent imminent deterioration. I personally spent 40 minutes of critical care time. This is time spent at this critically ill patient's bedside actively involved in patient care as well as the coordination of care and discussions with the patient's family. This does not include any procedural time which has been billed separately. Neelam Bustamante M.D.   Staff Intensivist/Pulmonologist  [facility]  1/19/2022

## 2022-01-19 NOTE — PROGRESS NOTES
0730: Bedside and Verbal shift change report given to Erica LOPEZ RN  (oncoming nurse) by Ryne Peoples (offgoing nurse). Report included the following information SBAR, Kardex, Intake/Output, MAR, Recent Results and Cardiac Rhythm NSR.

## 2022-01-19 NOTE — PROGRESS NOTES
01/19/22 0802   Weaning Parameters   Spontaneous Breathing Trial Complete Yes  (8/5 per MD)   Resp Rate Observed 28   Ve 12.5      RSBI 70   currently on SBT

## 2022-01-19 NOTE — WOUND CARE
WOCN Note:      New wound care consult placed for buttock wound  Assessed in room 7112     Chart shows:  Patient admitted on 1/8/22 with Respiratory failure, Covid 19  Past Medical History:   Diagnosis Date    Anemia     Coronary artery disease involving native coronary artery of native heart without angina pectoris 11/18/2021    Cath from [facility] 6/20/17 for abnormal stress test LM ok LAD  prox with benny from RCA and distal LAD occluded D1 prox 70 D2 prox 90 Circ pMLI OM1 50%  RCA large, mid 40    Depression     Diabetes ([facility] [address]     Dysphagia     GERD (gastroesophageal reflux disease)     Hyperlipidemia     Hypertension     Nausea & vomiting     Stroke ([facility] [address] 5/16/2003     Assessment:   Intubated and sedated  Mobility: total assistance with repositioning  Continence: incontinent of stool, anuric  Last Laureano Score: 12  Surface: Leidy in-touch mattress     Bilateral BKA       1. MASD to buttocks with partial thickness skin breakdown to right buttock measuring 1.5 x 0.6 cm. No exudate. Improving since last assessment     Wound Recommendations:       Continue  Cleanse buttocks with Remedy foaming cleanser, pat dry, Apply Desitin Cream  to buttocks and sacrum every 8 hours and as needed with incontinence care    PI Prevention:  Turn/reposition approximately every 2 hours  Pad bony prominences   Keep HOB 30 degrees or less to decrease shearing and pressure unless medically contraindicated.  If HOB is to be over 30 degrees, raise knees first then [facility] to prevent sliding   Minimize layers of linen/pads under patient to optimize support surface to one flat sheet and one incontinence pad      Discussed with RN      Transition of Care: Plan to follow weekly and as needed while admitted to hospital     Dulce MAX, RN, [facility]  Certified Wound and Ostomy Nurse  office [phone]  Can be reached through [address] [place] [address]  pager [phone] or call  to page

## 2022-01-19 NOTE — PROGRESS NOTES
Name: Jo Ramirez   MRN: [id number]  : 1955        Assessment:                                    Plan:  ESRD-T//S--FMC/MCV  Intubated  Low K/Phos  COVID (+)  CVA  (B) amputee  DM  Occluded (R) vertebral artery  CMO ef 20-25%  Anemia Next HD tomorrow    UF of 1-2 Kg as benito    +Hypercalcemia noted-> lower CA HD bath ordered  HYpophos noted. Should correct with TF. Ct WARREN    Unsuccessful SBT  Neurology following to assess encephalopathy/seizure like activity. Palliative care on board. D/W ICU RN           Subjective:  Patient seen and examined.  No changes clinically per RN  D/W RN      ROS:   Deferred    Exam:  Visit Vitals  /67   Pulse 88   Temp 100.1 °F (37.8 °C)   Resp 13   Ht 6' 3\" (1.905 m)   Wt 70.6 kg (155 lb 10.3 oz)   SpO2 100%   BMI 19.45 kg/m²     Intubated  +ETT  +NGT  B/l BKA      Current Facility-Administered Medications   Medication Dose Route Frequency Last Admin    insulin glargine (LANTUS) injection 25 Units  25 Units SubCUTAneous Q12H      sodium phosphate 20 mmol in 0.9% sodium chloride 250 mL infusion   IntraVENous ONCE      lisinopriL (PRINIVIL, ZESTRIL) tablet 5 mg  5 mg Oral DAILY 5 mg at 22 0839    insulin lispro (HUMALOG) injection   SubCUTAneous Q6H 5 Units at 22 1205    polyethylene glycol (MIRALAX) packet 17 g  17 g Per NG tube DAILY 17 g at 22 1019    senna-docusate (PERICOLACE) 8.6-50 mg per tablet 1 Tablet  1 Tablet Per NG tube DAILY 1 Tablet at 22 1019    lacosamide (VIMPAT) tablet 100 mg  100 mg Per NG tube Q12H 100 mg at 22 1019    carvediloL (COREG) tablet 6.25 mg  6.25 mg Oral BID WITH MEALS 6.25 mg at 22 0839    isosorbide dinitrate (ISORDIL) tablet 10 mg  10 mg Oral TID 10 mg at 22 2229    guaiFENesin (ROBITUSSIN) 100 mg/5 mL oral liquid 100 mg  100 mg Oral Q4H PRN 100 mg at 01/18/22 1835    albuterol (PROVENTIL VENTOLIN) nebulizer solution 2.5 mg  2.5 mg Nebulization Q4H PRN      zinc oxide-cod liver oil (DESITIN) 40 % paste   Topical Q8H Given at 01/19/22 0602    chlorhexidine (ORAL CARE KIT) 0.12 % mouthwash 15 mL  15 mL Oral Q12H 15 mL at 01/18/22 2132    heparin (porcine) injection 5,000 Units  5,000 Units SubCUTAneous Q8H 5,000 Units at 01/19/22 0608    [Held by provider] dexmedeTOMidine in 0.9 % NaCl (PRECEDEX) 400 mcg/100 mL (4 mcg/mL) infusion soln  0.1-1.5 mcg/kg/hr IntraVENous TITRATE Stopped at 01/19/22 0732    atorvastatin (LIPITOR) tablet 40 mg  40 mg Oral DAILY 40 mg at 01/19/22 1019    sodium chloride (NS) flush 5-40 mL  5-40 mL IntraVENous Q8H 10 mL at 01/19/22 0601    sodium chloride (NS) flush 5-40 mL  5-40 mL IntraVENous PRN      acetaminophen (TYLENOL) tablet 650 mg  650 mg Oral Q6H  mg at 01/18/22 0425    Or    acetaminophen (TYLENOL) suppository 650 mg  650 mg Rectal Q6H PRN      ondansetron (ZOFRAN ODT) tablet 4 mg  4 mg Oral Q8H PRN      Or    ondansetron (ZOFRAN) injection 4 mg  4 mg IntraVENous Q6H PRN      glucose chewable tablet 16 g  4 Tablet Oral PRN      dextrose (D50W) injection syrg 12.5-25 g  25-50 mL IntraVENous PRN 25 g at 01/10/22 0911    glucagon (GLUCAGEN) injection 1 mg  1 mg IntraMUSCular PRN      lansoprazole (PREVACID) 3 mg/mL oral suspension 30 mg  30 mg Per NG tube ACB 30 mg at 01/19/22 1020    albumin human 25% (BUMINATE) solution 25 g  25 g IntraVENous PRN 25 g at 01/15/22 1126    aspirin tablet 325 mg  325 mg Oral DAILY 325 mg at 01/19/22 1019    clopidogreL (PLAVIX) tablet 75 mg  75 mg Oral DAILY 75 mg at 01/19/22 1019       Labs/Data:    Lab Results   Component Value Date/Time    WBC 9.5 01/19/2022 03:49 AM    HGB 10.7 (L) 01/19/2022 03:49 AM    HCT 31.8 (L) 01/19/2022 03:49 AM    PLATELET 455 40/11/1349 03:49 AM    MCV 96.4 01/19/2022 03:49 AM       Lab Results   Component Value Date/Time    Sodium 135 (L) 01/19/2022 03:49 AM    Potassium 4.1 01/19/2022 03:49 AM    Chloride 97 01/19/2022 03:49 AM    CO2 29 01/19/2022 03:49 AM    Anion gap 9 01/19/2022 03:49 AM    Glucose 274 (H) 01/19/2022 03:49 AM    BUN 46 (H) 01/19/2022 03:49 AM    Creatinine 4.91 (H) 01/19/2022 03:49 AM    BUN/Creatinine ratio 9 (L) 01/19/2022 03:49 AM    GFR est AA 14 (L) 01/19/2022 03:49 AM    GFR est non-AA 12 (L) 01/19/2022 03:49 AM    Calcium 9.6 01/19/2022 03:49 AM       Wt Readings from Last 3 Encounters:   01/17/22 70.6 kg (155 lb 10.3 oz)   12/02/21 80 kg (176 lb 5.9 oz)   11/21/21 72.8 kg (160 lb 7.9 oz)         Intake/Output Summary (Last 24 hours) at 1/19/2022 1245  Last data filed at 1/19/2022 0600  Gross per 24 hour   Intake 956 ml   Output    Net 956 ml       Patient seen and examined. Chart reviewed. Labs, data and other pertinent notes reviewed in last 24 hrs.     PMH/SH/FH reviewed and unchanged compared to H&P    Natalie Denson MD  [id number]

## 2022-01-19 NOTE — PROGRESS NOTES
Palliative Medicine  [place]: [phone]  [facility]: [phone])    The Palliative Medicine SW participated in phone call with Dr. Neel Helton along with the patient's sister Ibeth Riley, and the patient's spouse Perry Kelly ([phone]). Kevin Moore shares that she does want to be involved in the decision making process, family is talking together as well as far as the patient's care- they plan for a family conference call tonight. The patient is legally  to Kevin Moore, she is Marilin Carreon. Discussion held with family regarding how aggressive to care for patient moving forward. Encouraged family to think about what the patient would consider as good living, knowing how sick he is and his prognosis. Perry Kelly and Ibeth Riley are both agreeable for DNR in the event of a cardiac arrest, but continue restorative care at this time. Thank you for including Palliative Medicine in the care of Germán Cortez.       Justine Gutiérrez, JASON  [phone]

## 2022-01-19 NOTE — PROGRESS NOTES
1930: Bedside and Verbal shift change report given to Gigi Owen RN (oncoming nurse) by Chris Sherman (offgoing nurse). Report included the following information SBAR, Kardex, ED Summary, OR Summary, Procedure Summary, Intake/Output, MAR, Accordion, Recent Results, Med Rec Status and Cardiac Rhythm sinus rhythm. 0000:  Patient remains slightly tachycardic and tachypnic. Patient stacking breaths on the ventilator and generally looks uncomfortable. Per NP, restart precedex. 0730: Bedside and Verbal shift change report given to Erica DESOUZA (oncoming nurse) by Gigi Owen RN (offgoing nurse). Report included the following information SBAR, Kardex, ED Summary, OR Summary, Procedure Summary, Intake/Output, MAR, Accordion, Recent Results, Med Rec Status and Cardiac Rhythm sinus rhythm.

## 2022-01-20 NOTE — PROGRESS NOTES
Name: Renaldo Antony   MRN: [id number]  : 1955        Assessment:                                    Plan:  ESRD-T//S--FMC/MCV  Intubated  Low K/Phos  COVID (+)  CVA  (B) amputee  DM  Occluded (R) vertebral artery  CMO ef 20-25%  Anemia Next HD today    UF of 1-2 Kg as benito    +Hypercalcemia noted-> lower CA HD bath ordered  HYpophos noted. Should correct with TF. Ct WARREN    Unsuccessful SBT  Neurology following to assess encephalopathy/seizure like activity. Palliative care on board. Family to consider comfort measures    D/W ICU RN           Subjective:  Patient seen earlier on HD at 9:45am. BP elevated.  Patient having episodes of apnea/posturing  D/W HD RN      ROS:   Deferred    Exam:  Visit Vitals  BP (!) 54/30   Pulse (!) 40   Temp 97.1 °F (36.2 °C)   Resp 15   Ht 6' 3\" (1.905 m)   Wt 69.2 kg (152 lb 8.9 oz)   SpO2 96%   BMI 19.07 kg/m²     Intubated  +ETT  +NGT  B/l BKA      Current Facility-Administered Medications   Medication Dose Route Frequency Last Admin    EPINEPHrine (ADRENALIN) 0.1 mg/mL 0.1 mg/mL syringe          albumin human 5% (BUMINATE) 5 % solution          labetaloL (NORMODYNE;TRANDATE) injection 20 mg  20 mg IntraVENous Q4H PRN 20 mg at 22 1110    insulin glargine (LANTUS) injection 25 Units  25 Units SubCUTAneous Q12H 25 Units at 22 0952    chlorhexidine (PERIDEX) 0.12 % mouthwash 15 mL  15 mL Oral Q12H 15 mL at 22 0953    lisinopriL (PRINIVIL, ZESTRIL) tablet 5 mg  5 mg Oral DAILY 5 mg at 22 8416    insulin lispro (HUMALOG) injection   SubCUTAneous Q6H 2 Units at 22 1200    polyethylene glycol (MIRALAX) packet 17 g  17 g Per NG tube DAILY 17 g at 22 1019    senna-docusate (PERICOLACE) 8.6-50 mg per tablet 1 Tablet  1 Tablet Per NG tube DAILY 1 Tablet at 22 0952    lacosamide (VIMPAT) tablet 100 mg  100 mg Per NG tube Q12H 100 mg at 01/20/22 0952    carvediloL (COREG) tablet 6.25 mg  6.25 mg Oral BID WITH MEALS 6.25 mg at 01/20/22 0951    isosorbide dinitrate (ISORDIL) tablet 10 mg  10 mg Oral TID 10 mg at 01/20/22 0918    guaiFENesin (ROBITUSSIN) 100 mg/5 mL oral liquid 100 mg  100 mg Oral Q4H  mg at 01/18/22 1835    albuterol (PROVENTIL VENTOLIN) nebulizer solution 2.5 mg  2.5 mg Nebulization Q4H PRN      zinc oxide-cod liver oil (DESITIN) 40 % paste   Topical Q8H Given at 01/20/22 0512    heparin (porcine) injection 5,000 Units  5,000 Units SubCUTAneous Q8H 5,000 Units at 01/20/22 0536    [Held by provider] dexmedeTOMidine in 0.9 % NaCl (PRECEDEX) 400 mcg/100 mL (4 mcg/mL) infusion soln  0.1-1.5 mcg/kg/hr IntraVENous TITRATE Stopped at 01/19/22 0732    atorvastatin (LIPITOR) tablet 40 mg  40 mg Oral DAILY 40 mg at 01/20/22 3903    sodium chloride (NS) flush 5-40 mL  5-40 mL IntraVENous Q8H 10 mL at 01/20/22 0510    sodium chloride (NS) flush 5-40 mL  5-40 mL IntraVENous PRN      acetaminophen (TYLENOL) tablet 650 mg  650 mg Oral Q6H  mg at 01/18/22 0425    Or    acetaminophen (TYLENOL) suppository 650 mg  650 mg Rectal Q6H PRN      ondansetron (ZOFRAN ODT) tablet 4 mg  4 mg Oral Q8H PRN      Or    ondansetron (ZOFRAN) injection 4 mg  4 mg IntraVENous Q6H PRN      glucose chewable tablet 16 g  4 Tablet Oral PRN      dextrose (D50W) injection syrg 12.5-25 g  25-50 mL IntraVENous PRN 25 g at 01/10/22 0911    glucagon (GLUCAGEN) injection 1 mg  1 mg IntraMUSCular PRN      lansoprazole (PREVACID) 3 mg/mL oral suspension 30 mg  30 mg Per NG tube ACB 30 mg at 01/20/22 0951    albumin human 25% (BUMINATE) solution 25 g  25 g IntraVENous PRN 25 g at 01/15/22 1126    aspirin tablet 325 mg  325 mg Oral DAILY 325 mg at 01/20/22 0588    clopidogreL (PLAVIX) tablet 75 mg  75 mg Oral DAILY 75 mg at 01/20/22 8193       Labs/Data:    Lab Results   Component Value Date/Time WBC 9.6 01/20/2022 03:43 AM    HGB 10.5 (L) 01/20/2022 03:43 AM    HCT 30.3 (L) 01/20/2022 03:43 AM    PLATELET 919 48/18/5075 03:43 AM    MCV 94.4 01/20/2022 03:43 AM       Lab Results   Component Value Date/Time    Sodium 137 01/20/2022 03:43 AM    Potassium 3.6 01/20/2022 03:43 AM    Chloride 98 01/20/2022 03:43 AM    CO2 29 01/20/2022 03:43 AM    Anion gap 10 01/20/2022 03:43 AM    Glucose 109 (H) 01/20/2022 03:43 AM    BUN 81 (H) 01/20/2022 03:43 AM    Creatinine 6.80 (H) 01/20/2022 03:43 AM    BUN/Creatinine ratio 12 01/20/2022 03:43 AM    GFR est AA 10 (L) 01/20/2022 03:43 AM    GFR est non-AA 8 (L) 01/20/2022 03:43 AM    Calcium 10.3 (H) 01/20/2022 03:43 AM       Wt Readings from Last 3 Encounters:   01/19/22 69.2 kg (152 lb 8.9 oz)   12/02/21 80 kg (176 lb 5.9 oz)   11/21/21 72.8 kg (160 lb 7.9 oz)         Intake/Output Summary (Last 24 hours) at 1/20/2022 1232  Last data filed at 1/20/2022 1226  Gross per 24 hour   Intake 1835 ml   Output 1516 ml   Net 319 ml       Patient seen and examined. Chart reviewed. Labs, data and other pertinent notes reviewed in last 24 hrs.     PMH/SH/FH reviewed and unchanged compared to H&P    Aristeo Camacho MD  [id number]

## 2022-01-20 NOTE — PROGRESS NOTES
1930: Bedside and Verbal shift change report given to Luiz Sorto RN (oncoming nurse) by Chikis Guzmán RN (offgoing nurse). Report included the following information SBAR, Kardex, ED Summary, OR Summary, Procedure Summary, Intake/Output, MAR, Accordion, Recent Results, Med Rec Status and Cardiac Rhythm sinus rhythm. 2000:  Assumed care of patient. Patient does not open eyes, withdraws x4, moves L arm spontaneously. VSS, vented, TF running. Patient's , Summer Miles from his home, [facility] is at the bedside. She discussed what patient was like prior to admission and his likes. She was questioning patient's EEG results from Monday, 1/17/22. Advised her she will need to discuss with NP/MD.     2323:  Called RT to assist at bedside. Patient continues to pop off the ventilator, tube has advanced 2 cm, and tube appears to have thick secretions in it.     0022:  Patient still popping off the vent with numerous secretions. NP at bedside. Called RT. She is on her way. MD at bedside. 8468: ETT exchanged. 50 mg propofol pushed by MD.    0500: patient consistently fighting the vent and not pulling volumes. Patient lavaged and suctioned with no help. MD at bedside. MD adjusted vent settings. Versed ordered and given. RT called for assistance. 0530: patient remains asynchronus with the vent, not pulling volumes, high peak pressures, and hypertensive. NP at bedside. Fentanyl ordered for sedation. NP adjusting vent settings. RT called. No answer. 0545: RT at bedside. RT adjusted vent settings and changed HME. Patient now compliant with the ventilator. BP beginning to trend down. Will monitor BP.    0730: Bedside and Verbal shift change report given to [address] [place] [address] Avenue (oncoming nurse) by Luiz Sorto RN (offgoing nurse). Report included the following information SBAR, Kardex, ED Summary, OR Summary, Procedure Summary, Intake/Output, MAR, Accordion, Recent Results, Med Rec Status and Cardiac Rhythm sinus rhythm.

## 2022-01-20 NOTE — PROGRESS NOTES
745-Bedside and Verbal shift change report given to [facility] (oncoming nurse) by Chiqui Cheng (offgoing nurse). Report included the following information SBAR, Kardex, ED Summary, Intake/Output, MAR, Accordion, Recent Results, Med Rec Status, Cardiac Rhythm SR/ST, Alarm Parameters , Pre Procedure Checklist, Procedure Verification, Quality Measures and Dual Neuro Assessment. 0800- Assessment completed. Pt is unresponsive, does not open eyes, moves right arm spontaneously. 0830- Pt started on HD    0900- SBP in 190's, informed Dr. Enrrique Bhardwaj, new orders for PRN meds for SBP >180.    1110- PRN labetalol given per Emily. 1200- Pt SBP dropping, Emily aware, will continue to monitor. 1224- Pt cherry in 30's, ringing apnea on vent. 26- Dr. Enrrique Bhardwaj at bedside along with primary RN and denzel RN. Pt being rinsed back from HD.     1235- Pt pronounced per Emily. [facility] called and informed pt's sister of passing.

## 2022-01-20 NOTE — PROGRESS NOTES
Spiritual Care Assessment/Progress Note  [facility]      NAME: Clayton Puente      MRN: [id number]  AGE: 78 y.o. SEX: male  Worship Affiliation: Non Confucianist   Language: English     1/20/2022     Total Time (in minutes): 7     Spiritual Assessment begun [place] [address] through conversation with:         []Patient        [] Family    [] Friend(s)        Reason for Consult: Palliative Care, Initial/Spiritual Assessment     Spiritual beliefs: (Please include comment if needed)     [] Identifies with a ollie tradition:         [] Supported by a ollie community:            [] Claims no spiritual orientation:           [] Seeking spiritual identity:                [] Adheres to an individual form of spirituality:           [x] Not able to assess:                           Identified resources for coping:      [] Prayer                               [] Music                  [] Guided Imagery     [] Family/friends                 [] Pet visits     [] Devotional reading                         [x] Unknown     [] Other:                                              Interventions offered during this visit: (See comments for more details)                Plan of Care:     [] Support spiritual and/or cultural needs    [] Support AMD and/or advance care planning process      [] Support grieving process   [] Coordinate Rites and/or Rituals    [] Coordination with community clergy   [] No spiritual needs identified at this time   [] Detailed Plan of Care below (See Comments)  [] Make referral to Music Therapy  [] Make referral to Pet Therapy     [] Make referral to Addiction services  [] Make referral to [facility]  [] Make referral to Spiritual Care Partner  [] No future visits requested        [x] Contact Spiritual Care for further referrals     Comments: Attempted Initial Spiritual Assessment for this pt in [place] [address]. Reviewed pt's chart prior to this visit.   Pt was unable to be assessed at this time.  No family/friends present at time of visit. Contact Spiritual Care Services for any spiritual or emotional support needs. Nancy Wells MDiv.  Staff   Request  Support/Spiritual Care Services on [phone])

## 2022-01-20 NOTE — PROCEDURES
01/20/22 0905   During Hemodialysis    Temp 99.9 °F (37.7 °C)   Temp source Axillary   Pulse (Heart Rate) 100   Resp Rate 11   O2 Sat (%) 100 %   BP (!) 196/100   MAP (Calculated) 132   Transducer Checks Dry   Saline Given (mL) 200 mL   Heparin Bolus (units) 0 units   Continuous Heparin Infusion (Units/hr) 0 Units/hr   Blood Flow Rate (ml/min) 450 ml/min   Dialysate Flow Rate (ml/hr) 600 ml/hr   Arterial Access Pressure (mmHg) -220   Venous Return Pressure (mmHg) 200   Transmembrane Pressure (mmHg) 40 mmHg   Ultrafiltration Rate (ml/hr) 750 ml/hr   Fluid Removed (mL) 0   $$ Dialysis Charges   $$ Method Hemodialysis      01/20/22 1226   During Hemodialysis    Pulse (Heart Rate) (!) 40   Resp Rate 15   BP (!) 39/29   MAP (Calculated) (!) 32   Fluid Removed (mL) 2015   NET Fluid Removed (mL) 1515 ml   Post-Dialysis   Rinseback Volume (ml) 500 ml   Duration of Treatment (hours) 3.25 hours   Patient Response to Treatment Poor   Condition of Dialyzer Filter Poor   Hemodialysis End Time 1226     Orders   Duration: Start: 9307 End: 1226 Total: 4   Dialyzer: Dialyzer/Set Up Inspection: Jasper Goff (01/20/22 0845)   K Bath: Dialysate K (mEq/L): 4 (01/20/22 0845)   Ca Bath: Dialysate CA (mEq/L): 2.0 (01/20/22 0845)   Na: Dialysate NA (mEq/L): 138 (01/20/22 0845)   Bicarb: Dialysate HCO3 (mEq/L): 35 (01/20/22 0845)   Target Fluid Removal: Goal/Amount of Fluid to Remove (mL): 2000 mL (01/20/22 0845)     Access   Type & Location: RUE AVF   Comments:  +BRUIT/THRILL, NOTED SCABBING TO AVF THAT APPEARS TO BE HEALING WELL.  ACCESSED W/ 2 15G 1IN NEEDLES W/O ISSUES, GOOD FLOW TO BOTH LINES     Labs      Lab Results   Component Value Date/Time    Hepatitis B surface Ag <0.10 01/08/2022 06:28 PM    Hepatitis B surface Ab 76.73 01/08/2022 06:28 PM      Obtained/Reviewed  Critical Results Called HGB   Date Value Ref Range Status   01/20/2022 10.5 (L) 12.1 - 17.0 g/dL Final     Potassium   Date Value Ref Range Status   01/20/2022 3.6 3.5 - 5.1 mmol/L Final     Calcium   Date Value Ref Range Status   01/20/2022 10.3 (H) 8.5 - 10.1 MG/DL Final     BUN   Date Value Ref Range Status   01/20/2022 81 (H) 6 - 20 MG/DL Final     Comment:     INVESTIGATED PER DELTA CHECK PROTOCOL     Creatinine   Date Value Ref Range Status   01/20/2022 6.80 (H) 0.70 - 1.30 MG/DL Final     Comment:     INVESTIGATED PER DELTA CHECK PROTOCOL        Meds Given     Adequacy / Fluid    Total Liters Process: 63.2      Comments   Time Out Done:   (Time) 0900   Admitting Diagnosis: RESPIRATORY FAILURE   Consent obtained/signed: Informed Consent Verified: Yes (01/20/22 0845)   Machine / RO # Machine Number: [id number] (01/20/22 0845)   Primary Nurse Rpt Pre:    Primary Nurse Rpt Post:    Pt Education: INAPPROPRIATE D/T PT CONDITION      Tx Summary   Comments: @6302 TX STARTED W/O ISSUES, GOOD FLOW TO EACH LIMB. PT IS NON-RESPONSIVE, DOES RETRACT TO PAIN. PT MOVING RUE @ TIMES INCREASING AP/.    @1145 BP dropping d/t medication administration. UF off, BFR down. @0102 contacted Dr. Megan Noriega to update on pt status, left voicemail    @6228 messaged Dr. Megan Noriega r/t /76 yet HR falling into low 50s. Order to continue tx w/ no further fluid removal.          @1222 BP reading 40/30 w/ HR in 50s.   @1224 pt blood returned and bolus of NS give. @2580 Dr. Megan Noriega made aware of tx suspension and pt status.    Pt pronounced dead @ [phone], Dr. Megan Noriega made aware

## 2022-01-20 NOTE — PROGRESS NOTES
Palliative Medicine Consult  [place]: [phone] ([phone])    Patient Name: Stuart Kevin  YOB: 1955    Date of Initial Consult: 1/17/22  Reason for Consult: Care decisions   Requesting Provider: Dennis Salas  Primary Care Physician: Peg Landaverde MD     SUMMARY:   Stuart Kevin is a 78 y.o. with a past history of ESRD on HD for several years, NSTEMI 11/2021 w/ acute CHF (EF 20-25%), BKA, CVA, DM who was admitted on 1/8/2022 from [facility] after falling out of bed, then later on being found unresponsive. Head CT w/out cute findings- CTA showing extensive verterbrobasilar disease and occluded R vertebral artery. Intubated in ED for airway protection. Neuro consulted, as CTA findings not explaining pt being unresponsive. MRI brain w/ extensive old ischemic disease. EEG 1/10/22 did not show seizures, but later on having possible seizure activity. Repeat EEG 1/14 also w/out seizures but suggestive of severe generalized encephalopathic process. Also w/ COVID 19 and now w/ EF of 15-20%. Off sedation since 1/16 and not following any commands, will withdraw to pain. 1/19- Cont to fail SBTs, although did a bit better. 1/20- Cont to not follow commands, getting dialysis    Current medical issues leading to Palliative Medicine involvement include: care decisions. Social: Hx obtained from sister Shawn. Pt  x 30 years, but still legally . Wife has not been part of pt's life since separation. Pt has 2 sons- they have been out of contact for a few years. Pt has 7 living siblings incl sisters Shawn and Catie Schmitz who have been points of contact this admission. Pt w/out AMD on file and family does not know if he has one. PALLIATIVE DIAGNOSES:   1. Unresponsive not following commands w/ hx of CVA w/ MRI brain showing atrophy and extensive chronic white matter disease   2. Decline in health over past few years and catrachita over past 6 months  3. ESRD on HD  4.  Goals of care       PLAN:   1. See yesterday's family meeting- wife Nanette Maddox (info in demographics) legal NOK and she is working pt' pt's 6 siblings and pt's son for decisions. 2. Speak to point of contact, sister Shawn. Family has all been in discussion and currently everyone is leaning towards compassionate extubation, stopping dialysis and hospice care- pt would require IP hospice care I believe. However before final decision made, about 5 or 6 family members would like to come into the hospital to see pt.   3. Family would like to do this either Sat or Sun, discuss that my team is not physically here to lead a family meeting but I think that they all have received all the information they need to make a decision (I don't think another family meeting needed)- and the ICU provider and/or hospice RN for the weekend will be aware that if a decision made they can discuss next steps in more detail as needed. 4. I will ensure that comfort medications are ordered to utilize if comfort is the decisions. 5. ICU manager aware of 5-6 visitors, 3 at a time for visit this weekend to assist w/ care decisions. 6. Cont current care, maintain DNR status, family to come in this Sat or Sun to see pt and make care decisions. Family meeting not needed again, but ICU provider to be available to answer medical questions and hospice team aware if decision if for comfort. Family is leaning towards comfort. 7. Communicated plan of care with: Palliative IDT, [facility] Team- Discussed w/ Dr Soniya Lujan, Dr D Thomasine Gitelman RN and Daniel Keita RN w/ hospice      GOALS OF CARE / TREATMENT PREFERENCES:     GOALS OF CARE:  Patient/Health Care Proxy Stated Goals:  Other (comment) (TBD)    TREATMENT PREFERENCES:   Code Status: DNR        Advance Care Planning:  [x] [facility] Interdisciplinary Team has updated the ACP Navigator with Health Care Decision Maker and Patient Capacity      Primary Decision Maker: Jonny Costa - Sister - [phone]    Primary Decision Maker: Stephane Farr - Melonie - [phone]  Advance Care Planning 11/16/2021   Patient's Healthcare Decision Maker is: Legal Next of Kin   Confirm Advance Directive None   Patient Would Like to Complete Advance Directive Unable       Medical Interventions: Full interventions       Other:    As far as possible, the palliative care team has discussed with patient / health care proxy about goals of care / treatment preferences for patient. HISTORY:     History obtained from: chart, staff, family     CHIEF COMPLAINT: Cannot obtain due to patient factors      HPI/SUBJECTIVE:    The patient is:   [] Verbal and participatory  [x] Non-participatory due to: Not following commands on vent. Getting dialysis.      Clinical Pain Assessment (nonverbal scale for severity on nonverbal patients):   Clinical Pain Assessment  Severity: 0     Activity (Movement): Lying quietly, normal position    Duration: for how long has pt been experiencing pain (e.g., 2 days, 1 month, years)  Frequency: how often pain is an issue (e.g., several times per day, once every few days, constant)     FUNCTIONAL ASSESSMENT:     Palliative Performance Scale (PPS):  PPS: 10       PSYCHOSOCIAL/SPIRITUAL SCREENING:     Palliative IDT has assessed this patient for cultural preferences / practices and a referral made as appropriate to needs (Cultural Services, Patient Advocacy, Ethics, etc.)    Any spiritual / Congregation concerns:  [] Yes /  [x] No   If \"Yes\" to discuss with pastoral care during IDT     Does caregiver feel burdened by caring for their loved one:   [] Yes /  [x] No /  [] No Caregiver Present    If \"Yes\" to discuss with social work during IDT    Anticipatory grief assessment:   [x] Normal  / [] Maladaptive     If \"Maladaptive\" to discuss with social work during IDT    ESAS Anxiety:      ESAS Depression:       Cannot obtain due to patient factors     REVIEW OF SYSTEMS:     Positive and pertinent negative findings in ROS are noted above in HPI. The following systems were [x] reviewed / [] unable to be reviewed as noted in HPI  Other findings are noted below. Systems: constitutional, ears/nose/mouth/throat, respiratory, gastrointestinal, genitourinary, musculoskeletal, integumentary, neurologic, psychiatric, endocrine. Positive findings noted below. Modified ESAS Completed by: provider   Fatigue: 10 Drowsiness: 10     Pain: 0           Dyspnea: 0           Stool Occurrence(s): 1        PHYSICAL EXAM:     From RN flowsheet:  Wt Readings from Last 3 Encounters:   01/19/22 152 lb 8.9 oz (69.2 kg)   12/02/21 176 lb 5.9 oz (80 kg)   11/21/21 160 lb 7.9 oz (72.8 kg)     Blood pressure (!) 70/51, pulse 91, temperature 99.9 °F (37.7 °C), temperature source Axillary, resp. rate 17, height 6' 3\" (1.905 m), weight 152 lb 8.9 oz (69.2 kg), SpO2 96 %. Pain Scale 1: Adult Nonverbal Pain Scale  Pain Intensity 1: 0                 Last bowel movement, if known:     Constitutional: no sedation, not following any commands. Getting dialysis and moving his R arm nonpurposefully. HISTORY:     Active Problems:    Respiratory failure ([address] (11/16/2021)      Past Medical History:   Diagnosis Date    Anemia     Coronary artery disease involving native coronary artery of native heart without angina pectoris 11/18/2021    [facility]20[facility]17 for abnormal stress test LM ok LAD  prox with benny from RCA and distal LAD occluded D1 prox 70 D2 prox 90 Circ pMLI OM1 50%  RCA large, mid 36    Depression     Diabetes ([address]     Dysphagia     GERD (gastroesophageal reflux disease)     Hyperlipidemia     Hypertension     Nausea & vomiting     Stroke ([address] 5/16/2003      Past Surgical History:   Procedure Laterality Date    HX AMPUTATION FOOT Bilateral     HX ORTHOPAEDIC  7/20/2010    Bunion and toe repair left foot.  HX ORTHOPAEDIC      Plate in left hip.     UPPER GI ENDOSCOPY,BIOPSY  11/29/2021         UPPER GI ENDOSCOPY,DIAGNOSIS  11/29/2021           No family history on file. History reviewed, no pertinent family history.   Social History     Tobacco Use    Smoking status: Never Smoker    Smokeless tobacco: Not on file   Substance Use Topics    Alcohol use: No     Allergies   Allergen Reactions    Adhesive Tape-Silicones Rash    Trazodone Unknown (comments)     Listed in transfer paper 01/08/22      Current Facility-Administered Medications   Medication Dose Route Frequency    labetaloL (NORMODYNE;TRANDATE) injection 20 mg  20 mg IntraVENous Q4H PRN    insulin glargine (LANTUS) injection 25 Units  25 Units SubCUTAneous Q12H    chlorhexidine (PERIDEX) 0.12 % mouthwash 15 mL  15 mL Oral Q12H    lisinopriL (PRINIVIL, ZESTRIL) tablet 5 mg  5 mg Oral DAILY    insulin lispro (HUMALOG) injection   SubCUTAneous Q6H    polyethylene glycol (MIRALAX) packet 17 g  17 g Per NG tube DAILY    senna-docusate (PERICOLACE) 8.6-50 mg per tablet 1 Tablet  1 Tablet Per NG tube DAILY    lacosamide (VIMPAT) tablet 100 mg  100 mg Per NG tube Q12H    carvediloL (COREG) tablet 6.25 mg  6.25 mg Oral BID WITH MEALS    isosorbide dinitrate (ISORDIL) tablet 10 mg  10 mg Oral TID    guaiFENesin (ROBITUSSIN) 100 mg/5 mL oral liquid 100 mg  100 mg Oral Q4H PRN    albuterol (PROVENTIL VENTOLIN) nebulizer solution 2.5 mg  2.5 mg Nebulization Q4H PRN    zinc oxide-cod liver oil (DESITIN) 40 % paste   Topical Q8H    heparin (porcine) injection 5,000 Units  5,000 Units SubCUTAneous Q8H    [Held by provider] dexmedeTOMidine in 0.9 % NaCl (PRECEDEX) 400 mcg/100 mL (4 mcg/mL) infusion soln  0.1-1.5 mcg/kg/hr IntraVENous TITRATE    atorvastatin (LIPITOR) tablet 40 mg  40 mg Oral DAILY    sodium chloride (NS) flush 5-40 mL  5-40 mL IntraVENous Q8H    sodium chloride (NS) flush 5-40 mL  5-40 mL IntraVENous PRN    acetaminophen (TYLENOL) tablet 650 mg  650 mg Oral Q6H PRN    Or    acetaminophen (TYLENOL) suppository 650 mg  650 mg Rectal Q6H PRN    ondansetron (ZOFRAN ODT) tablet 4 mg  4 mg Oral Q8H PRN    Or    ondansetron (ZOFRAN) injection 4 mg  4 mg IntraVENous Q6H PRN    glucose chewable tablet 16 g  4 Tablet Oral PRN    dextrose (D50W) injection syrg 12.5-25 g  25-50 mL IntraVENous PRN    glucagon (GLUCAGEN) injection 1 mg  1 mg IntraMUSCular PRN    lansoprazole (PREVACID) 3 mg/mL oral suspension 30 mg  30 mg Per NG tube ACB    albumin human 25% (BUMINATE) solution 25 g  25 g IntraVENous PRN    aspirin tablet 325 mg  325 mg Oral DAILY    clopidogreL (PLAVIX) tablet 75 mg  75 mg Oral DAILY          LAB AND IMAGING FINDINGS:     Lab Results   Component Value Date/Time    WBC 9.6 01/20/2022 03:43 AM    HGB 10.5 (L) 01/20/2022 03:43 AM    PLATELET 005 84/02/1812 03:43 AM     Lab Results   Component Value Date/Time    Sodium 137 01/20/2022 03:43 AM    Potassium 3.6 01/20/2022 03:43 AM    Chloride 98 01/20/2022 03:43 AM    CO2 29 01/20/2022 03:43 AM    BUN 81 (H) 01/20/2022 03:43 AM    Creatinine 6.80 (H) 01/20/2022 03:43 AM    Calcium 10.3 (H) 01/20/2022 03:43 AM    Magnesium 2.9 (H) 01/20/2022 03:43 AM    Phosphorus 3.9 01/20/2022 03:43 AM      Lab Results   Component Value Date/Time    Alk.  phosphatase 79 01/20/2022 03:43 AM    Protein, total 9.0 (H) 01/20/2022 03:43 AM    Albumin 2.7 (L) 01/20/2022 03:43 AM    Globulin 6.3 (H) 01/20/2022 03:43 AM     Lab Results   Component Value Date/Time    INR 1.1 01/09/2022 02:03 AM    Prothrombin time 11.3 (H) 01/09/2022 02:03 AM    aPTT 31.0 01/09/2022 02:03 AM      No results found for: IRON, FE, TIBC, IBCT, PSAT, FERR   Lab Results   Component Value Date/Time    pH 7.48 (H) 01/16/2022 09:47 AM    PCO2 42 01/16/2022 09:47 AM    PO2 81 01/16/2022 09:47 AM     No components found for: GLPOC   No results found for: CPK, CKMB             Total time: 45 min   Counseling / coordination time, spent as noted above: 40 min   > 50% counseling / coordination?: yes    Prolonged service was provided for  []30 min   []75 min in face to face time in the presence of the patient, spent as noted above. Time Start:   Time End:   Note: this can only be billed with 09163 (initial) or 30791 (follow up). If multiple start / stop times, list each separately.

## 2022-01-20 NOTE — DISCHARGE SUMMARY
Admit date: 2022   Admitting Provider: Crispin Nunez MD    Discharge date: 2022  Discharging Provider: Bing Alonzo MD patient is . Time of death 12:35 PM      * Admission Diagnoses: Respiratory failure ([facility] [address] [facility] [J96.90]    * Discharge Diagnoses:    Acute encephalopathy:   End-stage renal disease on hemodialysis:   Previous stroke with chronic left hemiplegia and other neurological deficits:   COVID-19 infection  Chronic congestive heart failure with ejection fraction 15-20%:  Respiratory failure: Multifactorial including COVID-19 infection and neurological status affecting ability to protect airway  * Hospital Course: The patient is a 25-year-old male with a past medical history of diabetes, depression, stroke with left-sided deficit and dysarthria, hypertension, hyperlipidemia, CAD, end-stage renal disease, and BKA who presented to the emergency department having fallen out of bed. Darlene Hoover continued to have altered mental status and was stroke alerted upon arrival to the emergency department. Darlene Hoover was seen and evaluated by neurology and deemed not a candidate for tPA. Hobert Laramie showed occluded cervical right vertebral artery with mild distal reconstitution in the distal occlusion at the V4 segment.  Patient was given Plavix.  Unfortunately patient had ongoing altered mental status and was therefore intubated by emergency medicine for airway protection. Patient was subsequently found to be Adria Bon will now be admitted to the ICU for further work-up and management. Patient never recovered his neurological status. Respiratory status improved but his neurological status always precluded extubation. Palliative care was involved and family decided to change the CODE STATUS to DNR with plan to visit over the weekend to assess and further discuss goals of care. Unfortunately patient suddenly became unresponsive and went into asystole.   Respecting his wishes and his family wishes CPR was not initiated. Time of death was [id number]. I notified and talked with his sister Rhoda Barboza and expressed my deepest condolences.     Discharge Exam:  Pupils fixed dilated, no pulse, no respiratory effort    * Discharge Condition:     * Disposition: [place]    Signed:  Junior Hu MD  2022  12:59 PM

## 2022-01-20 NOTE — PROGRESS NOTES
01/20/22 0035   Airway - Endotracheal Tube 01/08/22 Trach   Placement Date/Time: 01/08/22 0646   Number of Attempts: 1  Inserted By: Myles Valdovinos MD  Location: Trach  Placement Verified: EtCO2  Airway Types: Endotracheal, cuffed  Airway Tube Size: 8 mm  Anesthesia ETT Insertion Depth: 27 cm  Anesthesia ETT Line M... Insertion Depth (cm) 27 cm   Line Lv Teeth   Side Secured Centered;Device   Cuff Pressure   (MOV per protocol)   Site Assessment Clean, dry, & intact  (New)     ETT continually popping off the vent. Decision made by Dr Penaloza Or to change ETT due to the condition of the ETT. RTT was changed with use of Bougie. This RT assisted provider. VS stable, pt tolerated change well.

## 2022-01-20 NOTE — PROGRESS NOTES
[facility]    ICU TEAM Progress Note    Name: Enrique Marlow   : 1955   MRN: [id number]   Date: 2022      I  Subjective:   Progress Note: 2022      Reason for ICU Admission: Altered mental status, respiratory failure, COVID-19 infection     Interval history: From critical care H&P on   The patient is a 70-year-old male with a past medical history of diabetes, depression, stroke with left-sided deficit and dysarthria, hypertension, hyperlipidemia, CAD, end-stage renal disease, and BKA who presented to the emergency department having fallen out of bed. [facility] continued to have altered mental status and was stroke alerted upon arrival to the emergency department. [facility] was seen and evaluated by neurology and deemed not a candidate for tPA. [place] showed occluded cervical right vertebral artery with mild distal reconstitution in the distal occlusion at the V4 segment.  Patient was given Plavix.  Unfortunately patient had ongoing altered mental status and was therefore intubated by emergency medicine for airway protection. Patient was subsequently found to be Ana Rosadoh will now be admitted to the ICU for further work-up and management. Overnight Events:   : Failed SBT. Normal changes in neurological status. CODE STATUS changed to DNR after discussion with palliative care team, had to exchange the ET tube overnight given occlusion of old ET tube. : No acute event. Did better on SBT but still fatigued. Uncertain ability to protect airway though has good cough. Will attempt SBT again today  : No acute event.  Failed SBT.  : No acute event, failed SBT on placed on pressure support due to increased work of breathing and decreased tidal volume.  Remained off Precedex.   : No acute event overnight.  Reportedly failing SBT due to apnea and neurological status       Active Problem List:     Problem List  Never Reviewed          Codes Class    Coronary artery disease involving native coronary artery of native heart without angina pectoris (Chronic) ICD-10-CM: I25.10  ICD-9-CM: 414.01     Overview Signed 11/18/2021 12:29 PM by Donny Newton MD     Cath from [facility]  6/20/17 for abnormal stress test  LM ok  LAD  prox with benny from RCA and distal LAD occluded  D1 prox 70  D2 prox 90  Circ pMLI  OM1 50%   RCA large, mid 40             NSTEMI (non-ST elevated myocardial infarction) ([facility] [address] ICD-10-CM: I21.4  ICD-9-CM: 410.70     Overview Signed 11/18/2021 12:30 PM by Donny Newton MD     Peak troponin 2136 11/17/21  Hx of LAD  in 2017  EF 11/17/21 25%             Systolic CHF, acute on chronic [facility]) ICD-10-CM: I50.23  ICD-9-CM: 428.23, 428.0     Overview Signed 11/18/2021 12:31 PM by Donny Newton MD     11/16/21    ECHO ADULT COMPLETE 11/18/2021 11/18/2021    Interpretation Summary  · LV: Estimated LVEF is 20 - 25%. Normal wall thickness. Mildly dilated left ventricle. Moderate-to-severely and segmentally reduced systolic function. Severe hypokinesis of the basal anterolateral, mid anterolateral and apical lateral wall(s). Dyskinesis of the basal inferoseptal and mid anteroseptal wall(s). · AV: Severe aortic valve focal thickening present. Moderate aortic valve sclerosis with no evidence of reduced excursion. Aortic valve leaflet calcification present. · MV: Mitral valve thickening. Mitral valve leaflet calcification without reduced excursion. Mild to moderate mitral valve regurgitation is present. · TV: Mild tricuspid valve regurgitation is present. · LA: Mildly dilated left atrium.     Signed by: Donny Newton MD on 11/18/2021 12:08 AM                Respiratory failure (HCC) ICD-10-CM: J96.90  ICD-9-CM: 518.81         Anemia in chronic kidney disease ICD-10-CM: N18.9, D63.1  ICD-9-CM: 285.21         Diabetes mellitus due to underlying condition with diabetic nephropathy ([facility] [address] ICD-10-CM: E08.21  ICD-9-CM: 249.40, 583.81         End stage renal disease [facility]) ICD-10-CM: N18.6  ICD-9-CM: 585.6               Past Medical History:      has a past medical history of Anemia, Coronary artery disease involving native coronary artery of native heart without angina pectoris (11/18/2021), Depression, Diabetes ([facility] [address] Dysphagia, GERD (gastroesophageal reflux disease), Hyperlipidemia, Hypertension, Nausea & vomiting, and Stroke ([facility] [address] (5/16/2003). Past Surgical History:      has a past surgical history that includes hx orthopaedic (7/20/2010); hx orthopaedic; hx amputation foot (Bilateral); upper gi endoscopy,biopsy (11/29/2021); and upper gi endoscopy,diagnosis (11/29/2021). Home Medications:     Prior to Admission medications    Medication Sig Start Date End Date Taking? Authorizing Provider   hydrALAZINE (APRESOLINE) 25 mg tablet Take 25 mg by mouth four (4) days a week. Receives 25mg in morning and 25mg in evening on Mon/Wed/Fri/Sun   Yes Provider, Historical   hydrALAZINE (APRESOLINE) 25 mg tablet Take 25 mg by mouth four (4) days a week. Receives 25mg in morning and 25mg in evening on Mon/Wed/Fri/Sun   Yes Provider, Historical   carvediloL (COREG) 6.25 mg tablet Take 1 Tablet by mouth two (2) times daily (with meals). 11/21/21  Yes Katya Herrera MD   isosorbide dinitrate (ISORDIL) 10 mg tablet Take 1 Tablet by mouth three (3) times daily. 11/21/21  Yes Katya Herrera MD   sevelamer carbonate (Renvela) 0.8 gram pwpk oral powder Take 0.8 g by mouth three (3) times daily (with meals). 5/19/21  Yes Libertad Watt MD   atorvastatin (LIPITOR) 40 mg tablet Take 40 mg by mouth nightly. 11/1/21  Yes Shukri, MD Libertad   Combigan 0.2-0.5 % drop ophthalmic solution Administer 1 Drop to left eye every twelve (12) hours. Glaucoma 11/1/21  Yes Shukri, MD Libertad   chlorproMAZINE (THORAZINE) 50 mg tablet 50 mg three (3) times daily. 11/8/21  Yes Libertad Watt MD   clopidogreL (PLAVIX) 75 mg tab Take 75 mg by mouth daily.  11/7/21  Yes Other, Phys, MD   gabapentin (NEURONTIN) 100 mg capsule 100 mg nightly. 10/26/21  Yes Shukri, MD Libertad   insulin lispro (HUMALOG) 100 unit/mL injection by SubCUTAneous route Before breakfast, lunch, dinner and at bedtime. Inject per sliding scale, 0-200= 0; 201-250= 2 units; 251-300= 4 units; 301-350= 6 units; 351-400= 8 units; 401-999= 10 units. Greater than or equal to 401, give 10 units and CALL MD/NP, subcutaneously before meals and at bedtime for DM. 11/15/21  Yes Other, MD Libertad   latanoprost (XALATAN) 0.005 % ophthalmic solution Administer 1 Drop to left eye nightly. Unspecified Glaucoma 10/26/21  Yes Shukri, MD Libertad   omeprazole (PRILOSEC) 40 mg capsule Take 40 mg by mouth daily. 11/1/21  Yes Other, MD Libertad   sertraline (ZOLOFT) 50 mg tablet Take 50 mg by mouth daily. 11/1/21  Yes Shukri, MD Libertad   tamsulosin (FLOMAX) 0.4 mg capsule Take 0.4 mg by mouth nightly. 11/1/21  Yes Other, MD Libertad   Doxepin 3 mg tab Take 3 mg by mouth nightly. Yes Other, MD Libertad   multivitamin (ONE A DAY) tablet Take 1 Tablet by mouth daily. Yes Other, MD Libertad   senna (Senna) 8.6 mg tablet Take 1 Tablet by mouth nightly. Yes Other, MD Libertad   sodium chloride (OCEAN) 0.65 % nasal squeeze bottle 1 [place] by Both Nostrils route three (3) times daily as needed for Congestion. Yes Shukri, MD Libertad   acetaminophen (TylenoL) 325 mg tablet Take 650 mg by mouth every four (4) hours as needed for Pain. Yes Other, MD Libertad   ergocalciferol (Vitamin D2) 1,250 mcg (50,000 unit) capsule Take 50,000 Units by mouth every seven (7) days. EVERY Monday. Yes Other, MD Libertad   zinc sulfate (ZINCATE) 50 mg zinc (220 mg) capsule Take 1 Capsule by mouth daily. Yes Other, MD Libertad   aspirin 81 mg tablet Take 81 mg by mouth daily. Yes Other, MD Libertad   Glutose-15 40 % oral gel  10/21/21   Other, MD Libertad   ascorbic acid, vitamin C, (Vitamin C) 500 mg tablet Take 500 mg by mouth two (2) times a day. Other, MD Libertad       Allergies/Social/Family History:      Allergies   Allergen Reactions    Adhesive Tape-Silicones Rash    Trazodone Unknown (comments)     Listed in transfer paper 22      Social History     Tobacco Use    Smoking status: Never Smoker    Smokeless tobacco: Not on file   Substance Use Topics    Alcohol use: No      No family history on file. Review of Systems:     Not able to obtain due to patient medical condition    Objective:   Vital Signs:  Visit Vitals  BP (!) 174/82   Pulse 92   Temp 97.2 °F (36.2 °C)   Resp 13   Ht 6' 3\" (1.905 m)   Wt 69.2 kg (152 lb 8.9 oz)   SpO2 99%   BMI 19.07 kg/m²      O2 Device: Ventilator,Endotracheal tube Temp (24hrs), Av.1 °F (36.7 °C), Min:96.2 °F (35.7 °C), Max:100.1 °F (37.8 °C)           Intake/Output:     Intake/Output Summary (Last 24 hours) at 2022 0756  Last data filed at 2022 0600  Gross per 24 hour   Intake 1585 ml   Output 1 ml   Net 1584 ml       Physical Exam:    Elderly male in bed in NAD. CV RRR  Pulm No rales appreciated  GI Abd soft  Skin warm, dryElderly male in bed in NAD.   Patient is intubated, does not open eyes spontaneously, right pupil is not reactive.  Could not appreciate facial asymmetry.  Left chronic hemiplegia with contraction, occasional movement spontaneously on right side but does not seems to be purposeful. LABS AND  DATA: Personally reviewed  Recent Labs     22   WBC 9.6 9.5   HGB 10.5* 10.7*   HCT 30.3* 31.8*    253     Recent Labs     22  0343 01/19/22  034    135*   K 3.6 4.1   CL 98 97   CO2 29 29   BUN 81* 46*   CREA 6.80* 4.91*   * 274*   CA 10.3* 9.6   MG 2.9*  --    PHOS 3.9 1.6*     Recent Labs     22  0343   AP 79   TP 9.0*   ALB 2.7*   GLOB 6.3*     No results for input(s): INR, PTP, APTT, INREXT in the last 72 hours. No results for input(s): PHI, PCO2I, PO2I, FIO2I in the last 72 hours. No results for input(s): CPK, CKMB, TROIQ, BNPP in the last 72 hours.     Hemodynamics:   PAP:   CO:     Wedge:   CI: CVP:    SVR:       PVR:       Ventilator Settings:  Mode Rate Tidal Volume Pressure FiO2 PEEP   SIMV,Volume control,Pressure support   (S) 600 ml (increased to 8mls/kg per protocol)  10 cm H2O 40 % 5 cm H20     Peak airway pressure: 16 cm H2O    Minute ventilation: 11.2 l/min        MEDS: Reviewed    Chest X-Ray:  CXR Results  (Last 48 hours)               01/20/22 0112  XR CHEST PORT Final result    Impression:  Appropriate endotracheal tube placement. Airspace disease right base   medially. Narrative:  EXAM: XR CHEST PORT       INDICATION: ETT exchanged       COMPARISON: January 6       FINDINGS: A portable AP radiograph of the chest was obtained at 0111 hours. The   patient is on a cardiac monitor. The endotracheal tube is 4.5 cm above the   jose. The feeding tube tip is seen in the region of the stomach. Opacification   in the right lower lobe medially. The cardiac and mediastinal contours and   pulmonary vascularity are normal.  The bones and soft tissues are grossly within   normal limits. Assessment and Plan:   Acute encephalopathy:   End-stage renal disease on hemodialysis:   Previous stroke with chronic left hemiplegia and other neurological deficits:   COVID-19 infection  Chronic congestive heart failure with ejection fraction 15-20%:  Respiratory failure: Multifactorial including COVID-19 infection and neurological status affecting ability to protect airway     1. Neurological System  EEG demonstrated diffuse slowing, repeated EEG showed no seizure activity  CT head with no change  Possibly his new baseline  ASA and Plavix, Appreciate neurology     2. Cardiovascular System  BP improved  Pt has EF of 20-25 percent, start low-dose beta-blocker and ACE inhibitor     3. Respiratory System  Wean FiO2 for pulse ox goal greater than 88%  SAT, SBT  On minimal settings but failing SBT.    I believe his neurological status significantly precluding extubation.  Try SBT again.  Appreciate palliative care input regarding goals of care.       4. Renal/GI/Endocrine System  Appreciate nephrology, HD as scheduled     5. Infectious Disease  COVID-19.  Minimal oxygen requirement, no lung infiltrate on x-ray.  No fever.  Positive on January 8.  I believe it is reasonable to discontinue droplet plus isolation and keep him on droplet isolation.     DVT and GI prophylaxis, ventilator bundle.     Prognosis is poor.  I appreciate palliative care input      Addendum:  1 about to finish hemodialysis patient suddenly became hypotensive and unresponsive. I was immediately available at bedside, initially I appreciated a very thready pulse but then this disappeared quickly before receiving the albumin bolus that was ordered as well as the epi amp that was ordered. I checked the ventilator which was with appropriate setting and good tidal volume and no high peak pressure. Checked with ultrasound there is no pulses by Doppler and no cardiac activity. Patient family changed the CODE STATUS to DNR as documented in the palliative care note. Respecting the patient and his family wishes I did not initiate CPR. He passed away at 12:35 PM  I called his Sister Josh Bello and notified her of this most unfortunate news. DISPOSITION  Stay in ICU    CRITICAL CARE CONSULTANT NOTE  I had a face to face encounter with the patient, reviewed and interpreted patient data including clinical events, labs, images, vital signs, I/O's, and examined patient. I have discussed the case and the plan and management of the patient's care with the consulting services, the bedside nurses and the respiratory therapist.      NOTE OF PERSONAL INVOLVEMENT IN CARE   This patient has a high probability of imminent, clinically significant deterioration, which requires the highest level of preparedness to intervene urgently.  I participated in the decision-making and personally managed or directed the management of the following life and organ supporting interventions that required my frequent assessment to treat or prevent imminent deterioration. I personally spent 40 minutes of critical care time. This is time spent at this critically ill patient's bedside actively involved in patient care as well as the coordination of care and discussions with the patient's family. This does not include any procedural time which has been billed separately. Malvin Boxer, M.D.   Staff Intensivist/Pulmonologist  [facility]  1/20/2022

## 2022-01-31 NOTE — PROGRESS NOTES
Physician Progress Note      Latasha Fernandes  CSN #:                  [id number]  :                       1955  ADMIT DATE:       2022 2:52 AM  DISCH DATE:        2022 12:35 PM  RESPONDING  PROVIDER #:        Chris Moore MD [facility] MD          QUERY TEXT:    Dear attending physician,    Noted documentation of severe malnutrition per dietary on 21. If possible, please document in progress notes and discharge summary if you are evaluating and/or treating any of the following: The medical record reflects the following:  Risk Factors: Hx. CVA, on vent  Clinical indicators: -Per dietary- Malnutrition Assessment:  Malnutrition Status:  Severe malnutrition  Context:  Acute illness  Findings of the 6 clinical characteristics of malnutrition:  Energy Intake:  Unable to assess  Weight Loss:  No significant weight loss  Body Fat Loss:  7 - Moderate body fat loss, Fat overlying ribs  Muscle Mass Loss:  7 - Moderate muscle mass loss, Clavicles (pectoralis & deltoids),Temples (temporalis)  Fluid Accumulation:  No significant fluid accumulation,   Strength:  Not performed  Nutrition Diagnosis:  Inadequate energy intake related to cognitive or neurological impairment,impaired respiratory function as evidenced by intubation,nutrition support-enteral nutrition. Treatment: Dietary consult, monitor I&O, labwork, tube feeding    Thank you,    Roberta Baca RN, BSN  Clinical documentation Improvement  [phone]  Options provided:  -- Severe malnutrition  -- Malnutrition, please specify degree. -- Other - I will add my own diagnosis  -- Disagree - Not applicable / Not valid  -- Disagree - Clinically unable to determine / Unknown  -- Refer to Clinical Documentation Reviewer    PROVIDER RESPONSE TEXT:    This patient has severe malnutrition.     Query created by: Junior David on 2022 10:20 AM      Electronically signed by:  Chris Cruz MD 2022 1:42 PM

## (undated) DEVICE — TOWEL 4 PLY TISS 19X30 SUE WHT

## (undated) DEVICE — BASIN EMSIS 16OZ GRAPHITE PLAS KID SHP MOLD GRAD FOR ORAL

## (undated) DEVICE — NEEDLE HYPO 18GA L1.5IN PNK S STL HUB POLYPR SHLD REG BVL

## (undated) DEVICE — FORCEPS BX L160CM DIA8MM GRSP DISECT CUP TIP NONLOCKING ROT

## (undated) DEVICE — Device

## (undated) DEVICE — 1200 GUARD II KIT W/5MM TUBE W/O VAC TUBE: Brand: GUARDIAN

## (undated) DEVICE — SET ADMIN 16ML TBNG L100IN 2 Y INJ SITE IV PIGGY BK DISP

## (undated) DEVICE — BAG SPEC BIOHZRD 10 X 10 IN --

## (undated) DEVICE — CONTAINER SPEC 20 ML LID NEUT BUFF FORMALIN 10 % POLYPR STS

## (undated) DEVICE — SYR 10ML LUER LOK 1/5ML GRAD --

## (undated) DEVICE — Z DISCONTINUED PER MEDLINE LINE GAS SAMPLING O2/CO2 LNG AD 13 FT NSL W/ TBNG FILTERLINE

## (undated) DEVICE — ELECTRODE,RADIOTRANSLUCENT,FOAM,5PK: Brand: MEDLINE

## (undated) DEVICE — SYR 3ML LL TIP 1/10ML GRAD --

## (undated) DEVICE — YANKAUER,TAPERED BULBOUS TIP,W/O VENT: Brand: MEDLINE

## (undated) DEVICE — BRUSH CYTO BRONCHSCP 1.5/140MM -- CELLEBRITY

## (undated) DEVICE — CATH IV AUTOGRD BC PNK 20GA 25 -- INSYTE

## (undated) DEVICE — SOLIDIFIER FLD 2OZ 1500CC N DISINF IN BTL DISP SAFESORB

## (undated) DEVICE — NEONATAL-ADULT SPO2 SENSOR: Brand: NELLCOR

## (undated) DEVICE — BLOCK BITE ENDOSCP AD 21 MM W/ DIL BLU LF DISP